# Patient Record
Sex: FEMALE | Race: WHITE | NOT HISPANIC OR LATINO | Employment: OTHER | URBAN - METROPOLITAN AREA
[De-identification: names, ages, dates, MRNs, and addresses within clinical notes are randomized per-mention and may not be internally consistent; named-entity substitution may affect disease eponyms.]

---

## 2017-06-05 ENCOUNTER — TELEPHONE (OUTPATIENT)
Dept: MEDICAL GROUP | Facility: PHYSICIAN GROUP | Age: 77
End: 2017-06-05

## 2017-06-05 DIAGNOSIS — I10 ESSENTIAL HYPERTENSION: ICD-10-CM

## 2017-06-05 RX ORDER — BENAZEPRIL HYDROCHLORIDE 20 MG/1
20 TABLET ORAL 2 TIMES DAILY
Qty: 180 TAB | Refills: 0 | Status: SHIPPED | OUTPATIENT
Start: 2017-06-05 | End: 2017-09-05 | Stop reason: SDUPTHER

## 2017-07-06 DIAGNOSIS — I10 ESSENTIAL HYPERTENSION: ICD-10-CM

## 2017-07-08 RX ORDER — AMLODIPINE BESYLATE 5 MG/1
TABLET ORAL
Qty: 30 TAB | Refills: 0 | Status: SHIPPED | OUTPATIENT
Start: 2017-07-08 | End: 2017-08-24 | Stop reason: SDUPTHER

## 2017-08-24 ENCOUNTER — TELEPHONE (OUTPATIENT)
Dept: HEMATOLOGY ONCOLOGY | Facility: MEDICAL CENTER | Age: 77
End: 2017-08-24

## 2017-08-24 ENCOUNTER — OFFICE VISIT (OUTPATIENT)
Dept: MEDICAL GROUP | Facility: PHYSICIAN GROUP | Age: 77
End: 2017-08-24
Payer: MEDICARE

## 2017-08-24 VITALS
OXYGEN SATURATION: 93 % | RESPIRATION RATE: 16 BRPM | HEIGHT: 64 IN | TEMPERATURE: 97.7 F | SYSTOLIC BLOOD PRESSURE: 120 MMHG | HEART RATE: 77 BPM | BODY MASS INDEX: 21 KG/M2 | DIASTOLIC BLOOD PRESSURE: 64 MMHG | WEIGHT: 123 LBS

## 2017-08-24 DIAGNOSIS — Z72.0 TOBACCO ABUSE: ICD-10-CM

## 2017-08-24 DIAGNOSIS — E78.2 MIXED HYPERLIPIDEMIA: ICD-10-CM

## 2017-08-24 DIAGNOSIS — M40.04 POSTURAL KYPHOSIS OF THORACIC REGION: ICD-10-CM

## 2017-08-24 DIAGNOSIS — H40.9 GLAUCOMA OF BOTH EYES, UNSPECIFIED GLAUCOMA: ICD-10-CM

## 2017-08-24 DIAGNOSIS — F51.04 PSYCHOPHYSIOLOGICAL INSOMNIA: ICD-10-CM

## 2017-08-24 DIAGNOSIS — R26.89 BALANCE DISORDER: ICD-10-CM

## 2017-08-24 DIAGNOSIS — I10 HTN (HYPERTENSION), BENIGN: ICD-10-CM

## 2017-08-24 DIAGNOSIS — M85.89 OSTEOPENIA OF MULTIPLE SITES: ICD-10-CM

## 2017-08-24 PROCEDURE — 99214 OFFICE O/P EST MOD 30 MIN: CPT | Performed by: FAMILY MEDICINE

## 2017-08-24 RX ORDER — LATANOPROST 50 UG/ML
1 SOLUTION/ DROPS OPHTHALMIC DAILY
Qty: 1 BOTTLE | Refills: 4 | Status: SHIPPED | OUTPATIENT
Start: 2017-08-24 | End: 2018-03-05 | Stop reason: SDUPTHER

## 2017-08-24 RX ORDER — AMLODIPINE BESYLATE 5 MG/1
TABLET ORAL
Qty: 90 TAB | Refills: 3 | Status: ON HOLD | OUTPATIENT
Start: 2017-08-24 | End: 2018-05-07

## 2017-08-24 ASSESSMENT — PATIENT HEALTH QUESTIONNAIRE - PHQ9: CLINICAL INTERPRETATION OF PHQ2 SCORE: 0

## 2017-08-24 NOTE — PROGRESS NOTES
Subjective:     Chief Complaint   Patient presents with   • Establish Care   • Hypertension       Hilda Blake is a 77 y.o. female here today today to est care with new provider. Patient is a former patient of Dr. Grossman who is since retired.      Pt reports she feels that she stands leaning forward and feels like she is going to fall forward.  Pt denies Falls, injury, LOC, feeling like the room is spinning, feeling like she is going to pass out, visual changes, or weakness.       Pt reports insomnia. She states she has difficulty falling asleep due to racing thoughts associated with death of son.        Allergies   Allergen Reactions   • Tamoxifen Anaphylaxis     bleeding     Current medicines (including changes today)  Current Outpatient Prescriptions   Medication Sig Dispense Refill   • latanoprost (XALATAN) 0.005 % Solution Place 1 Drop in both eyes every day. 1 Bottle 4   • amlodipine (NORVASC) 5 MG Tab TAKE 1 TABLET BY MOUTH DAILY 90 Tab 3   • metoprolol (LOPRESSOR) 25 MG Tab Take 1 Tab by mouth 2 times a day. 180 Tab 0   • benazepril (LOTENSIN) 20 MG Tab Take 1 Tab by mouth 2 Times a Day. 180 Tab 0   • vitamin D (CHOLECALCIFEROL) 1000 UNIT TABS Take 1,000 Units by mouth every day.     • folic acid (FOLVITE) 1 MG TABS Take 1 mg by mouth every day.         No current facility-administered medications for this visit.      Social History   Substance Use Topics   • Smoking status: Current Every Day Smoker     Packs/day: 0.50     Years: 61.00     Types: Cigarettes     Start date: 1956   • Smokeless tobacco: Never Used      Comment: cessation recommended   • Alcohol use 0.5 oz/week     1 Shots of liquor per week      Comment: 1-2 /week     Family Status   Relation Status Death Age   • Mother     • Father       Family History   Problem Relation Age of Onset   • Diabetes Neg Hx    • Hypertension Neg Hx    • Heart Disease Neg Hx    • Autoimmune Disease Son      Sarcoidosis   • Cancer Son 48      "colon    • No Known Problems Son    • No Known Problems Son    • No Known Problems Daughter      She    has a past medical history of Breast cancer (CMS-HCC) (2004); Glaucoma - Dr. Lima (4/20/2010); Hemangioma of liver benign (8/19/2013); HTN (hypertension), benign; Ptosis of eyelid; and Tobacco abuse.        ROS   GEN: no weight loss, fevers, or chills  HEENT: no blurry vision or change in vision, no ear pain, no difficulty swallowing, no throat pain, no runny nose, no nasal congestion  Resp: no shortness of breath, no cough  CV: no racing heart, no irregular beats, no chest pain  Abd: no nausea, no vomiting, no diarrhea, no constipation, no blood in stool, no dark stools, no incontinence  : no dysuria, no hematuria, no urinary incontinence, no increased frequency  MSK: no muscle aches, no joint pain, no limited motion  Neuro: no headaches, no dizziness, no LOC, no weakness, no numbness/tingling       Objective:     Blood pressure 120/64, pulse 77, temperature 36.5 °C (97.7 °F), resp. rate 16, height 1.626 m (5' 4\"), weight 55.8 kg (123 lb), SpO2 93 %. Body mass index is 21.11 kg/m².   Physical Exam:  Constitutional: Alert, no distress.  Skin: Warm, dry, good turgor, no rashes in visible areas.  Eye: Equal, round and reactive, conjunctiva clear, lids normal.  ENMT: Lips without lesions, oropharynx non-erythematous, no exudate, moist oral mucosa, bilateral tympanic membranes: No bulging, no retraction, no fluid, nonerythematous, positive light reflex, external auditory canals: Clear, scant cerumen, nonerythematous  Neck: Trachea midline, no masses, no thyromegaly. No cervical or supraclavicular lymphadenopathy. Full ROM  Respiratory: Unlabored respiratory effort, good air movement, lungs clear to auscultation, no wheezes, no ronchi.  Cardiovascular:RRR, +S1, S2, no murmur, no peripheral edema, pedal and radial pulses equal and intact bilat  Abdomen: Soft, non-tender, no masses, no hepatosplenomegaly.  MSK:5/5 " muscle strength in upper extremities as well as lower extremity bilaterally, Full lumbar range of motion, ambulates unassisted, standing posture is leaning forward with kyphosis noted in thoracic spine, no widened gait  Psych: Alert and oriented, appropriate affect and mood.  Neuro: CN2-12 intact, no gross motor or sensory deficits negative Romberg, no rigidity,      Assessment and Plan:   The following treatment plan was discussed    1. Postural kyphosis of thoracic region  2. Balance disorder  Recommend home physical therapy. Patient has difficulty getting to appointments. Patient's and her gravity is leaning forward due to kyphosis. Physical therapy will be able to correct this postural change.- CBC WITH DIFFERENTIAL; Future    3. HTN (hypertension), benign  Chronic: Well-controlled  - COMP METABOLIC PANEL; Future  - amlodipine (NORVASC) 5 MG Tab; TAKE 1 TABLET BY MOUTH DAILY  Dispense: 90 Tab; Refill: 3    4. Mixed hyperlipidemia  Chronic: Will recheck lipid panel  - LIPID PROFILE; Future    5. Glaucoma of both eyes, unspecified glaucoma  Continue to follow with ophthalmology  - latanoprost (XALATAN) 0.005 % Solution; Place 1 Drop in both eyes every day.  Dispense: 1 Bottle; Refill: 4    6. Osteopenia of multiple sites  Continue vitamin D supplementation. Advised calcium intake through diet.    7. Tobacco abuse  Pt understands continuing to smoke will increase morbidity and mortality from cancer, stroke, and vascular disease.     - REFERRAL TO LUNG CANCER SCREENING PROGRAM    8. Psychophysiological insomnia  Recommend meditation and melatonin. Patient declines counseling      Followup: Return in about 6 months (around 2/24/2018) for Annual.    Please note that this dictation was created using voice recognition software. I have made every reasonable attempt to correct obvious errors, but I expect that there are errors of grammar and possibly content that I did not discover before finalizing the note.

## 2017-08-24 NOTE — MR AVS SNAPSHOT
"        Hilda Marinoley   2017 10:40 AM   Office Visit   MRN: [id number]    Department:  [facility]   Dept Phone:  [phone]    Description:  Female : 1940   Provider:  Pallavi Mcfarlane D.O.           Reason for Visit     Establish Care     Hypertension           Allergies as of 2017     Allergen Noted Reactions    Tamoxifen 2009   Anaphylaxis    bleeding      You were diagnosed with     Balance disorder   [641400]       HTN (hypertension), benign   [095358]       Mixed hyperlipidemia   [272.2.ICD-9-CM]       Glaucoma of both eyes, unspecified glaucoma   [1503421]       Osteopenia of multiple sites   [3455567]       Tobacco abuse   [595596]       Essential hypertension   [8428758]         Vital Signs     Blood Pressure Pulse Temperature Respirations Height Weight    120/64 mmHg 77 36.5 °C (97.7 °F) 16 1.626 m (5' 4\") 55.792 kg (123 lb)    Body Mass Index Oxygen Saturation Smoking Status             21.10 kg/m2 93% Current Every Day Smoker         Basic Information     Date Of Birth Sex Race Ethnicity Preferred Language    1940 Female White Unknown English      Problem List              ICD-10-CM Priority Class Noted - Resolved    HTN (hypertension), benign I10   Unknown - Present    Ptosis of Eyelid Dr. Lima    Unknown - Present    Tobacco abuse Z72.0   Unknown - Present    Hyperlipidemia E78.5   2010 - Present    Glaucoma - Dr. Lima H40.9   2010 - Present    Osteopenia M85.80   2013 - Present    History of breast cancer  Z85.3   2013 - Present    Hemangioma of liver benign D18.03   2013 - Present    Vitamin D deficiency E55.9   3/9/2015 - Present    Cataracts, both eyes H26.9   2016 - Present      Health Maintenance        Date Due Completion Dates    IMM ZOSTER VACCINE 2000 ---    IMM PNEUMOCOCCAL 65+ (ADULT) LOW/MEDIUM RISK SERIES (2 of 2 - PCV13) 3/9/2016 3/9/2015    IMM INFLUENZA (1) 2017, 2014, 2014, " 10/15/2012, 10/17/2011, 10/19/2010    BONE DENSITY 11/1/2017 11/1/2012, 4/20/2009, 4/20/2009    IMM DTaP/Tdap/Td Vaccine (2 - Td) 3/9/2025 3/9/2015            Current Immunizations     Influenza TIV (IM) 1/7/2014, 10/15/2012, 10/17/2011    Influenza Vaccine Adult HD 11/5/2015  2:00 PM    Influenza Vaccine Pediatric 10/19/2010    Influenza Vaccine Quad Inj (Pf) 11/12/2014    Pneumococcal polysaccharide vaccine (PPSV-23) 3/9/2015    Tdap Vaccine 3/9/2015      Below and/or attached are the medications your provider expects you to take. Review all of your home medications and newly ordered medications with your provider and/or pharmacist. Follow medication instructions as directed by your provider and/or pharmacist. Please keep your medication list with you and share with your provider. Update the information when medications are discontinued, doses are changed, or new medications (including over-the-counter products) are added; and carry medication information at all times in the event of emergency situations     Allergies:  TAMOXIFEN - Anaphylaxis               Medications  Valid as of: August 24, 2017 - 11:58 AM    Generic Name Brand Name Tablet Size Instructions for use    AmLODIPine Besylate (Tab) NORVASC 5 MG TAKE 1 TABLET BY MOUTH DAILY        Benazepril HCl (Tab) LOTENSIN 20 MG Take 1 Tab by mouth 2 Times a Day.        Cholecalciferol (Tab) cholecalciferol 1000 UNIT Take 1,000 Units by mouth every day.        Folic Acid (Tab) FOLVITE 1 MG Take 1 mg by mouth every day.          Latanoprost (Solution) XALATAN 0.005 % Place 1 Drop in both eyes every day.        Metoprolol Tartrate (Tab) LOPRESSOR 25 MG Take 1 Tab by mouth 2 times a day.        .                 Medicines prescribed today were sent to:     [id number] [place] - [address]    [address] [place] [address]    Phone: [phone] Fax: [phone]    Open 24 Hours?: No      Medication refill instructions:       If your  prescription bottle indicates you have medication refills left, it is not necessary to call your provider’s office. Please contact your pharmacy and they will refill your medication.    If your prescription bottle indicates you do not have any refills left, you may request refills at any time through one of the following ways: The online Re[employer] system (except Urgent Care), by calling your provider’s office, or by asking your pharmacy to contact your provider’s office with a refill request. Medication refills are processed only during regular business hours and may not be available until the next business day. Your provider may request additional information or to have a follow-up visit with you prior to refilling your medication.   *Please Note: Medication refills are assigned a new Rx number when refilled electronically. Your pharmacy may indicate that no refills were authorized even though a new prescription for the same medication is available at the pharmacy. Please request the medicine by name with the pharmacy before contacting your provider for a refill.        Your To Do List     Future Labs/Procedures Complete By Expires    CBC WITH DIFFERENTIAL  As directed 8/24/2018    COMP METABOLIC PANEL  As directed 8/24/2018    LIPID PROFILE  As directed 8/24/2018      Other Notes About Your Plan     No problems identified through [facility]             MagicEventhart Status: Patient Declined        Quit Tobacco Information     Do you want to quit using tobacco?    Quitting tobacco decreases risks of cancer, heart and lung disease, increases life expectancy, improves sense of taste and smell, and increases spending money, among other benefits.    If you are thinking about quitting, we can help.  • [facility] Quit Tobacco Program: 878-439-9020  o Program occurs weekly for four weeks and includes pharmacist consultation on products to support quitting smoking or chewing tobacco. A provider referral is needed for pharmacist  consultation.  • Tobacco Users Help Hotline: 6-800-QUIT-NOW (653-3924) or https://[place].quitlogix.org/  o Free, confidential telephone and online coaching for [place] residents. Sessions are designed on a schedule that is convenient for you. Eligible clients receive free nicotine replacement therapy.  • Nationally: www.smokefree.gov  o Information and professional assistance to support both immediate and long-term needs as you become, and remain, a non-smoker. Smokefree.gov allows you to choose the help that best fits your needs.

## 2017-08-24 NOTE — TELEPHONE ENCOUNTER
Received referral to lung cancer screening program.  Chart review to assess for lung cancer screening program eligibility.   1. Age 55-77 yrs of age? Yes 77 y.o.  2. 30 pack year hx of smoking, or greater? Yes 1 ppdx 60 yrs= 60 pkyr hx   3. Current smoker or if quit, has pt quit within last 15 yrs?Yes   Has cut back to 1/2 pack a day  4. Any signs or symptoms of lung cancer? None note     5. Previous history of lung cancer? None noted  6. Chest CT within past 12 mos.? None noted  Patient does meet eligibility criteria. [facility] scheduling notified to schedule the shared decision making visit.

## 2017-08-28 ENCOUNTER — HOME HEALTH ADMISSION (OUTPATIENT)
Dept: HOME HEALTH SERVICES | Facility: HOME HEALTHCARE | Age: 77
End: 2017-08-28
Payer: MEDICARE

## 2017-08-29 ENCOUNTER — HOME CARE VISIT (OUTPATIENT)
Dept: HOME HEALTH SERVICES | Facility: HOME HEALTHCARE | Age: 77
End: 2017-08-29

## 2017-08-31 ENCOUNTER — HOME CARE VISIT (OUTPATIENT)
Dept: HOME HEALTH SERVICES | Facility: HOME HEALTHCARE | Age: 77
End: 2017-08-31
Payer: MEDICARE

## 2017-08-31 VITALS
DIASTOLIC BLOOD PRESSURE: 60 MMHG | HEIGHT: 64 IN | OXYGEN SATURATION: 98 % | HEART RATE: 61 BPM | SYSTOLIC BLOOD PRESSURE: 135 MMHG | TEMPERATURE: 97.9 F | RESPIRATION RATE: 18 BRPM | BODY MASS INDEX: 21 KG/M2 | WEIGHT: 123 LBS

## 2017-08-31 PROCEDURE — 665001 SOC-HOME HEALTH

## 2017-08-31 PROCEDURE — G0162 HHC RN E&M PLAN SVS, 15 MIN: HCPCS

## 2017-08-31 SDOH — ECONOMIC STABILITY: HOUSING INSECURITY: UNSAFE COOKING RANGE AREA: 0

## 2017-08-31 SDOH — ECONOMIC STABILITY: HOUSING INSECURITY: UNSAFE APPLIANCES: 0

## 2017-08-31 ASSESSMENT — ACTIVITIES OF DAILY LIVING (ADL)
HOME_HEALTH_OASIS: 01
TRANSPORTATION COMMENTS: FATIGUES EASILY
OASIS_M1830: 02
HOME_HEALTH_OASIS: 02

## 2017-08-31 ASSESSMENT — PATIENT HEALTH QUESTIONNAIRE - PHQ9
1. LITTLE INTEREST OR PLEASURE IN DOING THINGS: 01
2. FEELING DOWN, DEPRESSED, IRRITABLE, OR HOPELESS: 01

## 2017-08-31 ASSESSMENT — ENCOUNTER SYMPTOMS
NAUSEA: DENIES
VOMITING: DENIES
ADDITIONAL INFORMATION: 0-5

## 2017-09-01 ENCOUNTER — HOME CARE VISIT (OUTPATIENT)
Dept: HOME HEALTH SERVICES | Facility: HOME HEALTHCARE | Age: 77
End: 2017-09-01
Payer: MEDICARE

## 2017-09-04 ENCOUNTER — HOME CARE VISIT (OUTPATIENT)
Dept: HOME HEALTH SERVICES | Facility: HOME HEALTHCARE | Age: 77
End: 2017-09-04
Payer: MEDICARE

## 2017-09-04 VITALS
HEART RATE: 64 BPM | OXYGEN SATURATION: 92 % | RESPIRATION RATE: 16 BRPM | SYSTOLIC BLOOD PRESSURE: 118 MMHG | DIASTOLIC BLOOD PRESSURE: 50 MMHG | TEMPERATURE: 98.1 F

## 2017-09-04 PROCEDURE — G0151 HHCP-SERV OF PT,EA 15 MIN: HCPCS

## 2017-09-05 DIAGNOSIS — I10 ESSENTIAL HYPERTENSION: ICD-10-CM

## 2017-09-05 RX ORDER — BENAZEPRIL HYDROCHLORIDE 20 MG/1
20 TABLET ORAL 2 TIMES DAILY
Qty: 180 TAB | Refills: 1 | Status: SHIPPED | OUTPATIENT
Start: 2017-09-05 | End: 2018-03-05 | Stop reason: SDUPTHER

## 2017-09-05 ASSESSMENT — ACTIVITIES OF DAILY LIVING (ADL)
DRESSING_UB_ASSISTANCE: 0
EATING_ASSISTANCE: 0
ORAL_CARE_ASSISTANCE: 0
TOILETING_ASSISTANCE: 0
BATHING_ASSISTANCE: 0
DRESSING_LB_ASSISTANCE: 0
GROOMING_ASSISTANCE: 0

## 2017-09-05 NOTE — TELEPHONE ENCOUNTER
Refill X 6 months, sent to pharmacy.Pt. Seen in the last 6 months per protocol.   Lab Results   Component Value Date/Time    SODIUM 140 05/16/2016 09:40 AM    POTASSIUM 3.9 05/16/2016 09:40 AM    CHLORIDE 106 05/16/2016 09:40 AM    CO2 27 05/16/2016 09:40 AM    GLUCOSE 102 (H) 05/16/2016 09:40 AM    BUN 20 05/16/2016 09:40 AM    CREATININE 0.84 05/16/2016 09:40 AM    CREATININE 0.97 04/14/2011 09:08 AM    BUNCREATRAT 18 04/14/2011 09:08 AM    GLOMRATE >59 11/19/2009 07:41 AM       Judson Barboza, BryanD

## 2017-09-05 NOTE — TELEPHONE ENCOUNTER
Was the patient seen in the last year in this department? Yes     Does patient have an active prescription for medications requested? No     Received Request Via: Pharmacy      Pt met protocol?: Yes, OV last month   BP Readings from Last 1 Encounters:   09/04/17 118/50

## 2017-09-06 ENCOUNTER — HOME CARE VISIT (OUTPATIENT)
Dept: HOME HEALTH SERVICES | Facility: HOME HEALTHCARE | Age: 77
End: 2017-09-06
Payer: MEDICARE

## 2017-09-07 ENCOUNTER — HOME CARE VISIT (OUTPATIENT)
Dept: HOME HEALTH SERVICES | Facility: HOME HEALTHCARE | Age: 77
End: 2017-09-07
Payer: MEDICARE

## 2017-09-07 VITALS
HEART RATE: 70 BPM | TEMPERATURE: 98.3 F | RESPIRATION RATE: 18 BRPM | SYSTOLIC BLOOD PRESSURE: 106 MMHG | OXYGEN SATURATION: 95 % | DIASTOLIC BLOOD PRESSURE: 60 MMHG

## 2017-09-07 PROCEDURE — G0151 HHCP-SERV OF PT,EA 15 MIN: HCPCS

## 2017-09-07 ASSESSMENT — ENCOUNTER SYMPTOMS: OCCASIONAL FEELINGS OF UNSTEADINESS: 1

## 2017-09-08 ENCOUNTER — HOME CARE VISIT (OUTPATIENT)
Dept: HOME HEALTH SERVICES | Facility: HOME HEALTHCARE | Age: 77
End: 2017-09-08
Payer: MEDICARE

## 2017-09-12 ENCOUNTER — HOME CARE VISIT (OUTPATIENT)
Dept: HOME HEALTH SERVICES | Facility: HOME HEALTHCARE | Age: 77
End: 2017-09-12
Payer: MEDICARE

## 2017-09-12 VITALS
OXYGEN SATURATION: 94 % | TEMPERATURE: 97.7 F | RESPIRATION RATE: 18 BRPM | DIASTOLIC BLOOD PRESSURE: 58 MMHG | SYSTOLIC BLOOD PRESSURE: 100 MMHG

## 2017-09-12 PROCEDURE — G0151 HHCP-SERV OF PT,EA 15 MIN: HCPCS

## 2017-09-14 ENCOUNTER — HOME CARE VISIT (OUTPATIENT)
Dept: HOME HEALTH SERVICES | Facility: HOME HEALTHCARE | Age: 77
End: 2017-09-14
Payer: MEDICARE

## 2017-09-14 VITALS
DIASTOLIC BLOOD PRESSURE: 66 MMHG | HEART RATE: 74 BPM | SYSTOLIC BLOOD PRESSURE: 116 MMHG | TEMPERATURE: 98.4 F | RESPIRATION RATE: 16 BRPM | OXYGEN SATURATION: 95 %

## 2017-09-14 PROCEDURE — G0151 HHCP-SERV OF PT,EA 15 MIN: HCPCS

## 2017-09-18 ENCOUNTER — HOME CARE VISIT (OUTPATIENT)
Dept: HOME HEALTH SERVICES | Facility: HOME HEALTHCARE | Age: 77
End: 2017-09-18
Payer: MEDICARE

## 2017-09-18 VITALS
SYSTOLIC BLOOD PRESSURE: 104 MMHG | OXYGEN SATURATION: 92 % | DIASTOLIC BLOOD PRESSURE: 54 MMHG | RESPIRATION RATE: 16 BRPM | TEMPERATURE: 97.9 F | HEART RATE: 80 BPM

## 2017-09-18 PROCEDURE — G0180 MD CERTIFICATION HHA PATIENT: HCPCS | Performed by: FAMILY MEDICINE

## 2017-09-18 PROCEDURE — G0151 HHCP-SERV OF PT,EA 15 MIN: HCPCS

## 2017-09-21 ENCOUNTER — HOME CARE VISIT (OUTPATIENT)
Dept: HOME HEALTH SERVICES | Facility: HOME HEALTHCARE | Age: 77
End: 2017-09-21
Payer: MEDICARE

## 2017-09-21 VITALS
HEART RATE: 74 BPM | RESPIRATION RATE: 16 BRPM | TEMPERATURE: 97.7 F | DIASTOLIC BLOOD PRESSURE: 64 MMHG | OXYGEN SATURATION: 94 % | SYSTOLIC BLOOD PRESSURE: 118 MMHG

## 2017-09-21 PROCEDURE — G0151 HHCP-SERV OF PT,EA 15 MIN: HCPCS

## 2017-09-21 ASSESSMENT — ENCOUNTER SYMPTOMS: OCCASIONAL FEELINGS OF UNSTEADINESS: 0

## 2017-09-21 ASSESSMENT — ACTIVITIES OF DAILY LIVING (ADL)
OASIS_M1830: 00
HOME_HEALTH_OASIS: 00
HOME_HEALTH_OASIS: 01

## 2017-10-10 ENCOUNTER — HOSPITAL ENCOUNTER (OUTPATIENT)
Dept: LAB | Facility: MEDICAL CENTER | Age: 77
End: 2017-10-10
Attending: FAMILY MEDICINE
Payer: MEDICARE

## 2017-10-10 DIAGNOSIS — E78.2 MIXED HYPERLIPIDEMIA: ICD-10-CM

## 2017-10-10 DIAGNOSIS — R26.89 BALANCE DISORDER: ICD-10-CM

## 2017-10-10 DIAGNOSIS — I10 HTN (HYPERTENSION), BENIGN: ICD-10-CM

## 2017-10-10 LAB
ALBUMIN SERPL BCP-MCNC: 4.3 G/DL (ref 3.2–4.9)
ALBUMIN/GLOB SERPL: 1.3 G/DL
ALP SERPL-CCNC: 88 U/L (ref 30–99)
ALT SERPL-CCNC: 11 U/L (ref 2–50)
ANION GAP SERPL CALC-SCNC: 6 MMOL/L (ref 0–11.9)
AST SERPL-CCNC: 21 U/L (ref 12–45)
BASOPHILS # BLD AUTO: 0.8 % (ref 0–1.8)
BASOPHILS # BLD: 0.06 K/UL (ref 0–0.12)
BILIRUB SERPL-MCNC: 1 MG/DL (ref 0.1–1.5)
BUN SERPL-MCNC: 21 MG/DL (ref 8–22)
CALCIUM SERPL-MCNC: 10.2 MG/DL (ref 8.5–10.5)
CHLORIDE SERPL-SCNC: 104 MMOL/L (ref 96–112)
CHOLEST SERPL-MCNC: 209 MG/DL (ref 100–199)
CO2 SERPL-SCNC: 29 MMOL/L (ref 20–33)
CREAT SERPL-MCNC: 0.86 MG/DL (ref 0.5–1.4)
EOSINOPHIL # BLD AUTO: 0.12 K/UL (ref 0–0.51)
EOSINOPHIL NFR BLD: 1.6 % (ref 0–6.9)
ERYTHROCYTE [DISTWIDTH] IN BLOOD BY AUTOMATED COUNT: 48.4 FL (ref 35.9–50)
GFR SERPL CREATININE-BSD FRML MDRD: >60 ML/MIN/1.73 M 2
GLOBULIN SER CALC-MCNC: 3.2 G/DL (ref 1.9–3.5)
GLUCOSE SERPL-MCNC: 92 MG/DL (ref 65–99)
HCT VFR BLD AUTO: 51 % (ref 37–47)
HDLC SERPL-MCNC: 79 MG/DL
HGB BLD-MCNC: 16.9 G/DL (ref 12–16)
IMM GRANULOCYTES # BLD AUTO: 0.03 K/UL (ref 0–0.11)
IMM GRANULOCYTES NFR BLD AUTO: 0.4 % (ref 0–0.9)
LDLC SERPL CALC-MCNC: 108 MG/DL
LYMPHOCYTES # BLD AUTO: 1.8 K/UL (ref 1–4.8)
LYMPHOCYTES NFR BLD: 23.5 % (ref 22–41)
MCH RBC QN AUTO: 31.1 PG (ref 27–33)
MCHC RBC AUTO-ENTMCNC: 33.1 G/DL (ref 33.6–35)
MCV RBC AUTO: 93.9 FL (ref 81.4–97.8)
MONOCYTES # BLD AUTO: 0.72 K/UL (ref 0–0.85)
MONOCYTES NFR BLD AUTO: 9.4 % (ref 0–13.4)
NEUTROPHILS # BLD AUTO: 4.94 K/UL (ref 2–7.15)
NEUTROPHILS NFR BLD: 64.3 % (ref 44–72)
NRBC # BLD AUTO: 0 K/UL
NRBC BLD AUTO-RTO: 0 /100 WBC
PLATELET # BLD AUTO: 205 K/UL (ref 164–446)
PMV BLD AUTO: 12.4 FL (ref 9–12.9)
POTASSIUM SERPL-SCNC: 4.2 MMOL/L (ref 3.6–5.5)
PROT SERPL-MCNC: 7.5 G/DL (ref 6–8.2)
RBC # BLD AUTO: 5.43 M/UL (ref 4.2–5.4)
SODIUM SERPL-SCNC: 139 MMOL/L (ref 135–145)
TRIGL SERPL-MCNC: 112 MG/DL (ref 0–149)
WBC # BLD AUTO: 7.7 K/UL (ref 4.8–10.8)

## 2017-10-10 PROCEDURE — 36415 COLL VENOUS BLD VENIPUNCTURE: CPT

## 2017-10-10 PROCEDURE — 80061 LIPID PANEL: CPT

## 2017-10-10 PROCEDURE — 85025 COMPLETE CBC W/AUTO DIFF WBC: CPT

## 2017-10-10 PROCEDURE — 80053 COMPREHEN METABOLIC PANEL: CPT

## 2017-10-30 ENCOUNTER — NON-PROVIDER VISIT (OUTPATIENT)
Dept: URGENT CARE | Facility: PHYSICIAN GROUP | Age: 77
End: 2017-10-30
Payer: MEDICARE

## 2017-10-30 DIAGNOSIS — Z23 NEEDS FLU SHOT: ICD-10-CM

## 2017-10-30 PROCEDURE — 90662 IIV NO PRSV INCREASED AG IM: CPT | Performed by: PHYSICIAN ASSISTANT

## 2017-10-30 PROCEDURE — G0008 ADMIN INFLUENZA VIRUS VAC: HCPCS | Performed by: PHYSICIAN ASSISTANT

## 2018-02-02 ENCOUNTER — OFFICE VISIT (OUTPATIENT)
Dept: MEDICAL GROUP | Facility: PHYSICIAN GROUP | Age: 78
End: 2018-02-02
Payer: MEDICARE

## 2018-02-02 VITALS
WEIGHT: 129 LBS | HEIGHT: 64 IN | SYSTOLIC BLOOD PRESSURE: 122 MMHG | DIASTOLIC BLOOD PRESSURE: 70 MMHG | OXYGEN SATURATION: 94 % | TEMPERATURE: 98.7 F | HEART RATE: 64 BPM | BODY MASS INDEX: 22.02 KG/M2

## 2018-02-02 DIAGNOSIS — M85.89 OSTEOPENIA OF MULTIPLE SITES: ICD-10-CM

## 2018-02-02 DIAGNOSIS — Z72.0 TOBACCO ABUSE: ICD-10-CM

## 2018-02-02 DIAGNOSIS — I10 HTN (HYPERTENSION), BENIGN: ICD-10-CM

## 2018-02-02 DIAGNOSIS — E78.00 PURE HYPERCHOLESTEROLEMIA: ICD-10-CM

## 2018-02-02 DIAGNOSIS — H02.401 PTOSIS OF EYELID, RIGHT: ICD-10-CM

## 2018-02-02 DIAGNOSIS — H26.9 CATARACT OF BOTH EYES, UNSPECIFIED CATARACT TYPE: ICD-10-CM

## 2018-02-02 DIAGNOSIS — Z78.0 ASYMPTOMATIC MENOPAUSAL STATE: ICD-10-CM

## 2018-02-02 DIAGNOSIS — H40.9 GLAUCOMA OF BOTH EYES, UNSPECIFIED GLAUCOMA TYPE: ICD-10-CM

## 2018-02-02 PROCEDURE — 99214 OFFICE O/P EST MOD 30 MIN: CPT | Performed by: PHYSICIAN ASSISTANT

## 2018-02-02 NOTE — ASSESSMENT & PLAN NOTE
Endorses stability on current regimen of metoprolol 25 mg BID, benazepril 20 mg BID, and amlodipine 5 mg daily. Denies any concerns.

## 2018-02-02 NOTE — ASSESSMENT & PLAN NOTE
Currently smoking 1/2 ppd. Has been smoking for about 60 years by her estimation. No current interest in quitting.

## 2018-02-02 NOTE — ASSESSMENT & PLAN NOTE
Not currently on any medication. Tries to manage with diet. No current exercise. Last lipid panel in 10/2017.

## 2018-02-02 NOTE — ASSESSMENT & PLAN NOTE
States surgery was recommended. Has not seen ophthalmologist in >1 year. Plans to schedule appointment.

## 2018-02-02 NOTE — PROGRESS NOTES
Subjective:   Hilda Blake is a 77 y.o. female here today for follow-up on hypertension, hyperlipidemia, and other chronic conditions. Is a new patient to me and is also establishing care today.    Previous PCP: Pallavi Mcfarlane DO    HPI: Patient has the following current medical problems:    Cataracts, both eyes  States surgery was recommended. Has not seen ophthalmologist in >1 year. Plans to schedule appointment.    Glaucoma - Dr. Lima  Endorses glaucoma in both eyes. Diagnosed 4-5 years ago per patient. Currently using latanoprost drops daily. Has not seen ophthalmologist in >1 year. Plans to schedule appointment.    Ptosis of eyelid, right  Endorses congenital ptosis of right eyelid. States has gotten more pronounced in recent years. Follows with ophthalmology. Has not seen in >1 year. Plans to schedule appointment.    HTN (Hypertension), Benign  Endorses stability on current regimen of metoprolol 25 mg BID, benazepril 20 mg BID, and amlodipine 5 mg daily. Denies any concerns.    Hyperlipidemia  Not currently on any medication. Tries to manage with diet. No current exercise. Last lipid panel in 10/2017.    Tobacco Abuse  Currently smoking 1/2 ppd. Has been smoking for about 60 years by her estimation. No current interest in quitting.    Osteopenia  Last DEXA in 11/2017 consistent with osteopenia. Taking daily vitamin D supplement. Not on calcium supplement due to constipation issues, but endorses drinking a lot of milk.       Current medicines (including changes today)  Current Outpatient Prescriptions   Medication Sig Dispense Refill   • metoprolol (LOPRESSOR) 25 MG Tab Take 1 Tab by mouth 2 times a day. 180 Tab 1   • benazepril (LOTENSIN) 20 MG Tab Take 1 Tab by mouth 2 Times a Day. 180 Tab 1   • folic acid (FOLVITE) 400 MCG tablet Take 400 mcg by mouth every day. supplement     • aspirin 81 MG EC tablet Take 81 mg by mouth every day. blood thinner     • ibuprofen (MOTRIN) 200 MG Tab Take 200 mg by mouth  "every 6 hours as needed for Mild Pain.     • latanoprost (XALATAN) 0.005 % Solution Place 1 Drop in both eyes every day. 1 Bottle 4   • amlodipine (NORVASC) 5 MG Tab TAKE 1 TABLET BY MOUTH DAILY 90 Tab 3   • vitamin D (CHOLECALCIFEROL) 1000 UNIT TABS Take 1,000 Units by mouth every day. supplement       No current facility-administered medications for this visit.      She  has a past medical history of Breast cancer (CMS-HCC) (2004); Glaucoma - Dr. Lima (4/20/2010); Hemangioma of liver; Hemangioma of liver benign (8/19/2013); HTN (hypertension), benign; Hyperlipidemia; Hypertension; Osteopenia; Ptosis of eyelid; and Tobacco abuse.      ROS  Pulmonary ROS: No shortness of breath  Cardiovascular ROS: No chest pain  Neurologic ROS: No dizziness       Objective:     Blood pressure 122/70, pulse 64, temperature 37.1 °C (98.7 °F), height 1.626 m (5' 4\"), weight 58.5 kg (129 lb), SpO2 94 %. Body mass index is 22.14 kg/m².   Physical Exam:  Constitutional: Alert, pleasant, no distress. Smells of tobacco smoke.  Skin: No rashes in visible areas.  Eye: Conjunctiva clear, lids normal.  ENMT: Lips without lesions, moist mucus membranes.  Respiratory: Unlabored respiratory effort, lungs clear to auscultation, no wheezes, no rhonchi.  Cardiovascular: Normal S1, S2, no murmur, no lower extremity edema.      Assessment and Plan:   The following treatment plan was discussed    1. HTN (hypertension), benign  Chronic issue, well-controlled on current medication regimen. Continue metoprolol, benazepril, and amlodipine.    2. Pure hypercholesterolemia  Chronic issue, reviewed last lipid panel from 10/2017 which showed mild elevation of total cholesterol/LDL. Discussed importance of quitting smoking, lifestyle modification including healthy diet and exercise.    Cholesterol,Tot 209   100 - 199 mg/dL Final   Triglycerides 112  0 - 149 mg/dL Final   HDL 79  >=40 mg/dL Final      <100 mg/dL Final     3. Cataract of both eyes, " unspecified cataract type  Chronic issue, likely needs surgery. Will follow up with ophthalmologist.    4. Glaucoma of both eyes, unspecified glaucoma type  Chronic issue, stable, but hasn't been seen in >1 year. Patient advised to schedule follow-up appointment. In the meantime, continue daily latanoprost.    5. Ptosis of eyelid, right  Chronic issue since birth. Follows with ophthalmology.    6. Tobacco abuse  Heavy smoking history. Cessation encouraged but patient has no current interest.    7. Osteopenia of multiple sites  8. Asymptomatic menopausal state  Reviewed last DEXA from 11/2012 which is c/w osteopenia. Due for another study which was ordered. Discussed importance of continuing daily vitamin D supplement. If she is not going to do calcium supplement, needs to ensure she is getting adequate amount through her diet. Weight-bearing exercise encouraged.  - DS-BONE DENSITY STUDY (DEXA); Future      Followup: Return for discuss other concerns; Short.    Barbara Pyle P.A.-C.

## 2018-02-02 NOTE — ASSESSMENT & PLAN NOTE
Endorses glaucoma in both eyes. Diagnosed 4-5 years ago per patient. Currently using latanoprost drops daily. Has not seen ophthalmologist in >1 year. Plans to schedule appointment.

## 2018-02-02 NOTE — ASSESSMENT & PLAN NOTE
Last DEXA in 11/2017 consistent with osteopenia. Taking daily vitamin D supplement. Not on calcium supplement due to constipation issues, but endorses drinking a lot of milk.

## 2018-02-02 NOTE — ASSESSMENT & PLAN NOTE
Endorses congenital ptosis of right eyelid. States has gotten more pronounced in recent years. Follows with ophthalmology. Has not seen in >1 year. Plans to schedule appointment.

## 2018-03-05 DIAGNOSIS — I10 ESSENTIAL HYPERTENSION: ICD-10-CM

## 2018-03-07 RX ORDER — BENAZEPRIL HYDROCHLORIDE 20 MG/1
20 TABLET ORAL 2 TIMES DAILY
Qty: 180 TAB | Refills: 1 | Status: ON HOLD | OUTPATIENT
Start: 2018-03-07 | End: 2018-05-07

## 2018-03-07 RX ORDER — LATANOPROST 50 UG/ML
1 SOLUTION/ DROPS OPHTHALMIC DAILY
Qty: 1 BOTTLE | Refills: 4 | Status: SHIPPED | OUTPATIENT
Start: 2018-03-07 | End: 2019-01-11

## 2018-03-28 ENCOUNTER — TELEPHONE (OUTPATIENT)
Dept: MEDICAL GROUP | Facility: PHYSICIAN GROUP | Age: 78
End: 2018-03-28

## 2018-03-28 NOTE — TELEPHONE ENCOUNTER
Future Appointments       Provider Department Center    3/29/2018 1:45 PM Barbara Pyle P.A.-C. [facility]        ESTABLISHED PATIENT PRE-VISIT PLANNING     Note: Patient will not be contacted if there is no indication to call.     1.  Reviewed notes from the last few office visits within the medical group: Yes 02/02/2018    2.  If any orders were placed at last visit or intended to be done for this visit (i.e. 6 mos follow-up), do we have Results/Consult Notes?        •  Labs - Labs were not ordered at last office visit.       •  Imaging - Imaging ordered, NOT completed. Patient advised to complete prior to next appointment.       •  Referrals - No referrals were ordered at last office visit.    3. Is this appointment scheduled as a Hospital Follow-Up? No    4.  Immunizations were updated in [facility] using WebIZ?: Yes       •  Web Iz Recommendations: MMR , PREVNAR (PCV13) , TD and ZOSTAVAX (Shingles)    5.  Patient is due for the following Health Maintenance Topics:   Health Maintenance Due   Topic Date Due   • IMM ZOSTER VACCINE  07/20/2000   • IMM PNEUMOCOCCAL (2 of 2 - PCV13) 03/09/2016   • Annual Wellness Visit  07/20/2016   • BONE DENSITY  11/01/2017       6.  MDX printed for Provider? YES     7.  Patient was informed to arrive 15 min prior to their scheduled appointment and bring in their medication bottles.

## 2018-03-29 ENCOUNTER — OFFICE VISIT (OUTPATIENT)
Dept: MEDICAL GROUP | Facility: PHYSICIAN GROUP | Age: 78
End: 2018-03-29
Payer: MEDICARE

## 2018-03-29 VITALS
TEMPERATURE: 98.1 F | HEIGHT: 64 IN | OXYGEN SATURATION: 94 % | BODY MASS INDEX: 21 KG/M2 | DIASTOLIC BLOOD PRESSURE: 64 MMHG | SYSTOLIC BLOOD PRESSURE: 110 MMHG | WEIGHT: 123 LBS | HEART RATE: 62 BPM

## 2018-03-29 DIAGNOSIS — Z01.818 PREOP GENERAL PHYSICAL EXAM: ICD-10-CM

## 2018-03-29 DIAGNOSIS — H26.9 CATARACT OF BOTH EYES, UNSPECIFIED CATARACT TYPE: ICD-10-CM

## 2018-03-29 DIAGNOSIS — E78.00 PURE HYPERCHOLESTEROLEMIA: ICD-10-CM

## 2018-03-29 DIAGNOSIS — I10 HTN (HYPERTENSION), BENIGN: ICD-10-CM

## 2018-03-29 DIAGNOSIS — H40.9 GLAUCOMA OF BOTH EYES, UNSPECIFIED GLAUCOMA TYPE: ICD-10-CM

## 2018-03-29 PROCEDURE — 99214 OFFICE O/P EST MOD 30 MIN: CPT | Performed by: PHYSICIAN ASSISTANT

## 2018-03-29 NOTE — PROGRESS NOTES
REASON FOR VISIT: Pre-Op Consultation  Consultation Requested by: Dr. Hilario Reagan - [facility]  Procedure date and type: 4/4/18 - right cataract surgery with shunt placement   Anesthesia: Light sedation per patient    History of condition for which surgery is planned: Chronic bilateral glaucoma with elevated IOP per patient. Shunt recommended. Also has bilateral cataracts. Will be having right side done with plans to have left side done in the future.    Current chronic conditions: has HTN (hypertension), benign; Ptosis of eyelid, right; Tobacco abuse; Hyperlipidemia; Glaucoma - Dr. Lima; Osteopenia; and Cataracts, both eyes on her problem list.     Past medical history:  has a past medical history of Breast cancer (CMS-HCC) (2004); Glaucoma - Dr. Lima (4/20/2010); Hemangioma of liver; Hemangioma of liver benign (8/19/2013); HTN (hypertension), benign; Hyperlipidemia; Hypertension; Osteopenia; Ptosis of eyelid; and Tobacco abuse.. Negative for: CAD, SBE, CVA, TIA, DVT, PE, bleeding requiring transfusion, intubation.    Surgical and anesthetic history:  has a past surgical history that includes mastectomy bilateral subq; cholecystectomy; and mastectomy bilateral subq. Prior surgery without complication, bleeding, reaction to anesthetic, prolonged recovery    Habits:   Social History   Substance Use Topics   • Smoking status: Current Every Day Smoker     Packs/day: 0.50     Years: 61.00     Types: Cigarettes     Start date: 4/27/1956   • Smokeless tobacco: Never Used      Comment: cessation recommended   • Alcohol use 0.5 oz/week     1 Shots of liquor per week      Comment: 1-2 /week     Allergies: Tamoxifen No known allergy to Anesthetic, or Latex.       Current medicines:   Current Outpatient Prescriptions   Medication Sig Dispense Refill   • benazepril (LOTENSIN) 20 MG Tab Take 1 Tab by mouth 2 Times a Day. 180 Tab 1   • latanoprost (XALATAN) 0.005 % Solution Place 1 Drop in  "both eyes every day. 1 Bottle 4   • metoprolol (LOPRESSOR) 25 MG Tab Take 1 Tab by mouth 2 times a day. 180 Tab 1   • folic acid (FOLVITE) 400 MCG tablet Take 400 mcg by mouth every day. supplement     • aspirin 81 MG EC tablet Take 81 mg by mouth every day. blood thinner     • ibuprofen (MOTRIN) 200 MG Tab Take 200 mg by mouth every 6 hours as needed for Mild Pain.     • amlodipine (NORVASC) 5 MG Tab TAKE 1 TABLET BY MOUTH DAILY 90 Tab 3   • vitamin D (CHOLECALCIFEROL) 1000 UNIT TABS Take 1,000 Units by mouth every day. supplement       No current facility-administered medications for this visit.      Anticoagulant: ASA - stopped 2 weeks ago. Denies NSAID use.  Herbals: None       ROS: negative for: CP, SOB, SCHROEDER, Orthopnea, leg edema, dysuria, fevers, chills, sweats, cold, congestion, abd pain, reflux, black or bloody stools, weight loss/gain.  Functional Status: ambulatory. Does not use assistive device to ambulate.    PHYSICAL EXAMINATION:  VITAL SIGNS: Blood pressure 110/64, pulse 62, temperature 36.7 °C (98.1 °F), height 1.626 m (5' 4\"), weight 55.8 kg (123 lb), SpO2 94 %. Body mass index is 21.11 kg/m².  HEENT: EOMI, PERRL. Oropharynx pink, moist. Mallampati: I (soft palate, uvula, fauces, tonsillar pillars visible). Neck supple, no cervical lymphadenopathy.   LUNGS: CTAB good excursion.   HEART: RRR no murmur.  ABDOMEN: soft, nondistended, nontender normal BS.   LOWER EXTREMITIES: warm and well perfused with no edema.      Labs: None recommended    IMPRESSION:  1. Diagnoses of Preop general physical exam, Glaucoma of both eyes, unspecified glaucoma type, Cataract of both eyes, unspecified cataract type, HTN (hypertension), benign, and Pure hypercholesterolemia were pertinent to this visit.  2. Planned surgery: Right cataract surgery with shunt placement  3. High risk medical conditions: negative for Cardiac, Pulmonary, Bleeding, Poor healing, Thrombosis, Debility    PLAN:  1. Chronic medical conditions: " Stable and controlled. Continue current medicines.   2. Avoid drugs that potentiate bleeding as advised by surgeon  3. Discontinue all herbal supplements 2 weeks prior to surgery.  4. Need for SBE prophylaxis: No  5. This patient is considered Low risk for cardiopulmonary complications for this planned surgery.    Sherman Index for assessing perioperative cardiovascular risk [Circulation 1999;100:1047]:   (one point each for * high-risk surgery [ intrathoracic, suprainguinal vascular, intraperitoneal ],* Hx ischemic heart dz, * Hx CHF, *Hx TIA or CVA, *IDDM, *Serum Cr >2.0)   Interpretation of risk: (Complication = MI, Pulm edema, v-fib or cardiac arrest, complete heart block)  Very Low: 0 points = 0.4 % complication. Low: 1 point = 0.9 %, Moderate: 2 points = 6.6 %, High: 3+ points = 11%.      Barbara Plye P.A.-C.

## 2018-03-29 NOTE — LETTER
March 29, 2018       Patient: Hilda Blake   YOB: 1940   Date of Visit: 3/29/2018         To Whom It May Concern:    Hilda Blake is a patient of mine whom I saw today for pre-operative clearance for upcoming right eye surgery on 4/4/18. She is medically cleared for surgery. Please see my attached preoperative history and exam.    If you have any questions or concerns, please don't hesitate to call [phone]          Sincerely,          Barbara Pyle P.A.-C.  Electronically Signed

## 2018-04-09 ENCOUNTER — PATIENT OUTREACH (OUTPATIENT)
Dept: HEALTH INFORMATION MANAGEMENT | Facility: OTHER | Age: 78
End: 2018-04-09

## 2018-05-02 ENCOUNTER — APPOINTMENT (OUTPATIENT)
Dept: RADIOLOGY | Facility: MEDICAL CENTER | Age: 78
DRG: 964 | End: 2018-05-02
Attending: EMERGENCY MEDICINE
Payer: MEDICARE

## 2018-05-02 ENCOUNTER — HOSPITAL ENCOUNTER (INPATIENT)
Facility: MEDICAL CENTER | Age: 78
LOS: 5 days | DRG: 964 | End: 2018-05-07
Attending: EMERGENCY MEDICINE | Admitting: INTERNAL MEDICINE
Payer: MEDICARE

## 2018-05-02 ENCOUNTER — APPOINTMENT (OUTPATIENT)
Dept: RADIOLOGY | Facility: MEDICAL CENTER | Age: 78
DRG: 964 | End: 2018-05-02
Attending: NEUROLOGICAL SURGERY
Payer: MEDICARE

## 2018-05-02 DIAGNOSIS — T79.6XXA TRAUMATIC RHABDOMYOLYSIS, INITIAL ENCOUNTER (HCC): ICD-10-CM

## 2018-05-02 DIAGNOSIS — S09.90XA CLOSED HEAD INJURY, INITIAL ENCOUNTER: ICD-10-CM
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
## 2018-05-02 DIAGNOSIS — I62.00 SUBDURAL HEMORRHAGE (HCC): ICD-10-CM

## 2018-05-02 LAB
ALBUMIN SERPL BCP-MCNC: 3.8 G/DL (ref 3.2–4.9)
ALBUMIN/GLOB SERPL: 1.3 G/DL
ALP SERPL-CCNC: 90 U/L (ref 30–99)
ALT SERPL-CCNC: 34 U/L (ref 2–50)
ANION GAP SERPL CALC-SCNC: 11 MMOL/L (ref 0–11.9)
APPEARANCE UR: ABNORMAL
APTT PPP: 27.4 SEC (ref 24.7–36)
AST SERPL-CCNC: 50 U/L (ref 12–45)
BACTERIA #/AREA URNS HPF: NEGATIVE /HPF
BASOPHILS # BLD AUTO: 0.2 % (ref 0–1.8)
BASOPHILS # BLD: 0.03 K/UL (ref 0–0.12)
BILIRUB SERPL-MCNC: 2.5 MG/DL (ref 0.1–1.5)
BILIRUB UR QL STRIP.AUTO: NEGATIVE
BNP SERPL-MCNC: 70 PG/ML (ref 0–100)
BUN SERPL-MCNC: 48 MG/DL (ref 8–22)
CALCIUM SERPL-MCNC: 9.9 MG/DL (ref 8.5–10.5)
CHLORIDE SERPL-SCNC: 105 MMOL/L (ref 96–112)
CK SERPL-CCNC: 1089 U/L (ref 0–154)
CO2 SERPL-SCNC: 25 MMOL/L (ref 20–33)
COLOR UR: ABNORMAL
CREAT SERPL-MCNC: 1.42 MG/DL (ref 0.5–1.4)
EOSINOPHIL # BLD AUTO: 0.01 K/UL (ref 0–0.51)
EOSINOPHIL NFR BLD: 0.1 % (ref 0–6.9)
EPI CELLS #/AREA URNS HPF: ABNORMAL /HPF
ERYTHROCYTE [DISTWIDTH] IN BLOOD BY AUTOMATED COUNT: 43.6 FL (ref 35.9–50)
GLOBULIN SER CALC-MCNC: 3 G/DL (ref 1.9–3.5)
GLUCOSE SERPL-MCNC: 190 MG/DL (ref 65–99)
GLUCOSE UR STRIP.AUTO-MCNC: NEGATIVE MG/DL
HCT VFR BLD AUTO: 48.8 % (ref 37–47)
HGB BLD-MCNC: 17.1 G/DL (ref 12–16)
HYALINE CASTS #/AREA URNS LPF: ABNORMAL /LPF
IMM GRANULOCYTES # BLD AUTO: 0.07 K/UL (ref 0–0.11)
IMM GRANULOCYTES NFR BLD AUTO: 0.4 % (ref 0–0.9)
INR PPP: 1.14 (ref 0.87–1.13)
KETONES UR STRIP.AUTO-MCNC: 15 MG/DL
LEUKOCYTE ESTERASE UR QL STRIP.AUTO: ABNORMAL
LIPASE SERPL-CCNC: <3 U/L (ref 11–82)
LYMPHOCYTES # BLD AUTO: 1.05 K/UL (ref 1–4.8)
LYMPHOCYTES NFR BLD: 6.7 % (ref 22–41)
MAGNESIUM SERPL-MCNC: 2 MG/DL (ref 1.5–2.5)
MCH RBC QN AUTO: 31.5 PG (ref 27–33)
MCHC RBC AUTO-ENTMCNC: 35 G/DL (ref 33.6–35)
MCV RBC AUTO: 90 FL (ref 81.4–97.8)
MICRO URNS: ABNORMAL
MONOCYTES # BLD AUTO: 1.55 K/UL (ref 0–0.85)
MONOCYTES NFR BLD AUTO: 9.9 % (ref 0–13.4)
NEUTROPHILS # BLD AUTO: 12.95 K/UL (ref 2–7.15)
NEUTROPHILS NFR BLD: 82.7 % (ref 44–72)
NITRITE UR QL STRIP.AUTO: NEGATIVE
NRBC # BLD AUTO: 0 K/UL
NRBC BLD-RTO: 0 /100 WBC
PH UR STRIP.AUTO: 5 [PH]
PLATELET # BLD AUTO: 177 K/UL (ref 164–446)
PMV BLD AUTO: 11.8 FL (ref 9–12.9)
POTASSIUM SERPL-SCNC: 3.5 MMOL/L (ref 3.6–5.5)
PROT SERPL-MCNC: 6.8 G/DL (ref 6–8.2)
PROT UR QL STRIP: 30 MG/DL
PROTHROMBIN TIME: 14.3 SEC (ref 12–14.6)
RBC # BLD AUTO: 5.42 M/UL (ref 4.2–5.4)
RBC # URNS HPF: ABNORMAL /HPF
RBC UR QL AUTO: ABNORMAL
SODIUM SERPL-SCNC: 141 MMOL/L (ref 135–145)
SP GR UR STRIP.AUTO: 1.02
TROPONIN I SERPL-MCNC: 0.14 NG/ML (ref 0–0.04)
TSH SERPL DL<=0.005 MIU/L-ACNC: 1.82 UIU/ML (ref 0.38–5.33)
UROBILINOGEN UR STRIP.AUTO-MCNC: 1 MG/DL
WBC # BLD AUTO: 15.7 K/UL (ref 4.8–10.8)
WBC #/AREA URNS HPF: ABNORMAL /HPF

## 2018-05-02 PROCEDURE — 93005 ELECTROCARDIOGRAM TRACING: CPT | Performed by: EMERGENCY MEDICINE

## 2018-05-02 PROCEDURE — 82550 ASSAY OF CK (CPK): CPT

## 2018-05-02 PROCEDURE — 93005 ELECTROCARDIOGRAM TRACING: CPT | Performed by: STUDENT IN AN ORGANIZED HEALTH CARE EDUCATION/TRAINING PROGRAM

## 2018-05-02 PROCEDURE — 85730 THROMBOPLASTIN TIME PARTIAL: CPT

## 2018-05-02 PROCEDURE — 73030 X-RAY EXAM OF SHOULDER: CPT | Mod: LT

## 2018-05-02 PROCEDURE — 83690 ASSAY OF LIPASE: CPT

## 2018-05-02 PROCEDURE — 70450 CT HEAD/BRAIN W/O DYE: CPT

## 2018-05-02 PROCEDURE — 83880 ASSAY OF NATRIURETIC PEPTIDE: CPT

## 2018-05-02 PROCEDURE — 700101 HCHG RX REV CODE 250: Performed by: INTERNAL MEDICINE

## 2018-05-02 PROCEDURE — 84484 ASSAY OF TROPONIN QUANT: CPT

## 2018-05-02 PROCEDURE — 700101 HCHG RX REV CODE 250: Performed by: STUDENT IN AN ORGANIZED HEALTH CARE EDUCATION/TRAINING PROGRAM

## 2018-05-02 PROCEDURE — A9270 NON-COVERED ITEM OR SERVICE: HCPCS | Performed by: STUDENT IN AN ORGANIZED HEALTH CARE EDUCATION/TRAINING PROGRAM

## 2018-05-02 PROCEDURE — 700102 HCHG RX REV CODE 250 W/ 637 OVERRIDE(OP): Performed by: STUDENT IN AN ORGANIZED HEALTH CARE EDUCATION/TRAINING PROGRAM

## 2018-05-02 PROCEDURE — 85610 PROTHROMBIN TIME: CPT

## 2018-05-02 PROCEDURE — 80053 COMPREHEN METABOLIC PANEL: CPT

## 2018-05-02 PROCEDURE — 71045 X-RAY EXAM CHEST 1 VIEW: CPT

## 2018-05-02 PROCEDURE — 770022 HCHG ROOM/CARE - ICU (200)

## 2018-05-02 PROCEDURE — 81001 URINALYSIS AUTO W/SCOPE: CPT

## 2018-05-02 PROCEDURE — 87086 URINE CULTURE/COLONY COUNT: CPT

## 2018-05-02 PROCEDURE — 99291 CRITICAL CARE FIRST HOUR: CPT

## 2018-05-02 PROCEDURE — 700105 HCHG RX REV CODE 258: Performed by: EMERGENCY MEDICINE

## 2018-05-02 PROCEDURE — 73020 X-RAY EXAM OF SHOULDER: CPT | Mod: LT

## 2018-05-02 PROCEDURE — 83735 ASSAY OF MAGNESIUM: CPT

## 2018-05-02 PROCEDURE — 85025 COMPLETE CBC W/AUTO DIFF WBC: CPT

## 2018-05-02 PROCEDURE — 93010 ELECTROCARDIOGRAM REPORT: CPT | Performed by: INTERNAL MEDICINE

## 2018-05-02 PROCEDURE — 84443 ASSAY THYROID STIM HORMONE: CPT

## 2018-05-02 RX ORDER — LABETALOL HYDROCHLORIDE 5 MG/ML
10 INJECTION, SOLUTION INTRAVENOUS EVERY 6 HOURS PRN
Status: DISCONTINUED | OUTPATIENT
Start: 2018-05-02 | End: 2018-05-06

## 2018-05-02 RX ORDER — BRIMONIDINE TARTRATE 2 MG/ML
1 SOLUTION/ DROPS OPHTHALMIC 2 TIMES DAILY
Status: DISCONTINUED | OUTPATIENT
Start: 2018-05-02 | End: 2018-05-07 | Stop reason: HOSPADM

## 2018-05-02 RX ORDER — SODIUM CHLORIDE 9 MG/ML
500 INJECTION, SOLUTION INTRAVENOUS ONCE
Status: COMPLETED | OUTPATIENT
Start: 2018-05-02 | End: 2018-05-02

## 2018-05-02 RX ORDER — UREA 10 %
800 LOTION (ML) TOPICAL
Status: ON HOLD | COMMUNITY
End: 2018-05-07

## 2018-05-02 RX ORDER — BISACODYL 10 MG
10 SUPPOSITORY, RECTAL RECTAL
Status: DISCONTINUED | OUTPATIENT
Start: 2018-05-02 | End: 2018-05-07 | Stop reason: HOSPADM

## 2018-05-02 RX ORDER — AMOXICILLIN 250 MG
2 CAPSULE ORAL 2 TIMES DAILY
Status: DISCONTINUED | OUTPATIENT
Start: 2018-05-02 | End: 2018-05-07 | Stop reason: HOSPADM

## 2018-05-02 RX ORDER — POLYETHYLENE GLYCOL 3350 17 G/17G
1 POWDER, FOR SOLUTION ORAL
Status: DISCONTINUED | OUTPATIENT
Start: 2018-05-02 | End: 2018-05-07 | Stop reason: HOSPADM

## 2018-05-02 RX ORDER — LEVETIRACETAM 100 MG/ML
500 SOLUTION ORAL EVERY 12 HOURS
Status: DISCONTINUED | OUTPATIENT
Start: 2018-05-02 | End: 2018-05-07 | Stop reason: HOSPADM

## 2018-05-02 RX ORDER — TIMOLOL MALEATE 5 MG/ML
1 SOLUTION/ DROPS OPHTHALMIC 2 TIMES DAILY
Status: DISCONTINUED | OUTPATIENT
Start: 2018-05-02 | End: 2018-05-07 | Stop reason: HOSPADM

## 2018-05-02 RX ORDER — PREDNISOLONE ACETATE 10 MG/ML
1 SUSPENSION/ DROPS OPHTHALMIC 2 TIMES DAILY
COMMUNITY
End: 2019-01-11

## 2018-05-02 RX ORDER — MULTIVIT-MIN/IRON/FOLIC ACID/K 18-600-40
1 CAPSULE ORAL
COMMUNITY
End: 2019-01-11

## 2018-05-02 RX ORDER — NICOTINE 21 MG/24HR
14 PATCH, TRANSDERMAL 24 HOURS TRANSDERMAL
Status: DISCONTINUED | OUTPATIENT
Start: 2018-05-03 | End: 2018-05-07 | Stop reason: HOSPADM

## 2018-05-02 RX ORDER — PREDNISOLONE ACETATE 10 MG/ML
1 SUSPENSION/ DROPS OPHTHALMIC 4 TIMES DAILY
Status: DISCONTINUED | OUTPATIENT
Start: 2018-05-02 | End: 2018-05-07 | Stop reason: HOSPADM

## 2018-05-02 RX ORDER — BRIMONIDINE TARTRATE AND TIMOLOL MALEATE 2; 5 MG/ML; MG/ML
2 SOLUTION OPHTHALMIC SEE ADMIN INSTRUCTIONS
COMMUNITY

## 2018-05-02 RX ORDER — FOLIC ACID 1 MG/1
1 TABLET ORAL
Status: DISCONTINUED | OUTPATIENT
Start: 2018-05-04 | End: 2018-05-07 | Stop reason: HOSPADM

## 2018-05-02 RX ORDER — BRIMONIDINE TARTRATE AND TIMOLOL MALEATE 2; 5 MG/ML; MG/ML
1 SOLUTION OPHTHALMIC 2 TIMES DAILY
Status: DISCONTINUED | OUTPATIENT
Start: 2018-05-02 | End: 2018-05-02

## 2018-05-02 RX ORDER — SODIUM CHLORIDE AND POTASSIUM CHLORIDE 150; 900 MG/100ML; MG/100ML
INJECTION, SOLUTION INTRAVENOUS CONTINUOUS
Status: DISCONTINUED | OUTPATIENT
Start: 2018-05-02 | End: 2018-05-03

## 2018-05-02 RX ORDER — LATANOPROST 50 UG/ML
1 SOLUTION/ DROPS OPHTHALMIC
Status: DISCONTINUED | OUTPATIENT
Start: 2018-05-02 | End: 2018-05-07 | Stop reason: HOSPADM

## 2018-05-02 RX ADMIN — LATANOPROST 1 DROP: 50 SOLUTION OPHTHALMIC at 23:49

## 2018-05-02 RX ADMIN — TIMOLOL MALEATE 1 DROP: 5 SOLUTION OPHTHALMIC at 23:51

## 2018-05-02 RX ADMIN — BRIMONIDINE TARTRATE 1 DROP: 2 SOLUTION OPHTHALMIC at 23:50

## 2018-05-02 RX ADMIN — PREDNISOLONE ACETATE 1 DROP: 10 SUSPENSION/ DROPS OPHTHALMIC at 23:50

## 2018-05-02 RX ADMIN — SODIUM CHLORIDE 500 ML: 9 INJECTION, SOLUTION INTRAVENOUS at 18:05

## 2018-05-02 RX ADMIN — POTASSIUM CHLORIDE AND SODIUM CHLORIDE: 900; 150 INJECTION, SOLUTION INTRAVENOUS at 23:37

## 2018-05-02 ASSESSMENT — ENCOUNTER SYMPTOMS
FOCAL WEAKNESS: 0
PALPITATIONS: 0
LOSS OF CONSCIOUSNESS: 0
DOUBLE VISION: 0
COUGH: 0
HEADACHES: 1
SHORTNESS OF BREATH: 0
FALLS: 1
BLURRED VISION: 0
MYALGIAS: 1
DIZZINESS: 0
EYE DISCHARGE: 0
ROS SKIN COMMENTS: MULTIPLE BRUISES
WEAKNESS: 1
NERVOUS/ANXIOUS: 0
FEVER: 0
SORE THROAT: 0
NAUSEA: 0
EYE PAIN: 0
SENSORY CHANGE: 0
ABDOMINAL PAIN: 0
DEPRESSION: 0

## 2018-05-02 ASSESSMENT — LIFESTYLE VARIABLES
EVER_SMOKED: YES
SUBSTANCE_ABUSE: 0

## 2018-05-02 ASSESSMENT — COPD QUESTIONNAIRES
DURING THE PAST 4 WEEKS HOW MUCH DID YOU FEEL SHORT OF BREATH: NONE/LITTLE OF THE TIME
COPD SCREENING SCORE: 4
DO YOU EVER COUGH UP ANY MUCUS OR PHLEGM?: NO/ONLY WITH OCCASIONAL COLDS OR INFECTIONS
HAVE YOU SMOKED AT LEAST 100 CIGARETTES IN YOUR ENTIRE LIFE: YES

## 2018-05-02 ASSESSMENT — PAIN SCALES - GENERAL
PAINLEVEL_OUTOF10: 7
PAINLEVEL_OUTOF10: 4

## 2018-05-02 NOTE — ED TRIAGE NOTES
".  Chief Complaint   Patient presents with   • Groin Pain     right groin pain   • Shoulder Pain     left shoulder pain   • Elbow Pain     left elbow pain     ./68   Pulse (!) 114   Temp 36.6 °C (97.9 °F)   Resp 16   Ht 1.626 m (5' 4\")   Wt 53.1 kg (117 lb 1 oz)   SpO2 94%   BMI 20.09 kg/m²     Patient to triage with daughter with above complaints s/p MGLF on Monday, (+) hit head, (-) LOC, denies taking blood thinners, per daughter abrasion to left shoulder, no obvious deformity noted, seen by EMS after fall on Monday and AMA'd, educated on triage process, placed in lobby with daughter, told to inform staff of any changes in condition.  "

## 2018-05-02 NOTE — ED NOTES
To Y-64. Assisted with changing into gown.     Found down today after falling on Monday at an unknown time. Evidence of deep tissue injury to Right shoulder and lower back, various bruises, guarding LUE. Daughter at bedside.

## 2018-05-03 ENCOUNTER — APPOINTMENT (OUTPATIENT)
Dept: RADIOLOGY | Facility: MEDICAL CENTER | Age: 78
DRG: 964 | End: 2018-05-03
Attending: NEUROLOGICAL SURGERY
Payer: MEDICARE

## 2018-05-03 ENCOUNTER — APPOINTMENT (OUTPATIENT)
Dept: MEDICAL GROUP | Facility: PHYSICIAN GROUP | Age: 78
End: 2018-05-03
Payer: MEDICARE

## 2018-05-03 LAB
ALBUMIN SERPL BCP-MCNC: 3.2 G/DL (ref 3.2–4.9)
ALBUMIN SERPL BCP-MCNC: 3.6 G/DL (ref 3.2–4.9)
ALBUMIN/GLOB SERPL: 1.2 G/DL
ALBUMIN/GLOB SERPL: 1.4 G/DL
ALP SERPL-CCNC: 73 U/L (ref 30–99)
ALP SERPL-CCNC: 84 U/L (ref 30–99)
ALT SERPL-CCNC: 31 U/L (ref 2–50)
ALT SERPL-CCNC: 31 U/L (ref 2–50)
ANION GAP SERPL CALC-SCNC: 10 MMOL/L (ref 0–11.9)
ANION GAP SERPL CALC-SCNC: 11 MMOL/L (ref 0–11.9)
AST SERPL-CCNC: 48 U/L (ref 12–45)
AST SERPL-CCNC: 50 U/L (ref 12–45)
BASOPHILS # BLD AUTO: 0.2 % (ref 0–1.8)
BASOPHILS # BLD: 0.03 K/UL (ref 0–0.12)
BILIRUB SERPL-MCNC: 2.2 MG/DL (ref 0.1–1.5)
BILIRUB SERPL-MCNC: 2.4 MG/DL (ref 0.1–1.5)
BUN SERPL-MCNC: 47 MG/DL (ref 8–22)
BUN SERPL-MCNC: 50 MG/DL (ref 8–22)
CALCIUM SERPL-MCNC: 8.7 MG/DL (ref 8.5–10.5)
CALCIUM SERPL-MCNC: 9.5 MG/DL (ref 8.5–10.5)
CHLORIDE SERPL-SCNC: 108 MMOL/L (ref 96–112)
CHLORIDE SERPL-SCNC: 111 MMOL/L (ref 96–112)
CK SERPL-CCNC: 1025 U/L (ref 0–154)
CO2 SERPL-SCNC: 20 MMOL/L (ref 20–33)
CO2 SERPL-SCNC: 23 MMOL/L (ref 20–33)
CREAT SERPL-MCNC: 1.13 MG/DL (ref 0.5–1.4)
CREAT SERPL-MCNC: 1.32 MG/DL (ref 0.5–1.4)
EKG IMPRESSION: NORMAL
EOSINOPHIL # BLD AUTO: 0.08 K/UL (ref 0–0.51)
EOSINOPHIL NFR BLD: 0.6 % (ref 0–6.9)
ERYTHROCYTE [DISTWIDTH] IN BLOOD BY AUTOMATED COUNT: 45.8 FL (ref 35.9–50)
GLOBULIN SER CALC-MCNC: 2.6 G/DL (ref 1.9–3.5)
GLOBULIN SER CALC-MCNC: 2.7 G/DL (ref 1.9–3.5)
GLUCOSE SERPL-MCNC: 114 MG/DL (ref 65–99)
GLUCOSE SERPL-MCNC: 119 MG/DL (ref 65–99)
HCT VFR BLD AUTO: 46.8 % (ref 37–47)
HGB BLD-MCNC: 15.7 G/DL (ref 12–16)
IMM GRANULOCYTES # BLD AUTO: 0.05 K/UL (ref 0–0.11)
IMM GRANULOCYTES NFR BLD AUTO: 0.4 % (ref 0–0.9)
LV EJECT FRACT  99904: 65
LV EJECT FRACT MOD 4C 99902: 67.66
LYMPHOCYTES # BLD AUTO: 1.25 K/UL (ref 1–4.8)
LYMPHOCYTES NFR BLD: 10 % (ref 22–41)
MCH RBC QN AUTO: 31.1 PG (ref 27–33)
MCHC RBC AUTO-ENTMCNC: 33.5 G/DL (ref 33.6–35)
MCV RBC AUTO: 92.7 FL (ref 81.4–97.8)
MONOCYTES # BLD AUTO: 1.38 K/UL (ref 0–0.85)
MONOCYTES NFR BLD AUTO: 11 % (ref 0–13.4)
NEUTROPHILS # BLD AUTO: 9.72 K/UL (ref 2–7.15)
NEUTROPHILS NFR BLD: 77.8 % (ref 44–72)
NRBC # BLD AUTO: 0 K/UL
NRBC BLD-RTO: 0 /100 WBC
PLATELET # BLD AUTO: 147 K/UL (ref 164–446)
PMV BLD AUTO: 12.1 FL (ref 9–12.9)
POTASSIUM SERPL-SCNC: 3.4 MMOL/L (ref 3.6–5.5)
POTASSIUM SERPL-SCNC: 4.4 MMOL/L (ref 3.6–5.5)
PROT SERPL-MCNC: 5.9 G/DL (ref 6–8.2)
PROT SERPL-MCNC: 6.2 G/DL (ref 6–8.2)
RBC # BLD AUTO: 5.05 M/UL (ref 4.2–5.4)
SODIUM SERPL-SCNC: 141 MMOL/L (ref 135–145)
SODIUM SERPL-SCNC: 142 MMOL/L (ref 135–145)
TROPONIN I SERPL-MCNC: 0.07 NG/ML (ref 0–0.04)
TROPONIN I SERPL-MCNC: 0.11 NG/ML (ref 0–0.04)
WBC # BLD AUTO: 12.5 K/UL (ref 4.8–10.8)

## 2018-05-03 PROCEDURE — 80053 COMPREHEN METABOLIC PANEL: CPT

## 2018-05-03 PROCEDURE — 70450 CT HEAD/BRAIN W/O DYE: CPT

## 2018-05-03 PROCEDURE — 93306 TTE W/DOPPLER COMPLETE: CPT | Mod: 26 | Performed by: INTERNAL MEDICINE

## 2018-05-03 PROCEDURE — 700102 HCHG RX REV CODE 250 W/ 637 OVERRIDE(OP): Performed by: INTERNAL MEDICINE

## 2018-05-03 PROCEDURE — 700105 HCHG RX REV CODE 258: Performed by: INTERNAL MEDICINE

## 2018-05-03 PROCEDURE — 93306 TTE W/DOPPLER COMPLETE: CPT

## 2018-05-03 PROCEDURE — 90471 IMMUNIZATION ADMIN: CPT

## 2018-05-03 PROCEDURE — 85025 COMPLETE CBC W/AUTO DIFF WBC: CPT

## 2018-05-03 PROCEDURE — 770001 HCHG ROOM/CARE - MED/SURG/GYN PRIV*

## 2018-05-03 PROCEDURE — 84484 ASSAY OF TROPONIN QUANT: CPT

## 2018-05-03 PROCEDURE — 700102 HCHG RX REV CODE 250 W/ 637 OVERRIDE(OP): Performed by: STUDENT IN AN ORGANIZED HEALTH CARE EDUCATION/TRAINING PROGRAM

## 2018-05-03 PROCEDURE — 93880 EXTRACRANIAL BILAT STUDY: CPT | Mod: 26 | Performed by: SURGERY

## 2018-05-03 PROCEDURE — A9270 NON-COVERED ITEM OR SERVICE: HCPCS | Performed by: INTERNAL MEDICINE

## 2018-05-03 PROCEDURE — 93880 EXTRACRANIAL BILAT STUDY: CPT

## 2018-05-03 PROCEDURE — 93970 EXTREMITY STUDY: CPT | Mod: 26 | Performed by: SURGERY

## 2018-05-03 PROCEDURE — 700111 HCHG RX REV CODE 636 W/ 250 OVERRIDE (IP): Performed by: INTERNAL MEDICINE

## 2018-05-03 PROCEDURE — 90670 PCV13 VACCINE IM: CPT | Performed by: INTERNAL MEDICINE

## 2018-05-03 PROCEDURE — 93970 EXTREMITY STUDY: CPT

## 2018-05-03 PROCEDURE — A9270 NON-COVERED ITEM OR SERVICE: HCPCS | Performed by: STUDENT IN AN ORGANIZED HEALTH CARE EDUCATION/TRAINING PROGRAM

## 2018-05-03 RX ORDER — SODIUM CHLORIDE 9 MG/ML
INJECTION, SOLUTION INTRAVENOUS CONTINUOUS
Status: DISCONTINUED | OUTPATIENT
Start: 2018-05-03 | End: 2018-05-05

## 2018-05-03 RX ADMIN — TIMOLOL MALEATE 1 DROP: 5 SOLUTION OPHTHALMIC at 09:04

## 2018-05-03 RX ADMIN — PREDNISOLONE ACETATE 1 DROP: 10 SUSPENSION/ DROPS OPHTHALMIC at 17:39

## 2018-05-03 RX ADMIN — METOPROLOL TARTRATE 25 MG: 25 TABLET, FILM COATED ORAL at 09:00

## 2018-05-03 RX ADMIN — PREDNISOLONE ACETATE 1 DROP: 10 SUSPENSION/ DROPS OPHTHALMIC at 09:00

## 2018-05-03 RX ADMIN — PREDNISOLONE ACETATE 1 DROP: 10 SUSPENSION/ DROPS OPHTHALMIC at 20:17

## 2018-05-03 RX ADMIN — BRIMONIDINE TARTRATE 1 DROP: 2 SOLUTION OPHTHALMIC at 09:04

## 2018-05-03 RX ADMIN — STANDARDIZED SENNA CONCENTRATE AND DOCUSATE SODIUM 2 TABLET: 8.6; 5 TABLET, FILM COATED ORAL at 20:17

## 2018-05-03 RX ADMIN — PREDNISOLONE ACETATE 1 DROP: 10 SUSPENSION/ DROPS OPHTHALMIC at 13:12

## 2018-05-03 RX ADMIN — PNEUMOCOCCAL 13-VALENT CONJUGATE VACCINE 0.5 ML: 2.2; 2.2; 2.2; 2.2; 2.2; 4.4; 2.2; 2.2; 2.2; 2.2; 2.2; 2.2; 2.2 INJECTION, SUSPENSION INTRAMUSCULAR at 15:41

## 2018-05-03 RX ADMIN — BRIMONIDINE TARTRATE 1 DROP: 2 SOLUTION OPHTHALMIC at 20:16

## 2018-05-03 RX ADMIN — LEVETIRACETAM 500 MG: 100 SOLUTION ORAL at 09:00

## 2018-05-03 RX ADMIN — METOPROLOL TARTRATE 25 MG: 25 TABLET, FILM COATED ORAL at 20:17

## 2018-05-03 RX ADMIN — STANDARDIZED SENNA CONCENTRATE AND DOCUSATE SODIUM 2 TABLET: 8.6; 5 TABLET, FILM COATED ORAL at 09:00

## 2018-05-03 RX ADMIN — NICOTINE 14 MG: 14 PATCH, EXTENDED RELEASE TRANSDERMAL at 05:25

## 2018-05-03 RX ADMIN — SODIUM CHLORIDE: 9 INJECTION, SOLUTION INTRAVENOUS at 09:00

## 2018-05-03 RX ADMIN — SODIUM CHLORIDE: 9 INJECTION, SOLUTION INTRAVENOUS at 17:39

## 2018-05-03 RX ADMIN — LEVETIRACETAM 500 MG: 100 SOLUTION ORAL at 20:18

## 2018-05-03 RX ADMIN — LATANOPROST 1 DROP: 50 SOLUTION OPHTHALMIC at 20:17

## 2018-05-03 RX ADMIN — TIMOLOL MALEATE 1 DROP: 5 SOLUTION OPHTHALMIC at 20:16

## 2018-05-03 ASSESSMENT — LIFESTYLE VARIABLES
EVER HAD A DRINK FIRST THING IN THE MORNING TO STEADY YOUR NERVES TO GET RID OF A HANGOVER: NO
EVER_SMOKED: YES
CONSUMPTION TOTAL: NEGATIVE
HAVE YOU EVER FELT YOU SHOULD CUT DOWN ON YOUR DRINKING: NO
ALCOHOL_USE: YES
TOTAL SCORE: 0
HOW MANY TIMES IN THE PAST YEAR HAVE YOU HAD 5 OR MORE DRINKS IN A DAY: 0
TOTAL SCORE: 0
HAVE PEOPLE ANNOYED YOU BY CRITICIZING YOUR DRINKING: NO
TOTAL SCORE: 0
ON A TYPICAL DAY WHEN YOU DRINK ALCOHOL HOW MANY DRINKS DO YOU HAVE: 2
AVERAGE NUMBER OF DAYS PER WEEK YOU HAVE A DRINK CONTAINING ALCOHOL: 2
SUBSTANCE_ABUSE: 0
EVER FELT BAD OR GUILTY ABOUT YOUR DRINKING: NO

## 2018-05-03 ASSESSMENT — PATIENT HEALTH QUESTIONNAIRE - PHQ9
2. FEELING DOWN, DEPRESSED, IRRITABLE, OR HOPELESS: NOT AT ALL
SUM OF ALL RESPONSES TO PHQ9 QUESTIONS 1 AND 2: 0
1. LITTLE INTEREST OR PLEASURE IN DOING THINGS: NOT AT ALL

## 2018-05-03 ASSESSMENT — ENCOUNTER SYMPTOMS
BACK PAIN: 0
STRIDOR: 0
CHILLS: 0
SHORTNESS OF BREATH: 0
SPUTUM PRODUCTION: 0
NECK PAIN: 0
FEVER: 0
FOCAL WEAKNESS: 0
BLURRED VISION: 0
RESPIRATORY NEGATIVE: 1
DIZZINESS: 0
HEADACHES: 1
CONSTIPATION: 0
SENSORY CHANGE: 0
DOUBLE VISION: 0
SINUS PAIN: 0
SORE THROAT: 0
FALLS: 1
WEAKNESS: 1
DEPRESSION: 0
HEADACHES: 0
HEMOPTYSIS: 0
POLYDIPSIA: 0
NERVOUS/ANXIOUS: 0
GASTROINTESTINAL NEGATIVE: 1
CARDIOVASCULAR NEGATIVE: 1
SPEECH CHANGE: 0
ABDOMINAL PAIN: 0
DIAPHORESIS: 0
NAUSEA: 0
BRUISES/BLEEDS EASILY: 0
PHOTOPHOBIA: 0
TREMORS: 0
PALPITATIONS: 0
TINGLING: 0
ORTHOPNEA: 0
EYE PAIN: 0
COUGH: 0
DIARRHEA: 0
VOMITING: 0
HALLUCINATIONS: 0
MYALGIAS: 0
SEIZURES: 0
EYES NEGATIVE: 1

## 2018-05-03 ASSESSMENT — COPD QUESTIONNAIRES
DO YOU EVER COUGH UP ANY MUCUS OR PHLEGM?: YES, A FEW DAYS A WEEK OR MONTH
HAVE YOU SMOKED AT LEAST 100 CIGARETTES IN YOUR ENTIRE LIFE: YES
DURING THE PAST 4 WEEKS HOW MUCH DID YOU FEEL SHORT OF BREATH: SOME OF THE TIME
COPD SCREENING SCORE: 7
IN THE PAST 12 MONTHS DO YOU DO LESS THAN YOU USED TO BECAUSE OF YOUR BREATHING PROBLEMS: AGREE

## 2018-05-03 ASSESSMENT — PAIN SCALES - GENERAL
PAINLEVEL_OUTOF10: 3
PAINLEVEL_OUTOF10: 0
PAINLEVEL_OUTOF10: 3
PAINLEVEL_OUTOF10: 0
PAINLEVEL_OUTOF10: 3
PAINLEVEL_OUTOF10: 3
PAINLEVEL_OUTOF10: 0
PAINLEVEL_OUTOF10: 4
PAINLEVEL_OUTOF10: 3
PAINLEVEL_OUTOF10: 2
PAINLEVEL_OUTOF10: 3

## 2018-05-03 NOTE — ED NOTES
Bedside report completed w/ icu nurses.  Attempted to update the pt dtr, Alis,  no ans and no voice mail available.

## 2018-05-03 NOTE — CARE PLAN
Problem: Safety  Goal: Will remain free from injury  Outcome: PROGRESSING AS EXPECTED  Pt had no falls or injuries this shift. Pt calls appropriately for all needs. Bed/strip alarm in place at all times. Lap belt in place in case pt becomes forgetful and pt demonstrates ability to unlatch. Falls education completed.     Problem: Discharge Barriers/Planning  Goal: Patient's continuum of care needs will be met  Outcome: PROGRESSING AS EXPECTED  POC reviewed with patient, family members, and interdisciplinary team throughout shift. Pt and family verbalize understanding.    Problem: Skin Integrity  Goal: Risk for impaired skin integrity will decrease  Outcome: PROGRESSING AS EXPECTED  Pt turned in bed every 2 hours. Prophylactic foam mepilex applied to coccyx. Heels floated on pillows. Silicone O2 tubing placed. Full head to toe assessment performed every 2 hours and all equipment monitored to ensure no skin breakdown.

## 2018-05-03 NOTE — ED NOTES
Pt attempting to get out of bed.  Has been inct of urine.  clns and new bedding applied.  Call light in reach and pt is near the nurse's station.

## 2018-05-03 NOTE — PROGRESS NOTES
"[facility] ICU Progress Note      Admit Date: 5/2/2018  ICU Day: 1    Resident(s): Bharti Boss  Attending: APURVA GORDON/ Dr. Kuhn      HPI:  78 y/o f with a pmh of HTN, DLD, osteopenia, breast CA (s/p mastectomy and radiation 2004), tobacco use brought to the ED by her family after she was found down. She was last seen at her baseline 4/30/18. States she was looking for her phone in a room filled with many objects, she tripped and fell and remembers hitting her head. States her legs \"gave out and didn't seem to want to work\" afterwards and wasn't able to stand for about 12 hours. She never lost consciousness but state although she remembered what happened her overall memory of the event is a little off. Both lower extremities bilaterally, no side was weaker than there other. No bleeding from the head. She had L shoulder pain, Xray in the ED shows no dislocation. She has ecchymosis on her R hip, weak but able to move all extremities. She was reported to have dysarthria on admission.  Labs on admission showed mild rhabdo and MARISELA. Started on IV fluids.   CT head showed small subdural hematoma, neurosurgery consutled and she was admitted to ICU.      Consultants:  Neurosurgery   PMA: Peyman Alston    Interval Events:  Repeat CT head shows no progression of bleed. Per neurosurgery fine to transfer out of ICU.  No neurological deficits. Chronic R eye ptosis  LE US: no DVT  Carotid US: <50% stenosis bilaterally  Afebrile, hemodynamically stable  MARISELA and Rhabdo improving per labs  Still weak, PT/OT ordered    Review of Systems   Constitutional: Negative for chills and fever.   HENT: Negative.  Negative for sore throat.    Eyes: Negative.    Respiratory: Negative.    Cardiovascular: Negative.    Gastrointestinal: Negative.    Genitourinary: Negative.    Musculoskeletal:        L shoulder pain   Skin: Negative.  Negative for rash.   Neurological: Positive for weakness. Negative for dizziness, tingling, tremors, sensory " "change, speech change, focal weakness and headaches.        Generalized weakness   Endo/Heme/Allergies: Negative.        PHYSICAL EXAM  Vitals:    05/03/18 1100 05/03/18 1200 05/03/18 1300 05/03/18 1400   BP:       Pulse: 61 62 (!) 54 70   Resp: (!) 45 (!) 23 (!) 22 18   Temp:  36.8 °C (98.2 °F)  36.8 °C (98.3 °F)   SpO2: 96% 96% 99% 99%   Weight:       Height:         Body mass index is 19.79 kg/m².  /68   Pulse 70   Temp 36.8 °C (98.3 °F)   Resp 18   Ht 1.626 m (5' 4\")   Wt 52.3 kg (115 lb 4.8 oz)   SpO2 99%   BMI 19.79 kg/m²   O2 therapy: Pulse Oximetry: 99 %, O2 (LPM): 2, O2 Delivery: Nasal Cannula    Physical Exam   Constitutional: She is well-developed, well-nourished, and in no distress. No distress.   HENT:   Head: Normocephalic and atraumatic.   Mouth/Throat: Oropharynx is clear and moist. No oropharyngeal exudate.   Eyes: Conjunctivae and EOM are normal. Pupils are equal, round, and reactive to light. No scleral icterus.   Neck: Normal range of motion. Neck supple.   Cardiovascular: Normal rate and normal heart sounds.    No murmur heard.  Pulmonary/Chest: Effort normal and breath sounds normal. No respiratory distress. She has no wheezes. She has no rales. She exhibits no tenderness.   Abdominal: Soft. Bowel sounds are normal. She exhibits no distension. There is no tenderness. There is no rebound and no guarding.   Neurological: She is alert. No cranial nerve deficit.   A&Ox2, to person and place not to time. 5/5 strength in upper extremities bilaterally. 4/5 strength in lower extremities bilaterally. Resting tremor L>R upper extremity   Skin: Skin is warm and dry. She is not diaphoretic.   ecchymosis over R hip        Respiratory:     Respiration: 18, Pulse Oximetry: 99 %, O2 Daily Delivery Respiratory : Room Air with O2 Available    Chest Tube Drains:          Invalid input(s): [id number]    HemoDynamics:  Pulse: 70, Heart Rate (Monitored): 70 NIBP: 113/62  "     Neuro:      Fluids:    Date 18 0700 - 18 0659   Shift 1464-1380 2455-0925 8487-7294 24 Hour Total   I  N  T  A  K  E   P.O. 120   120      P.O. 120   120    I.V. 200   200      IV Volume (NS 20 of K ) 200   200    Shift Total 320   320   O  U  T  P  U  T   Urine 100   100      Output (mL) (Urinary Catheter Indwelling Catheter 16 Estonian) 100   100    Shift Total 100   100      220        Intake/Output Summary (Last 24 hours) at 18 1346  Last data filed at 18 0900   Gross per 24 hour   Intake             1320 ml   Output              650 ml   Net              670 ml       Weight: 52.3 kg (115 lb 4.8 oz)  Body mass index is 19.79 kg/m².    Recent Labs      18   SODIUM  141  141  142   POTASSIUM  3.5*  3.4*  4.4   CHLORIDE  105  108  111   CO2  25  23  20   BUN  48*  50*  47*   CREATININE  1.42*  1.32  1.13   MAGNESIUM  2.0   --    --    CALCIUM  9.9  9.5  8.7       GI/Nutrition:  Recent Labs      18   0520   ALTSGPT  34  31  31   ASTSGOT  50*  50*  48*   ALKPHOSPHAT  90  84  73   TBILIRUBIN  2.5*  2.4*  2.2*   LIPASE  <3*   --    --    GLUCOSE  190*  114*  119*       Heme:  Recent Labs      18   RBC  5.42*  5.05   HEMOGLOBIN  17.1*  15.7   HEMATOCRIT  48.8*  46.8   PLATELETCT  177  147*   PROTHROMBTM  14.3   --    APTT  27.4   --    INR  1.14*   --        Infectious Disease:  Temp  Av.9 °C (98.4 °F)  Min: 36.7 °C (98 °F)  Max: 37.4 °C (99.3 °F)  Recent Labs      18   0520  18   0619   WBC  15.7*   --    --   12.5*   NEUTSPOLYS  82.70*   --    --   77.80*   LYMPHOCYTES  6.70*   --    --   10.00*   MONOCYTES  9.90   --    --   11.00   EOSINOPHILS  0.10   --    --   0.60   BASOPHILS  0.20   --    --   0.20   ASTSGOT  50*  50*  48*   --    ALTSGPT  34  31  31   --    ALKPHOSPHAT  90  84  73   --    TBILIRUBIN  2.5*  2.4*   2.2*   --        Meds:  • pneumococcal 13-Hanna Conj Vacc  0.5 mL     • NS   83 mL/hr at 05/03/18 0900   • senna-docusate  2 Tab      And   • polyethylene glycol/lytes  1 Packet      And   • magnesium hydroxide  30 mL      And   • bisacodyl  10 mg     • Respiratory Care per Protocol       • nicotine  14 mg      And   • nicotine polacrilex  2 mg     • levETIRAcetam  500 mg     • [START ON 5/4/2018] vitamin D  2,000 Units     • [START ON 5/4/2018] folic acid  1 mg     • latanoprost  1 Drop     • metoprolol  25 mg     • prednisoLONE acetate  1 Drop     • labetalol  10 mg     • brimonidine  1 Drop      And   • timolol  1 Drop          Procedures:  None    Imaging:  CAROTID DUPLEX         LE VENOUS DUPLEX (DVT)         CT-HEAD W/O   Final Result         1.  Hyperdensity along the left tentorium, stable since prior, compatible with subdural versus subarachnoid hemorrhage.   2.  Changes of atrophy and small vessel ischemia.      DX-SHOULDER 1 VIEW LEFT   Final Result      No evidence of left shoulder dislocation.      DX-SHOULDER 2+ LEFT   Final Result      The humeral head is projected superiorly and posteriorly and the Y-view. This can be positional in nature or secondary to the posterior dislocation. Axillary views recommended for further evaluation.      DX-CHEST-PORTABLE (1 VIEW)   Final Result      Bilateral interstitial prominence. No focal consolidation.      CT-HEAD W/O   Final Result      Left tentorium small acute subdural hematoma.      Nonspecific white matter changes and cerebral volume loss.      Findings were discussed with ARJUN THORNE on 5/2/2018 4:53 PM.      ECHOCARDIOGRAM COMP W/O CONT    (Results Pending)       Problem and Plan:       #Subdural hematoma  -Small in the L tentorium on CT head in ED . Repeat CT head showed not worsening   -Ground-level fall 2 days ago. Intermittently confused. Family not available this AM for further hx  -on admission per notes, exam showed R side weakness and  dysarthria. Strength now equal bilaterally and no dysarthria  -Neurosurgery following and reviewed CT scans. No changes in hematoma, OK to transfer to floor per note. Hold antiplt  -SCD for DVT prophylaxis   -Q4 neuro checks  -BP goal <140/90  -labetalol PRN     #Elevated CPK  #MARISELA  -CPK 1089 down trending  -Cr baseline 0.8, on admission 1.4.  Improving now 1.13  -Given 500 mL normal saline in the ED  -BP stable, Tachycardic 90-100s, not requiring oxygen - EKG SR  -Denies CP, SOB, palpitaions  -NSS + KCL 20meq at 100ml/hr  -UA shows 10-20 WBC, leuk esterase, no bacteria or nitrites. Asymptomatic, will not start abx      #Ground Level Fall  - complaining of weakness, PT/OT ordered     #Dysmetria - right sided neglect  #Right upper ext weakness   -on admission, resolved 5/3 AM, no dysarthria  -carotid doppler shows <50% stenosis bilaterally   -no DVT on LE US  -ECHO pending  -consider MRI  -neuro to evaluate     #Hypokalemia  -K 3.5 on admission  -Replete and monitor     #Elevated troponin  -Trop 0.14--0.11--0.07  -No CP. Likely related to muscle injury, MARISELA     #Hypertension   -Has not been taking meds for 1 to 2 days and BP is in the 110s/50s  -Hold amlodipine and benazepril for now  -labetalol PRN     #Tobacco use - half pack a day for 60 years - no desire to quit - nicotine patch  #Osteopenia - on calcium and vitamin D       DISPO: Transfer to neurosurg     CODE STATUS: Full    Quality Measures:  Arias Catheter: none  DVT Prophylaxis: SCD  Ulcer Prophylaxis: none  Antibiotics: none  Lines: PIV

## 2018-05-03 NOTE — CONSULTS
"DATE OF SERVICE:  05/02/2018    CHIEF COMPLAINT:  Status post fall, mental status change, headache.    HISTORY OF PRESENT ILLNESS:  This is a 77-year-old white female, who presented   with complaints after a fall.  The patient was seen normal on Monday, but   then was with her daughter-in-law and went to visit her today when she was   found on the floor.  The patient probably was on her prone positioning as she   does have abrasions of her left anterior shoulder, left anterior pretibial   area, and her right hip.  Patient states she felt that she probably \"tripped.\"  She is amnestic to the event.  She has had no recent illnesses, complaints   including dysuria or urinary frequency.      The patient has had no known history   of intracranial abnormalities, but has had breast cancer with no evidence of   metastasis.  She has undergone surgery with radiation only.  She does take a   beta-blocker for hypertension and calcium channel blocker as well.  There was   also a concern that the patient may have had a left humeral head dislocation   posteriorly, which was negative and also underwent chest x-ray imaging, which   showed some radiation scarring from previous post-breast malignancy.  She does   have ptosis of the right eyelid, which is being worked up currently, but is   not new.  The patient does have some ongoing left-sided dysmetria with   weakness noted, especially with extension of her left arm.  She otherwise   complains of some moderate discomfort at the locations of her abrasions from   her fall and being found down.  There were also signs on workup of a mild   rhabdomyolysis of which she is receiving IV fluids.  CK was only in the low   1000 range.      The patient had also a CT of the brain that showed a left   tentorium, acute subdural hematoma and as per Dr. Bloom's report to the   [facility] resident at neurosurgery felt that this was a nonoperable   intervention and there is no sign of significant " neurologic decline acutely.    There is no obvious shift or need for evacuation and no sign of significant   worsened or progressive neurologic change.    ALLERGIES:  INCLUDE TAMOXIFEN.    MEDICATIONS:  The patient's listed medications are amlodipine, aspirin,   Lotensin, vitamin D, folic acid, Motrin, Dilantin, metoprolol, prednisolone.    PAST MEDICAL HISTORY:  Breast cancer diagnosed in 2004, glaucoma.  She has had   hemangioma of the liver diagnosed with biopsy, hypertension, hyperlipidemia,   osteopenia, ptosis of the left eyelid.    SOCIAL HISTORY:  Current everyday smoker, 61-pack years with half pack per day   currently.  She has had 1-2 shots of alcohol on a weekly basis.  No   significant drug abuse reported.    REVIEW OF SYSTEMS:  Reviewed and otherwise negative except described above in   HPI.    CURRENT PHYSICAL EXAMINATION:  GENERAL:  The patient is following commands.  VITAL SIGNS:  Currently her vital signs in the ER on my evaluation was 120   systolic over 59 without any antihypertensives with a MAP of 69.  Her   respirations were 16.  Temperature was 36.6.  HEENT:  Shows that she does have ptosis of the right eye.  She has no pain   over the posterior occipital region.  There are no obvious signs of bleeding   or trauma to palpation of the scalp.  She is notable to have pupils that are   around 4 mm and reactive.  There is no significant icterus or nystagmus.  She   has normal facial symmetry.  There was no epistaxis.  The oropharynx was   slightly dry, but able to protrude the tongue and deviate left to right   without difficulty.  NECK:  Showed no carotid bruits.  HEART:  Rate regular, normal S1, S2.  No obvious murmur, rub or gallop.  LUNGS:  Reveal breath sounds equally bilaterally anterolaterally with slight   chronic crackles in the mid thorax region.  She does have tattoo scars and   mastectomy scar noted.  The tattoo scars are from radiation.  ABDOMEN:  Appears soft, nontender.  No rebound,  rigidity or guarding.  No   hepatosplenomegaly.  EXTREMITIES:  Otherwise, including the left upper shoulder shows approximately   3 cm erythematous abrasion and some surrounding erythema likely from being   found down in a prone position or at least on her left lateral decubitus side.   There is also some mild erythema of the right hip and there is older   bruising at the pretibial areas bilaterally with some mild ecchymosis around   the left pretibial region.  She has full dorsalis pedis, posterior tibialis,   popliteal pulses as well as radial pulses.  NEUROLOGIC:  Please see HEENT and general above.  Also, again, she does have   chronic ptosis of the right side.  She has adequate  strength 5/5   bilaterally.  Her biceps strength is 5/5.  Her extension of her left elbow at   the triceps is slightly weak, possibly related to the fall, but no obvious   sign of dislocation of the posterior left shoulder.  Her hip showed negative   pelvic roll test.  She has ability to both flex and extend, internally and   externally rotate at the hip bilaterally.  She has full pulses at posterior   tibialis, dorsalis pedis and good cap refill.  She has some parkinsonian or at   least a resting tremor noted of the right arm greater than left.  She had   some mild dysmetria.  There was also notable right-sided neglect with   attempting to do finger-to-nose testing of the left compared to the right side   with inability to perform the test on the right upper arm, likely consistent   with the degree of hemorrhage.    LABORATORY DATA:  Showed the following:  White cell count was 15.7, H and H is   17.1 and 48.8, platelet count of 177.  Chemistry, sodium 141, potassium 3.5,   chloride 105, CO2 of 25, glucose of 190, BUN of 48 with a creatinine of 1.42,   calcium of 9.9, AST of 50, ALT of 34, ALP of 90, T-bili 2.5, albumin of 2.8,   lipase of less than 3.  CK was 1089 with troponin of 0.14.  BNP was 70.  She   had coagulation studies,  INR 1.14, PT 14.3 with PTT 27.4.    IMAGING:  CT of the brain as discussed above shows evidence of nonspecific   white matter changes and cerebral volume loss.  There is also left tentorium   small acute subdural hematoma.  Patient's other diagnostic studies show a   shoulder x-ray that shows no obvious acute dislocation of the left shoulder.    There were also 2 views of the same shoulder including a Y view that showed a   questionable early posterior dislocation, which was read as negative.  Chest   x-ray in my opinion shows some scarring from probable bilateral mastectomies   with some clippings noted from the mastectomies bilaterally.  There were also   some nodular and mild interstitial infiltrates at the bilateral lungs   consistent with probable radiation treatment.    EKG, this is my interpretation.  There is some concern with a sinus rhythm of   98, MT interval 148, QRS duration of 102 with QTc of 455, QRS duration of 31   with slight ST depression noted in lead II, III, aVF.  She has scattered   artifact, which limits the study.  There is poor R-wave progression.  There   are no obvious reciprocal changes of acute ischemic events.    IMPRESSION:  1.  Status post fall with a left tentorium subdural hematoma with layering of   blood.  2.  Acute dysmetria of the left side.  3.  Mild rhabdomyolysis with a CK of 1098.  4.  Mild abrasions to her left shoulder, hip and pretibial region.  5.  Hypertension.  6.  Hemangioma of liver reported, considered to be benign.  7.  History of bilateral breast malignancy with mastectomies and followup   radiation treatment with probable radiation pneumonitis.  8.  Possible infection with elevated WBC count of 15.  Rule out underlying   urinary infection.  9.  Mild acute kidney injury, likely related to rhabdomyolysis.  10.  Hypokalemia.  11.  EKG changes as noted with T-wave and ST changes in the inferior leads II,   III, aVF.    PLAN:  1.  Patient to be seen by neurology and  neurosurgery.  2.  Would place on anti-seizure therapy for prophylactic measures with Keppra.  3.  Blood pressure control, at this point blood pressure is 140-160.  4.  IV fluids as she does appear slightly hemoconcentrated.  5.  Would perform echocardiogram.  6.  Follow up troponin testing.  7.  Echocardiogram to rule out wall motion abnormalities of the inferior wall.  8.  Follow up CT scan to rule out any further bleeding or change in mental   status.  A CT scan would be ordered more urgently.  MRI may be helpful as   well.  9.  Further workup regarding her ptosis of her right eye at a later date.  10.  Continue to watch for her underlying dysmetria.  11.  Also observed for further parkinsonian disease and workup to follow.  12.  Speech evaluation for swallow.    The patient remains critically ill.     Critical care time so far is 45 minutes, not including procedures, nor overlap time.        ____________________________________     DO DANNA Hylton / SORAYA    DD:  05/02/2018 20:14:45  DT:  05/02/2018 21:10:39    D#:  [id number]  Job#:  [id number]

## 2018-05-03 NOTE — CONSULTS
Date: 5/2/2018    Requesting physician:Dr Bloom    Reason for consultation: Tentorial subdural hematoma    HPI  Hilda Blake is a 77-year-old female who was brought into the emergency department for evaluation after a fall and was noted to have some rhabdomyolysis as well as a small tentorial subdural hematoma on her head CT. She states that she likely trapped near her . She is not a particularly good historian. She denies headache or head pain although she states that she has extremity pain.    Past medical history  Breast cancer  Glaucoma  Hypertension  Hyperlipidemia  Osteopenia  Chronic right eye ptosis    Social history  Current daily smoker  Consumes alcohol occasionally    Past surgical history  Noncontributory    Home medications  Reviewed in the EMR    Allergies  Tamoxifen    Physical exam  General: No acute distress, lying comfortably in bed  Vital signs: Afebrile, vital signs stable  Neurologic: GCS 15, cranial nerves intact with the exception of right eye ptosis. Moves all extremities well    Imaging  Head CT on admission shows a very small tentorial subdural hematoma    Labs  Reviewed in EMR    Assessment  77-year-old female status post fall and rhabdomyolysis with a very small tentorial subdural hematoma    Plan  1. She is being admitted to the ICU for her rhabdomyolysis  2. She can be every 4 hours her checks  3. We will obtain a follow-up CT scan in the morning  4. Abstain from antiplatelets for 2 weeks  5. Okay for a general diet from my perspective  6. We will follow up with her in the morning      I spent 42 minutes in direct patient care, reviewing her electronic medical records, on history and physical examination as well as reviewing her imaging

## 2018-05-03 NOTE — ED NOTES
Resting.  No distress.  Call light in reach.  Pt is ans questions approp.  Short term memory is poor.  Easily redirected.

## 2018-05-03 NOTE — ED NOTES
Pt verbalized need to urinate. Pt placed on bedpan. 100ml collected from bedpan, urine sent to lab.

## 2018-05-03 NOTE — PROGRESS NOTES
Neurosurgery Progress Note    Subjective:  Found down in rhabdo with small tentorial sdh   Denies HA  Has longstanding ptosis on right  Repeat Head CT shows stable sdh without change    Exam:  AAOX3  GCS 15  CN2-12 intact with right ptosis  NARANJO well    BP  Min: 101/68  Max: 101/68  Pulse  Av.6  Min: 67  Max: 114  Resp  Av  Min: 11  Max: 23  Temp  Av.9 °C (98.5 °F)  Min: 36.6 °C (97.9 °F)  Max: 37.4 °C (99.3 °F)  SpO2  Av %  Min: 90 %  Max: 98 %    No Data Recorded    Recent Labs      18   1720  18   0619   WBC  15.7*  12.5*   RBC  5.42*  5.05   HEMOGLOBIN  17.1*  15.7   HEMATOCRIT  48.8*  46.8   MCV  90.0  92.7   MCH  31.5  31.1   MCHC  35.0  33.5*   RDW  43.6  45.8   PLATELETCT  177  147*   MPV  11.8  12.1     Recent Labs      18   1720  18   2355  18   0520   SODIUM  141  141  142   POTASSIUM  3.5*  3.4*  4.4   CHLORIDE  105  108  111   CO2  25  23  20   GLUCOSE  190*  114*  119*   BUN  48*  50*  47*   CREATININE  1.42*  1.32  1.13   CALCIUM  9.9  9.5  8.7     Recent Labs      18   1720   APTT  27.4   INR  1.14*           Intake/Output       18 07 - 18 0659 18 07 - 18 0659       Total  Total       Intake    I.V.  --  1000 1000  --  -- --    IV Volume (NS) -- 500 500 -- -- --    IV Volume (NS 20 of K ) -- 500 500 -- -- --    Total Intake -- 1000 1000 -- -- --       Output    Urine  --  550 550  --  -- --    Urine Void (mL) (non-catheter) -- 100 100 -- -- --    Output (mL) (Urinary Catheter Indwelling Catheter 16 Hebrew) -- 450 450 -- -- --    Stool  --  -- --  --  -- --    Number of Times Stooled -- 0 x 0 x -- -- --    Total Output -- 550 550 -- -- --       Net I/O     -- 450 450 -- -- --            Intake/Output Summary (Last 24 hours) at 18 0757  Last data filed at 18 0600   Gross per 24 hour   Intake             1000 ml   Output              550 ml   Net              450 ml             • pneumococcal 13-Hanna Conj Vacc  0.5 mL Once PRN   • NS   Continuous   • senna-docusate  2 Tab BID    And   • polyethylene glycol/lytes  1 Packet QDAY PRN    And   • magnesium hydroxide  30 mL QDAY PRN    And   • bisacodyl  10 mg QDAY PRN   • Respiratory Care per Protocol   Continuous RT   • nicotine  14 mg Daily-0600    And   • nicotine polacrilex  2 mg Q HOUR PRN   • levETIRAcetam  500 mg Q12HRS   • [START ON 5/4/2018] vitamin D  2,000 Units MO, WE + FR   • [START ON 5/4/2018] folic acid  1 mg MO, WE + FR   • latanoprost  1 Drop QHS   • metoprolol  25 mg BID   • prednisoLONE acetate  1 Drop 4X/DAY   • labetalol  10 mg Q6HRS PRN   • brimonidine  1 Drop BID    And   • timolol  1 Drop BID       Assessment and Plan:  Hospital day #2  No antiplatelets for 2 weeks  Ok for dvt ppx  Patient stable for transfer to floor from NS perspective when stable from intensivist perspective  Please call with questions

## 2018-05-03 NOTE — PROGRESS NOTES
"                    [facility] ICU Transfer Note                                                                                       ICU Admit Date:  5/2/2018       ICU Discharge Date:   5/3/2018      Primary Diagnosis:   Subdural Hematoma     ICU Course Summary (Brief Narrative):  78 y/o f with a pmh of HTN, DLD, osteopenia, breast CA (s/p mastectomy and radiation 2004), tobacco use brought to the ED by her family after she was found down. She was last seen at her baseline 4/30/18. States she was looking for her phone in a room filled with many objects, she tripped and fell and remembers hitting her head. States her legs \"gave out and didn't seem to want to work\" afterwards and wasn't able to stand for about 12 hours. She never lost consciousness but state although she remembered what happened her overall memory of the event is a little off. Both lower extremities bilaterally, no side was weaker than there other. No bleeding from the head. She had L shoulder pain, Xray in the ED shows no dislocation. She has ecchymosis on her R hip, weak but able to move all extremities. She was reported to have dysarthria on admission.  Labs on admission showed mild rhabdo and MARISELA. Started on IV fluids.   CT head showed small subdural hematoma, neurosurgery consutled and she was admitted to ICU.    On exam this ICU day 1 no LE weakness equal and bilateral, no UE weakness or any other acute neuro deficits.  Chronic R eye ptosis. Repeat CT head shows no progression of bleed. Per neurosurgery fine to transfer out of ICU. Continue holding antiplatelets and anticoagulation. Carotid US shows <50% stenosis bilaterally, no DVT on LE US. MARISELA and rhabdo improving. She has leukocytosis which is improving without abx, UA shows elevated WBC and leuk esterase, no bacteria or nitrites, asymptomatic, afebrile, hemodynamically stable. No abx started.     Important Events in the ICU:   - Central Line: (Dates of insertion and removal, if " applicable)n/a  - Intubation: (Date of intubation and extubation)n/a  - Pressors: (Type and Duration)n/a  - Arias catheter (Date of insertion and removal, if applicable)n/a  - Tube feeding (Start and stop dates)n/a  - Antibiotics (Type, start and stop dates)n/a  - Other procedures n/a          Labs and imaging studies to be continued with their indications:  - CBC:  (Indication) leukocytosis  - CMP or BMP: (Indication) hypoK  - Magnesium: (Indication)hypoK  - Phosphorus: (Indication)none  - Chest Xray: (indication) none  - Other studies: (indication)none    Things to follow:   Labs, Echo, Neuro and neurosurg recs

## 2018-05-03 NOTE — ED NOTES
Med rec complete per medication bottles at bedside (returned)  Allergies reviewed    Had right eye Cataract surgery 4-4-18  Hasn't taken her medications since 4-29-18

## 2018-05-03 NOTE — PROGRESS NOTES
Pulmonary Critical Care Progress Note        Date of service:  5/3/2018    Chief Complaint:  Headache    Interval Events:  24 hour interval history reviewed  Reason for visit:   SDH, rhabdomyolysis, acute kidney injury, hypertension  Seen with [employer] [facility]       - SR   - q 4 hour neuro checks   - oriented x 2-3   - 2 L NC      She has a headache today.  She denies any blurred vision, diplopia, photophobia.  She denies any numbness or tingling or focal weakness.  She denies any cough, sputum production or hemoptysis.  She has no dyspnea.  She is hungry.  She denies abdominal pain, nausea or vomiting.      Review of Systems   Constitutional: Negative for chills, diaphoresis and fever.   HENT: Negative for congestion, ear discharge, ear pain, nosebleeds, sinus pain and tinnitus.    Eyes: Negative for blurred vision, double vision, photophobia and pain.   Respiratory: Negative for cough, hemoptysis, sputum production, shortness of breath and stridor.    Cardiovascular: Negative for chest pain, palpitations, orthopnea and leg swelling.   Gastrointestinal: Negative for abdominal pain, constipation, diarrhea, nausea and vomiting.   Genitourinary: Negative for dysuria, frequency, hematuria and urgency.   Musculoskeletal: Positive for falls. Negative for back pain, myalgias and neck pain.   Skin: Negative for itching.   Neurological: Positive for headaches. Negative for sensory change, speech change, focal weakness and seizures.   Endo/Heme/Allergies: Negative for environmental allergies and polydipsia. Does not bruise/bleed easily.   Psychiatric/Behavioral: Negative for depression, hallucinations, substance abuse and suicidal ideas. The patient is not nervous/anxious.        Physical Exam   Constitutional: No distress.   Thin lady.  Very pleasant.   HENT:   Head: Normocephalic.   Right Ear: External ear normal.   Left Ear: External ear normal.   Nose: Nose normal.   Mouth/Throat: Oropharynx is clear and moist. No  oropharyngeal exudate.   Abrasion on the top of her head.   Eyes: Conjunctivae and EOM are normal. Pupils are equal, round, and reactive to light. Right eye exhibits no discharge. Left eye exhibits no discharge. No scleral icterus.   Neck: Neck supple. No JVD present. No tracheal deviation present.   Cardiovascular: Intact distal pulses.  Exam reveals no gallop and no friction rub.    No murmur heard.  Sinus rhythm   Pulmonary/Chest: Effort normal. No stridor. No respiratory distress. She has no wheezes. She has rales (Few scattered coarse crackles.).   Abdominal: Soft. Bowel sounds are normal. She exhibits no distension. There is no tenderness. There is no rebound.   Musculoskeletal:   No clubbing or cyanosis.  Bruising of the left shoulder.  Bruising of the right ankle.  Ecchymosis to the right hip region.   Neurological: She is alert. No cranial nerve deficit. GCS score is 15.   No focal weakness.   Skin: Skin is warm and dry. No rash noted. She is not diaphoretic. No pallor.       PFSH:  No change.    Respiratory:     Pulse Oximetry: 97 %  CXR personally reviewed and compared to prior images  CXR with mild interstitial prominence.    HemoDynamics:  Pulse: 72, Heart Rate (Monitored): 71  Blood Pressure : 101/68, NIBP: 126/57       Recent Labs      05/02/18   1720  05/02/18   2355  05/03/18   0520   CPKTOTAL  1089*  1025*   --    TROPONINI  0.14*  0.11*  0.07*   BNPBTYPENAT  70   --    --        Neuro:    Fluids:  Intake/Output       05/01/18 0700 - 05/02/18 0659 (Not Admitted) 05/02/18 0700 - 05/03/18 0659 05/03/18 0700 - 05/04/18 0659      1522-9323 6922-0198 Total 6652-6689 6655-1334 Total 3770-2873 5375-2959 Total       Intake    I.V.  --  -- --  --  1000 1000  --  -- --    IV Volume (NS) -- -- -- -- 500 500 -- -- --    IV Volume (NS 20 of K ) -- -- -- -- 500 500 -- -- --    Total Intake -- -- -- -- 1000 1000 -- -- --       Output    Urine  --  -- --  --  550 550  --  -- --    Urine Void (mL) (non-catheter) --  -- -- -- 100 100 -- -- --    Output (mL) (Urinary Catheter Indwelling Catheter 16 Egyptian) -- -- -- -- 450 450 -- -- --    Stool  --  -- --  --  -- --  --  -- --    Number of Times Stooled -- -- -- -- 0 x 0 x -- -- --    Total Output -- -- -- -- 550 550 -- -- --       Net I/O     -- -- -- -- 450 450 -- -- --        Weight: 52.3 kg (115 lb 4.8 oz)  Recent Labs      18   SODIUM  141  141  142   POTASSIUM  3.5*  3.4*  4.4   CHLORIDE  105  108  111   CO2  25  23  20   BUN  48*  50*  47*   CREATININE  1.42*  1.32  1.13   MAGNESIUM  2.0   --    --    CALCIUM  9.9  9.5  8.7       GI/Nutrition:    Liver Function  Recent Labs      18   05   ALTSGPT  34  31  31   ASTSGOT  50*  50*  48*   ALKPHOSPHAT  90  84  73   TBILIRUBIN  2.5*  2.4*  2.2*   LIPASE  <3*   --    --    GLUCOSE  190*  114*  119*       Heme:  Recent Labs      18   RBC  5.42*   HEMOGLOBIN  17.1*   HEMATOCRIT  48.8*   PLATELETCT  177   PROTHROMBTM  14.3   APTT  27.4   INR  1.14*       Infectious Disease:  Temp  Av.9 °C (98.5 °F)  Min: 36.6 °C (97.9 °F)  Max: 37.4 °C (99.3 °F)    Recent Labs      1820   WBC  15.7*   --    --    NEUTSPOLYS  82.70*   --    --    LYMPHOCYTES  6.70*   --    --    MONOCYTES  9.90   --    --    EOSINOPHILS  0.10   --    --    BASOPHILS  0.20   --    --    ASTSGOT  50*  50*  48*   ALTSGPT  34  31  31   ALKPHOSPHAT  90  84  73   TBILIRUBIN  2.5*  2.4*  2.2*     Current Facility-Administered Medications   Medication Dose Frequency Provider Last Rate Last Dose   • pneumococcal 13-Hanna Conj Vacc (PREVNAR 13) syringe 0.5 mL  0.5 mL Once PRN Glenn Schwarz D.O.       • senna-docusate (PERICOLACE or SENOKOT S) 8.6-50 MG per tablet 2 Tab  2 Tab BID Glenn Schwarz D.O.   Stopped at 18 2100    And   • polyethylene glycol/lytes (MIRALAX) PACKET 1 Packet  1 Packet QDAY PRN Glenn Schwarz,  WOODY        And   • magnesium hydroxide (MILK OF MAGNESIA) suspension 30 mL  30 mL QDAY PRN Glenn Schwarz D.O.        And   • bisacodyl (DULCOLAX) suppository 10 mg  10 mg QDAY PRN Glenn Schwarz D.O.       • Respiratory Care per Protocol   Continuous RT Glenn Schwarz D.O.       • 0.9 % NaCl with KCl 20 mEq infusion   Continuous SALU López.O. 100 mL/hr at 05/02/18 2337     • nicotine (NICODERM) 14 MG/24HR 14 mg  14 mg Daily-0600 MCKINLEY LópezO.   14 mg at 05/03/18 0525    And   • nicotine polacrilex (NICORETTE) 2 MG piece 2 mg  2 mg Q HOUR PRN Glenn Schwarz D.O.       • levETIRAcetam (KEPPRA) 100 MG/ML solution 500 mg  500 mg Q12HRS Danielle Johnson M.D.   Stopped at 05/02/18 2015   • [START ON 5/4/2018] vitamin D (cholecalciferol) tablet 2,000 Units  2,000 Units MO, WE + FR Danielle Johnson M.D.       • [START ON 5/4/2018] folic acid (FOLVITE) tablet 1 mg  1 mg MO, WE + FR Danielle Johnson M.D.       • latanoprost (XALATAN) 0.005 % ophthalmic solution 1 Drop  1 Drop QHS Danielle Johnson M.D.   1 Drop at 05/02/18 2349   • metoprolol (LOPRESSOR) tablet 25 mg  25 mg BID Danielle Johnson M.D.   Stopped at 05/02/18 2100   • prednisoLONE acetate (PRED FORTE) 1 % ophthalmic suspension 1 Drop  1 Drop 4X/DAY Danielle Johnson M.D.   1 Drop at 05/02/18 2350   • labetalol (NORMODYNE,TRANDATE) injection 10 mg  10 mg Q6HRS PRN Danielle Johnson M.D.       • brimonidine (ALPHAGAN) 0.2 % ophthalmic solution 1 Drop  1 Drop BID Danielle Johnson M.D.   1 Drop at 05/02/18 2350    And   • timolol (TIMOPTIC) 0.5 % ophthalmic solution 1 Drop  1 Drop BID Danielle Johnson M.D.   1 Drop at 05/02/18 2351     Last reviewed on 5/2/2018  5:01 PM by Lonnie Berry    Quality  Measures:  Medications reviewed, Labs reviewed and Radiology images reviewed  Arias catheter: Critically Ill - Requiring Accurate Measurement of Urinary Output      DVT Prophylaxis: Contraindicated - High bleeding  risk  DVT prophylaxis - mechanical: SCDs  Ulcer prophylaxis: Not indicated          Assessment and Plan:    Acute subdural hematoma   -Small, along left tentorium   -Stable on CT scan of 5/3 compared to 5/2   -Continue every 4 hours neuro checks   -No anticoagulation or antiplatelet medications   -Continue Keppra, 500 mg twice daily    Acute kidney injury   -Improving with intravenous hydration    Rhabdomyolysis   -This lady was found down   -Continue IV hydration    Hypertension   -Continue current dose of metoprolol    Elevated troponin   -Trending down   -Check echocardiogram      OK to transfer out of ICU.  [facility] will sign off on transfer.  Please call if you have any questions.    Discussed with RN, RT, team, clinical pharmacist, residents

## 2018-05-03 NOTE — CARE PLAN
Problem: Safety  Goal: Will remain free from falls    Intervention: Implement fall precautions  Risk for fall assessment completed, patients bed in low locked position, bed alarm on, patient educated to call for assistance, patients room close to the nurses station, appropriate mobility signs in place.       Problem: Skin Integrity  Goal: Risk for impaired skin integrity will decrease    Intervention: Assess and monitor skin integrity, appearance and/or temperature  Patient admitted with multiple DTI's pictures taken and uploaded to patient chart, wound consult has been placed.

## 2018-05-03 NOTE — ED PROVIDER NOTES
"ED Provider Note    CHIEF COMPLAINT  Chief Complaint   Patient presents with   • Groin Pain     right groin pain   • Shoulder Pain     left shoulder pain   • Elbow Pain     left elbow pain       HPI  Hilda Blake is a 77 y.o. female here for evaluation after a fall. The patient is here with her daughter, who states that the patient was last seen normal on Monday, and when her daughter-in-law went to visit her today, it was noted that she was on the floor. The patient is amnestic to the event, but does state that she \"probably tripped.\" She has no fever, no chest pain, no shortness of breath. She has no abdominal pain. She did have some left shoulder pain as well, but no neck pain. The patient is a poor historian, and confused with some questions. She denies any dysuria, urgency or frequency.    PAST MEDICAL HISTORY   has a past medical history of Breast cancer (HCC) (2004); Glaucoma - Dr. Lima (4/20/2010); Hemangioma of liver; Hemangioma of liver benign (8/19/2013); HTN (hypertension), benign; Hyperlipidemia; Hypertension; Osteopenia; Ptosis of eyelid; and Tobacco abuse.    SOCIAL HISTORY  Social History     Social History Main Topics   • Smoking status: Current Every Day Smoker     Packs/day: 0.50     Years: 61.00     Types: Cigarettes     Start date: 4/27/1956   • Smokeless tobacco: Never Used      Comment: cessation recommended   • Alcohol use 0.5 oz/week     1 Shots of liquor per week      Comment: 1-2 /week   • Drug use: No   • Sexual activity: Not on file       SURGICAL HISTORY   has a past surgical history that includes mastectomy bilateral subq; cholecystectomy; and mastectomy bilateral subq.    CURRENT MEDICATIONS  Home Medications     Reviewed by Lonnie Berry (Pharmacy Tech) on 05/02/18 at 1701  Med List Status: Complete   Medication Last Dose Status   amlodipine (NORVASC) 5 MG Tab 4/29/2018 Active   aspirin 81 MG EC tablet 4/29/2018 Active   benazepril (LOTENSIN) 20 MG Tab 4/29/2018 " Active   Brimonidine Tartrate-Timolol (COMBIGAN) 0.2-0.5 % Solution 4/29/2018 Active   Cholecalciferol (VITAMIN D) 2000 units Cap 4/27/2018 Active   folic acid (FOLVITE) 800 MCG tablet 04/27/2018 Active   ibuprofen (MOTRIN) 200 MG Tab 5/1/2018 Active   latanoprost (XALATAN) 0.005 % Solution 4/29/2018 Active   metoprolol (LOPRESSOR) 25 MG Tab 4/29/2018 Active   prednisoLONE acetate (PRED FORTE) 1 % Suspension 4/29/2018 Active   vitamin D (CHOLECALCIFEROL) 1000 UNIT TABS 5/1/2018 Active                ALLERGIES  Allergies   Allergen Reactions   • Tamoxifen Anaphylaxis     bleeding       REVIEW OF SYSTEMS  See HPI for further details. Review of systems as above, otherwise all other systems are negative.     PHYSICAL EXAM  Constitutional: Well developed, well nourished. Mild acute distress.  HEENT: atraumatic. Posterior pharynx clear and moist.  Eyes:  EOMI. Normal sclera.  Neck: Supple, Full range of motion, nontender midline.  Chest/Pulmonary: clear to ausculation. Symmetrical expansion.   Cardio: Regular rate and rhythm with no murmur.   Abdomen: Soft, nontender. No peritoneal signs. No guarding. No palpable masses.  Back: No CVA tenderness, nontender midline, no step offs.  Musculoskeletal: No deformity, no edema, neurovascular intact. Ecchymosis to the left shoulder, old ecchymosis to the right shoulder. Moves all extremities ×4.  Neuro: Clear speech, cooperative, cranial nerves II-XII grossly intact. Tremor to the right upper extremity.  Psych: Flat    Results for orders placed or performed during the hospital encounter of 05/02/18   CBC WITH DIFFERENTIAL   Result Value Ref Range    WBC 15.7 (H) 4.8 - 10.8 K/uL    RBC 5.42 (H) 4.20 - 5.40 M/uL    Hemoglobin 17.1 (H) 12.0 - 16.0 g/dL    Hematocrit 48.8 (H) 37.0 - 47.0 %    MCV 90.0 81.4 - 97.8 fL    MCH 31.5 27.0 - 33.0 pg    MCHC 35.0 33.6 - 35.0 g/dL    RDW 43.6 35.9 - 50.0 fL    Platelet Count 177 164 - 446 K/uL    MPV 11.8 9.0 - 12.9 fL    Neutrophils-Polys  82.70 (H) 44.00 - 72.00 %    Lymphocytes 6.70 (L) 22.00 - 41.00 %    Monocytes 9.90 0.00 - 13.40 %    Eosinophils 0.10 0.00 - 6.90 %    Basophils 0.20 0.00 - 1.80 %    Immature Granulocytes 0.40 0.00 - 0.90 %    Nucleated RBC 0.00 /100 WBC    Neutrophils (Absolute) 12.95 (H) 2.00 - 7.15 K/uL    Lymphs (Absolute) 1.05 1.00 - 4.80 K/uL    Monos (Absolute) 1.55 (H) 0.00 - 0.85 K/uL    Eos (Absolute) 0.01 0.00 - 0.51 K/uL    Baso (Absolute) 0.03 0.00 - 0.12 K/uL    Immature Granulocytes (abs) 0.07 0.00 - 0.11 K/uL    NRBC (Absolute) 0.00 K/uL   COMP METABOLIC PANEL   Result Value Ref Range    Sodium 141 135 - 145 mmol/L    Potassium 3.5 (L) 3.6 - 5.5 mmol/L    Chloride 105 96 - 112 mmol/L    Co2 25 20 - 33 mmol/L    Anion Gap 11.0 0.0 - 11.9    Glucose 190 (H) 65 - 99 mg/dL    Bun 48 (H) 8 - 22 mg/dL    Creatinine 1.42 (H) 0.50 - 1.40 mg/dL    Calcium 9.9 8.5 - 10.5 mg/dL    AST(SGOT) 50 (H) 12 - 45 U/L    ALT(SGPT) 34 2 - 50 U/L    Alkaline Phosphatase 90 30 - 99 U/L    Total Bilirubin 2.5 (H) 0.1 - 1.5 mg/dL    Albumin 3.8 3.2 - 4.9 g/dL    Total Protein 6.8 6.0 - 8.2 g/dL    Globulin 3.0 1.9 - 3.5 g/dL    A-G Ratio 1.3 g/dL   LIPASE   Result Value Ref Range    Lipase <3 (L) 11 - 82 U/L   PROTHROMBIN TIME   Result Value Ref Range    PT 14.3 12.0 - 14.6 sec    INR 1.14 (H) 0.87 - 1.13   APTT   Result Value Ref Range    APTT 27.4 24.7 - 36.0 sec   TROPONIN   Result Value Ref Range    Troponin I 0.14 (H) 0.00 - 0.04 ng/mL   BTYPE NATRIURETIC PEPTIDE   Result Value Ref Range    B Natriuretic Peptide 70 0 - 100 pg/mL   CREATINE KINASE   Result Value Ref Range    CPK Total 1089 (H) 0 - 154 U/L   ESTIMATED GFR   Result Value Ref Range    GFR If  43 (A) >60 mL/min/1.73 m 2    GFR If Non  36 (A) >60 mL/min/1.73 m 2     DX-SHOULDER 1 VIEW LEFT   Final Result      No evidence of left shoulder dislocation.      DX-SHOULDER 2+ LEFT   Final Result      The humeral head is projected superiorly and  posteriorly and the Y-view. This can be positional in nature or secondary to the posterior dislocation. Axillary views recommended for further evaluation.      DX-CHEST-PORTABLE (1 VIEW)   Final Result      Bilateral interstitial prominence. No focal consolidation.      CT-HEAD W/O   Final Result      Left tentorium small acute subdural hematoma.      Nonspecific white matter changes and cerebral volume loss.      Findings were discussed with CHESTER THORNE on 5/2/2018 4:53 PM.        6:42 PM  I spoke to APURVA her, who will admit the pt to the unit.      I also spoke to Dr. Easley, on for neurosurgery.  He will consult [facility].       7:15 PM  The pt is able to touch her right shoulder, with her left hand, without difficulty.     The pt will be admitted to the ICU for her medical issues, and is currently, back at baseline per the daughter.  She is answering questions, but remains confused.      PROCEDURES     MEDICAL RECORD  I have reviewed patient's medical record and pertinent results are listed above.    COURSE & MEDICAL DECISION MAKING  I have reviewed any medical record information, laboratory studies and radiographic results as noted above.    FINAL IMPRESSION    1. Subdural hemorrhage (HCC)    2. Closed head injury, initial encounter    3. Traumatic rhabdomyolysis, initial encounter (HCC)        Electronically signed by: Chester Thorne, 5/2/2018 6:10 PM

## 2018-05-03 NOTE — H&P
Internal Medicine Admitting History and Physical    Note Author: Danielle Johnson M.D.       Name Hilda Blake 1940   Age/Sex 77 y.o. female   MRN [id number]   Code Status full     After 5PM or if no immediate response to page, please call for cross-coverage  Attending/Team: Rufino/Karishma See Patient List for primary contact information  Call [phone] to page    1st Call - Day Resident:   Dr. Boss 2nd Call - Day Sr. Resident (R2/R3):   Dr. Bryan       Chief Complaint: ground level fall with subdural hematoma, MARISELA    HPI:  Patient is a pleasant 77-year-old lady who is brought in by her family for a ground-level fall that happened this morning. She states she was in her kitchen walking around and felt that she began to fall backwards - she hit the back of her head. She denies tripping on anything, dizziness, headaches, vision changes or vertigo. She feels that she has been weaker than normal recently. She also endorses not being able to get out from under her neighbor's bed 2 days ago. She states she was under that bed for approximately 12 hours. She is confused about details of these events and has not a good historian. She has a has not taken her meds for 1-2 days.     Past medical history: Hypertension on benazepril and amlodipine, tobacco use .5 ppd/60yrs, hyperlipidemia, osteopenia, breast cancer with bilateral mastectomy and radiation in .       Review of Systems   Constitutional: Negative for fever and malaise/fatigue.   HENT: Negative for ear discharge, ear pain, hearing loss and sore throat.    Eyes: Negative for blurred vision, double vision, pain and discharge.   Respiratory: Negative for cough and shortness of breath.    Cardiovascular: Negative for chest pain, palpitations and leg swelling.   Gastrointestinal: Negative for abdominal pain and nausea.   Genitourinary: Negative.    Musculoskeletal: Positive for falls and myalgias.   Skin:        Multiple bruises   Neurological:  Positive for weakness and headaches. Negative for dizziness, sensory change, focal weakness and loss of consciousness.   Psychiatric/Behavioral: Negative for depression and substance abuse. The patient is not nervous/anxious.              Past Medical History:   Past Medical History:   Diagnosis Date   • Breast cancer (HCC) 2004    treated with radiation and resection   • Glaucoma - Dr. Lima 4/20/2010   • Hemangioma of liver    • Hemangioma of liver benign 8/19/2013   • HTN (hypertension), benign    • Hyperlipidemia    • Hypertension    • Osteopenia    • Ptosis of eyelid     right   • Tobacco abuse     chronic     Past Surgical History:  Past Surgical History:   Procedure Laterality Date   • CHOLECYSTECTOMY     • MASTECTOMY BILATERAL SUBQ     • MASTECTOMY BILATERAL SUBQ       Current Outpatient Medications:  Home Medications     Reviewed by Lonnie Berry (Pharmacy Tech) on 05/02/18 at 1701  Med List Status: Complete   Medication Last Dose Status   amlodipine (NORVASC) 5 MG Tab 4/29/2018 Active   aspirin 81 MG EC tablet 4/29/2018 Active   benazepril (LOTENSIN) 20 MG Tab 4/29/2018 Active   Brimonidine Tartrate-Timolol (COMBIGAN) 0.2-0.5 % Solution 4/29/2018 Active   Cholecalciferol (VITAMIN D) 2000 units Cap 4/27/2018 Active   folic acid (FOLVITE) 800 MCG tablet 04/27/2018 Active   ibuprofen (MOTRIN) 200 MG Tab 5/1/2018 Active   latanoprost (XALATAN) 0.005 % Solution 4/29/2018 Active   metoprolol (LOPRESSOR) 25 MG Tab 4/29/2018 Active   prednisoLONE acetate (PRED FORTE) 1 % Suspension 4/29/2018 Active   vitamin D (CHOLECALCIFEROL) 1000 UNIT TABS 5/1/2018 Active              Medication Allergy/Sensitivities:  Allergies   Allergen Reactions   • Tamoxifen Anaphylaxis     bleeding     Family History:  Family History   Problem Relation Age of Onset   • Autoimmune Disease Son      Sarcoidosis   • Cancer Son 48     colon    • No Known Problems Son    • No Known Problems Son    • No Known Problems Daughter    •  "Diabetes Neg Hx    • Hypertension Neg Hx    • Heart Disease Neg Hx      Social History:  Social History     Social History   • Marital status:      Spouse name: N/A   • Number of children: N/A   • Years of education: N/A     Occupational History   • Not on file.     Social History Main Topics   • Smoking status: Current Every Day Smoker     Packs/day: 0.50     Years: 61.00     Types: Cigarettes     Start date: 4/27/1956   • Smokeless tobacco: Never Used      Comment: cessation recommended   • Alcohol use 0.5 oz/week     1 Shots of liquor per week      Comment: 1-2 /week   • Drug use: No   • Sexual activity: Not on file     Other Topics Concern   • Not on file     Social History Narrative   • No narrative on file     Living situation: Lives alone  PCP : PENNIE Avila    Physical Exam     Vitals:    05/02/18 1653 05/02/18 1722 05/02/18 1815 05/02/18 1845   BP:       Pulse: 94 100 98 100   Resp:  13 14 (!) 11   Temp:       SpO2: 93% 94% 93% 94%   Weight:       Height:         Body mass index is 20.09 kg/m².  /68   Pulse 100   Temp 36.6 °C (97.9 °F)   Resp (!) 11   Ht 1.626 m (5' 4\")   Wt 53.1 kg (117 lb 1 oz)   SpO2 94%   BMI 20.09 kg/m²   O2 therapy: Pulse Oximetry: 94 %, O2 Delivery: None (Room Air)    Physical Exam   Constitutional: She is oriented to person, place, and time. No distress.   thin   HENT:   Dry oral mucosa   Eyes: EOM are normal. No scleral icterus.   Neck: Neck supple.   Cardiovascular: Normal rate and regular rhythm.    No murmur heard.  Pulmonary/Chest: Breath sounds normal. No respiratory distress.   Abdominal: Soft. She exhibits no distension. There is no tenderness. There is no rebound.   Musculoskeletal: Normal range of motion. She exhibits tenderness. She exhibits no edema or deformity.   Left shoulder exam with normal range of motion, mild tenderness   Neurological: She is alert and oriented to person, place, and time. She displays normal reflexes. No cranial nerve " deficit. She exhibits normal muscle tone. Coordination normal.   Mild right-sided neglect. Difficulty with finger to nose. Left upper extremity strength 4/5, right upper extremity 5/5.   Skin: Skin is warm. She is not diaphoretic.   Acute ecchymosis over her right hip/arm, and left shoulder. She has an old healing ecchymosis below left knee.    Psychiatric:   Difficulty remembering details. Tangential thinking.     Lab Data Review:  Recent Results (from the past 24 hour(s))   CBC WITH DIFFERENTIAL    Collection Time: 05/02/18  5:20 PM   Result Value Ref Range    WBC 15.7 (H) 4.8 - 10.8 K/uL    RBC 5.42 (H) 4.20 - 5.40 M/uL    Hemoglobin 17.1 (H) 12.0 - 16.0 g/dL    Hematocrit 48.8 (H) 37.0 - 47.0 %    MCV 90.0 81.4 - 97.8 fL    MCH 31.5 27.0 - 33.0 pg    MCHC 35.0 33.6 - 35.0 g/dL    RDW 43.6 35.9 - 50.0 fL    Platelet Count 177 164 - 446 K/uL    MPV 11.8 9.0 - 12.9 fL    Neutrophils-Polys 82.70 (H) 44.00 - 72.00 %    Lymphocytes 6.70 (L) 22.00 - 41.00 %    Monocytes 9.90 0.00 - 13.40 %    Eosinophils 0.10 0.00 - 6.90 %    Basophils 0.20 0.00 - 1.80 %    Immature Granulocytes 0.40 0.00 - 0.90 %    Nucleated RBC 0.00 /100 WBC    Neutrophils (Absolute) 12.95 (H) 2.00 - 7.15 K/uL    Lymphs (Absolute) 1.05 1.00 - 4.80 K/uL    Monos (Absolute) 1.55 (H) 0.00 - 0.85 K/uL    Eos (Absolute) 0.01 0.00 - 0.51 K/uL    Baso (Absolute) 0.03 0.00 - 0.12 K/uL    Immature Granulocytes (abs) 0.07 0.00 - 0.11 K/uL    NRBC (Absolute) 0.00 K/uL   COMP METABOLIC PANEL    Collection Time: 05/02/18  5:20 PM   Result Value Ref Range    Sodium 141 135 - 145 mmol/L    Potassium 3.5 (L) 3.6 - 5.5 mmol/L    Chloride 105 96 - 112 mmol/L    Co2 25 20 - 33 mmol/L    Anion Gap 11.0 0.0 - 11.9    Glucose 190 (H) 65 - 99 mg/dL    Bun 48 (H) 8 - 22 mg/dL    Creatinine 1.42 (H) 0.50 - 1.40 mg/dL    Calcium 9.9 8.5 - 10.5 mg/dL    AST(SGOT) 50 (H) 12 - 45 U/L    ALT(SGPT) 34 2 - 50 U/L    Alkaline Phosphatase 90 30 - 99 U/L    Total Bilirubin 2.5 (H)  0.1 - 1.5 mg/dL    Albumin 3.8 3.2 - 4.9 g/dL    Total Protein 6.8 6.0 - 8.2 g/dL    Globulin 3.0 1.9 - 3.5 g/dL    A-G Ratio 1.3 g/dL   LIPASE    Collection Time: 05/02/18  5:20 PM   Result Value Ref Range    Lipase <3 (L) 11 - 82 U/L   PROTHROMBIN TIME    Collection Time: 05/02/18  5:20 PM   Result Value Ref Range    PT 14.3 12.0 - 14.6 sec    INR 1.14 (H) 0.87 - 1.13   APTT    Collection Time: 05/02/18  5:20 PM   Result Value Ref Range    APTT 27.4 24.7 - 36.0 sec   TROPONIN    Collection Time: 05/02/18  5:20 PM   Result Value Ref Range    Troponin I 0.14 (H) 0.00 - 0.04 ng/mL   BTYPE NATRIURETIC PEPTIDE    Collection Time: 05/02/18  5:20 PM   Result Value Ref Range    B Natriuretic Peptide 70 0 - 100 pg/mL   CREATINE KINASE    Collection Time: 05/02/18  5:20 PM   Result Value Ref Range    CPK Total 1089 (H) 0 - 154 U/L   ESTIMATED GFR    Collection Time: 05/02/18  5:20 PM   Result Value Ref Range    GFR If  43 (A) >60 mL/min/1.73 m 2    GFR If Non  36 (A) >60 mL/min/1.73 m 2     Imaging/Procedures Review:      DX-SHOULDER 2+ LEFT   Final Result      The humeral head is projected superiorly and posteriorly and the Y-view. This can be positional in nature or secondary to the posterior dislocation. Axillary views recommended for further evaluation.      DX-CHEST-PORTABLE (1 VIEW)   Final Result      Bilateral interstitial prominence. No focal consolidation.      CT-HEAD W/O   Final Result      Left tentorium small acute subdural hematoma.      Nonspecific white matter changes and cerebral volume loss.      Findings were discussed with ARJUN THORNE on 5/2/2018 4:53 PM.      DX-SHOULDER 1 VIEW LEFT    (Results Pending)   EKG:   EKG Independant Review: Completed  QTc:455, HR: 70s, Normal Sinus Rhythm, no ST/T changes. Poor R wave progression.          Assessment/Plan     #Subdural hematoma  -Small left tentorium  -Ground-level fall 2 days ago  -Intermittently confused  -On exam:  Right-sided dysmetria, bilateral resting tremors, left upper extremity strength 4/5.  -Neurosurgery following - will repeat CT in the morning to evaluate for hematoma enlargement  -Frequent neuro checks  -BP goal <140/90  -labetalol PRN  -Hold aspirin    #Elevated CPK  #MARISELA  -CPK 1089  -Cr baseline 0.8>> now 1.4  -Given 500 mL normal saline in the ED  -BP stable, Tachycardic 90-100s, not requiring oxygen - EKG SR  -Denies CP, SOB, palpitaions  -NSS + KCL 20meq at 100ml/hr  -Pending urinalysis    #Ground Level Fall  -PT/OT    #Dysmetria - right sided neglect  #Right upper ext weakness   -carotid doppler  -ECHO    #Hypokalemia  -K 3.5  -Replete and monitor    #Elevated troponin  -Trop 0.14  -No CP  -likely related to muscle injury, MARISELA  -trend serial trops and EKG    #Hypertension   -Has not been taking meds for 1 to 2 days and BP is in the 110s/50s  -Hold amlodipine and benazepril for now    #Tobacco use - half pack a day for 60 years - no desire to quit - nicotine patch  #Osteopenia - on calcium and vitamin D       Quality-Core Measures

## 2018-05-03 NOTE — THERAPY
"PT orders received, eval attempted. Pt refused OOB mobility at this time, reported she \"just got comfortable.\" Attempted to encourage pt and pt continued to decline. Will reattempt PT eval tomorrow as able. Encouraged pt to mobilize with nursing to chair for lunch, pt agreeable.     Abby Philip, PT, DPT Pager: [phone]  "

## 2018-05-04 LAB
ALBUMIN SERPL BCP-MCNC: 3.2 G/DL (ref 3.2–4.9)
ALBUMIN/GLOB SERPL: 1.3 G/DL
ALP SERPL-CCNC: 60 U/L (ref 30–99)
ALT SERPL-CCNC: 23 U/L (ref 2–50)
ANION GAP SERPL CALC-SCNC: 7 MMOL/L (ref 0–11.9)
AST SERPL-CCNC: 29 U/L (ref 12–45)
BASOPHILS # BLD AUTO: 0.4 % (ref 0–1.8)
BASOPHILS # BLD: 0.05 K/UL (ref 0–0.12)
BILIRUB SERPL-MCNC: 1.4 MG/DL (ref 0.1–1.5)
BUN SERPL-MCNC: 28 MG/DL (ref 8–22)
CALCIUM SERPL-MCNC: 8.3 MG/DL (ref 8.5–10.5)
CHLORIDE SERPL-SCNC: 111 MMOL/L (ref 96–112)
CO2 SERPL-SCNC: 23 MMOL/L (ref 20–33)
CREAT SERPL-MCNC: 0.71 MG/DL (ref 0.5–1.4)
EOSINOPHIL # BLD AUTO: 0.14 K/UL (ref 0–0.51)
EOSINOPHIL NFR BLD: 1.2 % (ref 0–6.9)
ERYTHROCYTE [DISTWIDTH] IN BLOOD BY AUTOMATED COUNT: 48.6 FL (ref 35.9–50)
GLOBULIN SER CALC-MCNC: 2.5 G/DL (ref 1.9–3.5)
GLUCOSE SERPL-MCNC: 104 MG/DL (ref 65–99)
HCT VFR BLD AUTO: 48.5 % (ref 37–47)
HGB BLD-MCNC: 16 G/DL (ref 12–16)
IMM GRANULOCYTES # BLD AUTO: 0.06 K/UL (ref 0–0.11)
IMM GRANULOCYTES NFR BLD AUTO: 0.5 % (ref 0–0.9)
LYMPHOCYTES # BLD AUTO: 1.45 K/UL (ref 1–4.8)
LYMPHOCYTES NFR BLD: 12.3 % (ref 22–41)
MAGNESIUM SERPL-MCNC: 1.7 MG/DL (ref 1.5–2.5)
MCH RBC QN AUTO: 31.8 PG (ref 27–33)
MCHC RBC AUTO-ENTMCNC: 33 G/DL (ref 33.6–35)
MCV RBC AUTO: 96.4 FL (ref 81.4–97.8)
MONOCYTES # BLD AUTO: 1.46 K/UL (ref 0–0.85)
MONOCYTES NFR BLD AUTO: 12.4 % (ref 0–13.4)
NEUTROPHILS # BLD AUTO: 8.59 K/UL (ref 2–7.15)
NEUTROPHILS NFR BLD: 73.2 % (ref 44–72)
NRBC # BLD AUTO: 0 K/UL
NRBC BLD-RTO: 0 /100 WBC
PLATELET # BLD AUTO: 128 K/UL (ref 164–446)
PMV BLD AUTO: 11.7 FL (ref 9–12.9)
POTASSIUM SERPL-SCNC: 4 MMOL/L (ref 3.6–5.5)
PROT SERPL-MCNC: 5.7 G/DL (ref 6–8.2)
RBC # BLD AUTO: 5.03 M/UL (ref 4.2–5.4)
SODIUM SERPL-SCNC: 141 MMOL/L (ref 135–145)
WBC # BLD AUTO: 11.8 K/UL (ref 4.8–10.8)

## 2018-05-04 PROCEDURE — 80053 COMPREHEN METABOLIC PANEL: CPT

## 2018-05-04 PROCEDURE — G8978 MOBILITY CURRENT STATUS: HCPCS | Mod: CN

## 2018-05-04 PROCEDURE — 97165 OT EVAL LOW COMPLEX 30 MIN: CPT

## 2018-05-04 PROCEDURE — 97162 PT EVAL MOD COMPLEX 30 MIN: CPT

## 2018-05-04 PROCEDURE — 700102 HCHG RX REV CODE 250 W/ 637 OVERRIDE(OP): Performed by: STUDENT IN AN ORGANIZED HEALTH CARE EDUCATION/TRAINING PROGRAM

## 2018-05-04 PROCEDURE — 770001 HCHG ROOM/CARE - MED/SURG/GYN PRIV*

## 2018-05-04 PROCEDURE — G8987 SELF CARE CURRENT STATUS: HCPCS | Mod: CM

## 2018-05-04 PROCEDURE — G8988 SELF CARE GOAL STATUS: HCPCS | Mod: CI

## 2018-05-04 PROCEDURE — A9270 NON-COVERED ITEM OR SERVICE: HCPCS | Performed by: INTERNAL MEDICINE

## 2018-05-04 PROCEDURE — 85025 COMPLETE CBC W/AUTO DIFF WBC: CPT

## 2018-05-04 PROCEDURE — 700111 HCHG RX REV CODE 636 W/ 250 OVERRIDE (IP): Performed by: STUDENT IN AN ORGANIZED HEALTH CARE EDUCATION/TRAINING PROGRAM

## 2018-05-04 PROCEDURE — 700102 HCHG RX REV CODE 250 W/ 637 OVERRIDE(OP): Performed by: INTERNAL MEDICINE

## 2018-05-04 PROCEDURE — G8979 MOBILITY GOAL STATUS: HCPCS | Mod: CL

## 2018-05-04 PROCEDURE — 700105 HCHG RX REV CODE 258: Performed by: INTERNAL MEDICINE

## 2018-05-04 PROCEDURE — A9270 NON-COVERED ITEM OR SERVICE: HCPCS | Performed by: STUDENT IN AN ORGANIZED HEALTH CARE EDUCATION/TRAINING PROGRAM

## 2018-05-04 PROCEDURE — 83735 ASSAY OF MAGNESIUM: CPT

## 2018-05-04 RX ORDER — MAGNESIUM SULFATE HEPTAHYDRATE 40 MG/ML
2 INJECTION, SOLUTION INTRAVENOUS ONCE
Status: COMPLETED | OUTPATIENT
Start: 2018-05-04 | End: 2018-05-04

## 2018-05-04 RX ORDER — ACETAMINOPHEN 500 MG
1000 TABLET ORAL EVERY 6 HOURS PRN
Status: DISCONTINUED | OUTPATIENT
Start: 2018-05-04 | End: 2018-05-07 | Stop reason: HOSPADM

## 2018-05-04 RX ADMIN — PREDNISOLONE ACETATE 1 DROP: 10 SUSPENSION/ DROPS OPHTHALMIC at 13:37

## 2018-05-04 RX ADMIN — ACETAMINOPHEN 1000 MG: 500 TABLET ORAL at 19:24

## 2018-05-04 RX ADMIN — FOLIC ACID 1 MG: 1 TABLET ORAL at 08:01

## 2018-05-04 RX ADMIN — TIMOLOL MALEATE 1 DROP: 5 SOLUTION OPHTHALMIC at 08:02

## 2018-05-04 RX ADMIN — SODIUM CHLORIDE: 9 INJECTION, SOLUTION INTRAVENOUS at 20:46

## 2018-05-04 RX ADMIN — ACETAMINOPHEN 1000 MG: 500 TABLET ORAL at 08:02

## 2018-05-04 RX ADMIN — LATANOPROST 1 DROP: 50 SOLUTION OPHTHALMIC at 20:24

## 2018-05-04 RX ADMIN — LEVETIRACETAM 500 MG: 100 SOLUTION ORAL at 20:24

## 2018-05-04 RX ADMIN — BRIMONIDINE TARTRATE 1 DROP: 2 SOLUTION OPHTHALMIC at 08:03

## 2018-05-04 RX ADMIN — PREDNISOLONE ACETATE 1 DROP: 10 SUSPENSION/ DROPS OPHTHALMIC at 08:03

## 2018-05-04 RX ADMIN — TIMOLOL MALEATE 1 DROP: 5 SOLUTION OPHTHALMIC at 21:00

## 2018-05-04 RX ADMIN — PREDNISOLONE ACETATE 1 DROP: 10 SUSPENSION/ DROPS OPHTHALMIC at 18:07

## 2018-05-04 RX ADMIN — BRIMONIDINE TARTRATE 1 DROP: 2 SOLUTION OPHTHALMIC at 21:00

## 2018-05-04 RX ADMIN — PREDNISOLONE ACETATE 1 DROP: 10 SUSPENSION/ DROPS OPHTHALMIC at 21:00

## 2018-05-04 RX ADMIN — MAGNESIUM SULFATE IN WATER 2 G: 40 INJECTION, SOLUTION INTRAVENOUS at 08:03

## 2018-05-04 RX ADMIN — SODIUM CHLORIDE: 9 INJECTION, SOLUTION INTRAVENOUS at 05:43

## 2018-05-04 RX ADMIN — METOPROLOL TARTRATE 25 MG: 25 TABLET, FILM COATED ORAL at 20:23

## 2018-05-04 RX ADMIN — LEVETIRACETAM 500 MG: 100 SOLUTION ORAL at 08:02

## 2018-05-04 RX ADMIN — VITAMIN D, TAB 1000IU (100/BT) 2000 UNITS: 25 TAB at 08:02

## 2018-05-04 RX ADMIN — NICOTINE 14 MG: 14 PATCH, EXTENDED RELEASE TRANSDERMAL at 05:43

## 2018-05-04 RX ADMIN — METOPROLOL TARTRATE 25 MG: 25 TABLET, FILM COATED ORAL at 08:02

## 2018-05-04 ASSESSMENT — ENCOUNTER SYMPTOMS
BRUISES/BLEEDS EASILY: 0
DIZZINESS: 0
DIAPHORESIS: 0
PALPITATIONS: 0
DIARRHEA: 0
STRIDOR: 0
FEVER: 0
NECK PAIN: 0
HEADACHES: 1
RESPIRATORY NEGATIVE: 1
TINGLING: 0
EYE PAIN: 0
DOUBLE VISION: 0
HALLUCINATIONS: 0
SPUTUM PRODUCTION: 0
SENSORY CHANGE: 0
ABDOMINAL PAIN: 0
CHILLS: 0
DEPRESSION: 0
ORTHOPNEA: 0
EYES NEGATIVE: 1
BACK PAIN: 0
SORE THROAT: 0
TREMORS: 0
GASTROINTESTINAL NEGATIVE: 1
COUGH: 0
HEMOPTYSIS: 0
FOCAL WEAKNESS: 0
CARDIOVASCULAR NEGATIVE: 1
FALLS: 1
NAUSEA: 0
VOMITING: 0
SHORTNESS OF BREATH: 0
POLYDIPSIA: 0
SEIZURES: 0
MYALGIAS: 0
SINUS PAIN: 0
WEAKNESS: 1
HEADACHES: 0
CONSTIPATION: 0
PHOTOPHOBIA: 0
SPEECH CHANGE: 0
BLURRED VISION: 0
NERVOUS/ANXIOUS: 0

## 2018-05-04 ASSESSMENT — COGNITIVE AND FUNCTIONAL STATUS - GENERAL
MOBILITY SCORE: 6
WALKING IN HOSPITAL ROOM: TOTAL
EATING MEALS: A LITTLE
MOVING TO AND FROM BED TO CHAIR: UNABLE
SUGGESTED CMS G CODE MODIFIER MOBILITY: CN
HELP NEEDED FOR BATHING: A LOT
DAILY ACTIVITIY SCORE: 14
DRESSING REGULAR UPPER BODY CLOTHING: A LOT
PERSONAL GROOMING: A LITTLE
TOILETING: A LOT
CLIMB 3 TO 5 STEPS WITH RAILING: TOTAL
SUGGESTED CMS G CODE MODIFIER DAILY ACTIVITY: CK
MOVING FROM LYING ON BACK TO SITTING ON SIDE OF FLAT BED: UNABLE
STANDING UP FROM CHAIR USING ARMS: TOTAL
TURNING FROM BACK TO SIDE WHILE IN FLAT BAD: UNABLE
DRESSING REGULAR LOWER BODY CLOTHING: A LOT

## 2018-05-04 ASSESSMENT — PAIN SCALES - GENERAL
PAINLEVEL_OUTOF10: 3
PAINLEVEL_OUTOF10: 0
PAINLEVEL_OUTOF10: 2
PAINLEVEL_OUTOF10: 6
PAINLEVEL_OUTOF10: 1
PAINLEVEL_OUTOF10: 3
PAINLEVEL_OUTOF10: 2

## 2018-05-04 ASSESSMENT — LIFESTYLE VARIABLES: SUBSTANCE_ABUSE: 0

## 2018-05-04 ASSESSMENT — PATIENT HEALTH QUESTIONNAIRE - PHQ9
SUM OF ALL RESPONSES TO PHQ9 QUESTIONS 1 AND 2: 0
1. LITTLE INTEREST OR PLEASURE IN DOING THINGS: NOT AT ALL
2. FEELING DOWN, DEPRESSED, IRRITABLE, OR HOPELESS: NOT AT ALL

## 2018-05-04 ASSESSMENT — ACTIVITIES OF DAILY LIVING (ADL): TOILETING: INDEPENDENT

## 2018-05-04 ASSESSMENT — GAIT ASSESSMENTS: GAIT LEVEL OF ASSIST: UNABLE TO PARTICIPATE

## 2018-05-04 NOTE — CARE PLAN
Problem: Venous Thromboembolism (VTW)/Deep Vein Thrombosis (DVT) Prevention:  Goal: Patient will participate in Venous Thrombosis (VTE)/Deep Vein Thrombosis (DVT)Prevention Measures  Outcome: PROGRESSING AS EXPECTED  SCDs in place. Patient not candidate for pharmacological VTE prophylaxis, contraindicated per MD.     Problem: Urinary Elimination:  Goal: Ability to reestablish a normal urinary elimination pattern will improve  Outcome: PROGRESSING AS EXPECTED  Patient has mustafa removed. Patient voiding, incontinent per baseline. Patient has adequate UOP.     Problem: Urinary:  Goal: Ability to reestablish a normal urinary elimination pattern will improve  Outcome: PROGRESSING AS EXPECTED  Patient has mustafa removed. Patient voiding, incontinent per baseline. Patient has adequate UOP.

## 2018-05-04 NOTE — THERAPY
"Pt is a 76 y/o female admitted s/p GLF with subsequent small L SDH. Pt very lethargic and appears confused during PT evaluation. Required Max - Total A of 1-2 people for all functional mobility this AM. Level of assist may be due to poor effort on behalf of pt. Pt will benefit from acute PT interventions to address present impairments.     Physical Therapy Evaluation completed.   Bed Mobility:  Supine to Sit: Total Assist X 2  Transfers: Sit to Stand: Maximal Assist  Gait: Level Of Assist: Unable to Participate       Plan of Care: Will benefit from Physical Therapy 3 times per week  Discharge Recommendations: Equipment: Will Continue to Assess for Equipment Needs.   Post-acute therapy:  D/C disposition dependent on confirmation of home environment and PLOF by family and progress made in acute setting.      See \"Rehab Therapy-Acute\" Patient Summary Report for complete documentation.     "

## 2018-05-04 NOTE — CARE PLAN
Problem: Safety  Goal: Will remain free from injury  Outcome: PROGRESSING AS EXPECTED  Bed locked and in lowest position. Patient calling appropriately. Personal belongings and call light within reach. PT and OT ordered for the patient and going to mobilize patient should patient agree. Fall precautions in place.     Problem: Bowel/Gastric:  Goal: Normal bowel function is maintained or improved  Outcome: PROGRESSING AS EXPECTED  Patient's last bowel movement prior to arrival.  Refused stool softeners this AM.    Problem: Urinary Elimination:  Goal: Ability to reestablish a normal urinary elimination pattern will improve  Outcome: PROGRESSING SLOWER THAN EXPECTED  Patient was incontinent twice during the night. Dry flows underneath patient as an extra precaution to help with moisture.  Instructed to patient to call if she should feel like she needs to go.  Call light within reach.     Problem: Urinary:  Goal: Ability to reestablish a normal urinary elimination pattern will improve  Outcome: PROGRESSING SLOWER THAN EXPECTED  Patient was incontinent twice during the night. Dry flows underneath patient as an extra precaution to help with moisture.  Instructed to patient to call if she should feel like she needs to go.  Call light within reach.

## 2018-05-04 NOTE — THERAPY
"Occupational Therapy Evaluation completed.   Functional Status:  Max A with ADLs and txfs  Plan of Care: Will benefit from Occupational Therapy 3 times per week  Discharge Recommendations:  Equipment: Will Continue to Assess for Equipment Needs. Post-acute therapy Discharge to a transitional care facility for continued therapy services.    See \"Rehab Therapy-Acute\" Patient Summary Report for complete documentation.    "

## 2018-05-04 NOTE — PROGRESS NOTES
"[facility] ICU Progress Note      Admit Date: 5/2/2018  ICU Day: 1    Resident(s): Bharti Boss  Attending: APURVA GORDON/ Dr. Kuhn      HPI:  76 y/o f with a pmh of HTN, DLD, osteopenia, breast CA (s/p mastectomy and radiation 2004), tobacco use brought to the ED by her family after she was found down. She was last seen at her baseline 4/30/18. States she was looking for her phone in a room filled with many objects, she tripped and fell and remembers hitting her head. States her legs \"gave out and didn't seem to want to work\" afterwards and wasn't able to stand for about 12 hours. She never lost consciousness but state although she remembered what happened her overall memory of the event is a little off. Both lower extremities bilaterally, no side was weaker than there other. No bleeding from the head. She had L shoulder pain, Xray in the ED shows no dislocation. She has ecchymosis on her R hip, weak but able to move all extremities. She was reported to have dysarthria on admission.  Labs on admission showed mild rhabdo and MARISELA. Started on IV fluids.   CT head showed small subdural hematoma, neurosurgery consutled and she was admitted to ICU.      Consultants:  Neurosurgery   PMA: Peyman Alston    Interval Events:  Pending transfer to neurosurg floor.  No neurological deficits. Chronic R eye ptosis  Afebrile, hemodynamically stable  MARISELA and Rhabdo improving per labs  Still weak, PT/OT following    Review of Systems   Constitutional: Negative for chills and fever.   HENT: Negative.  Negative for sore throat.    Eyes: Negative.    Respiratory: Negative.    Cardiovascular: Negative.    Gastrointestinal: Negative.    Genitourinary: Negative.    Musculoskeletal:        L shoulder pain   Skin: Negative.  Negative for rash.   Neurological: Positive for weakness. Negative for dizziness, tingling, tremors, sensory change, speech change, focal weakness and headaches.        Generalized weakness   Endo/Heme/Allergies: " "Negative.        PHYSICAL EXAM  Vitals:    05/04/18 0800 05/04/18 0900 05/04/18 1000 05/04/18 1100   BP:       Pulse: (!) 57 (!) 53 (!) 59 67   Resp: 18 17 15 20   Temp: 37 °C (98.6 °F)      SpO2: 98% 98% 95% 93%   Weight:       Height:         Body mass index is 19.79 kg/m².  /68   Pulse 67   Temp 37 °C (98.6 °F)   Resp 20   Ht 1.626 m (5' 4\")   Wt 52.3 kg (115 lb 4.8 oz)   SpO2 93%   BMI 19.79 kg/m²    O2 therapy: Pulse Oximetry: 93 %, O2 (LPM): 4, O2 Delivery: Nasal Cannula    Physical Exam   Constitutional: She is well-developed, well-nourished, and in no distress. No distress.   HENT:   Head: Normocephalic and atraumatic.   Mouth/Throat: Oropharynx is clear and moist. No oropharyngeal exudate.   Eyes: Conjunctivae and EOM are normal. Pupils are equal, round, and reactive to light. No scleral icterus.   Neck: Normal range of motion. Neck supple.   Cardiovascular: Normal rate and normal heart sounds.    No murmur heard.  Pulmonary/Chest: Effort normal and breath sounds normal. No respiratory distress. She has no wheezes. She has no rales. She exhibits no tenderness.   Abdominal: Soft. Bowel sounds are normal. She exhibits no distension. There is no tenderness. There is no rebound and no guarding.   Neurological: She is alert. No cranial nerve deficit.   A&Ox2, to person and place not to time. 5/5 strength in upper extremities bilaterally. 4/5 strength in lower extremities bilaterally. Resting tremor L>R upper extremity   Skin: Skin is warm and dry. She is not diaphoretic.   ecchymosis over R hip        Respiratory:     Respiration: 20, Pulse Oximetry: 93 %    Chest Tube Drains:          Invalid input(s): [id number]    HemoDynamics:  Pulse: 67, Heart Rate (Monitored): 67 NIBP: 121/59      Neuro:      Fluids:    Date 05/04/18 0700 - 05/05/18 0659   Shift 3090-0394 5687-1623 4861-6219 24 Hour Total   I  N  T  A  K  E   I.V. 465   465      Magnesium Sulfate Volume 50   50      IV Volume (NS) 415   " 415    Other 120   120      Medications (P.O./ Enteral Liquids) 120   120    Shift Total 585   585   O  U  T  P  U  T   Stool          Number of Times Stooled 1 x   1 x    Shift Total          585        Intake/Output Summary (Last 24 hours) at 18 1346  Last data filed at 18 0900   Gross per 24 hour   Intake             1320 ml   Output              650 ml   Net              670 ml          Body mass index is 19.79 kg/m².    Recent Labs      18   0548   SODIUM  141  141  142  141   POTASSIUM  3.5*  3.4*  4.4  4.0   CHLORIDE  105  108  111  111   CO2  25  23  20  23   BUN  48*  50*  47*  28*   CREATININE  1.42*  1.32  1.13  0.71   MAGNESIUM  2.0   --    --   1.7   CALCIUM  9.9  9.5  8.7  8.3*       GI/Nutrition:  Recent Labs      18   0548   ALTSGPT  34  31  31  23   ASTSGOT  50*  50*  48*  29   ALKPHOSPHAT  90  84  73  60   TBILIRUBIN  2.5*  2.4*  2.2*  1.4   LIPASE  <3*   --    --    --    GLUCOSE  190*  114*  119*  104*       Heme:  Recent Labs      18   0548   RBC  5.42*  5.05  5.03   HEMOGLOBIN  17.1*  15.7  16.0   HEMATOCRIT  48.8*  46.8  48.5*   PLATELETCT  177  147*  128*   PROTHROMBTM  14.3   --    --    APTT  27.4   --    --    INR  1.14*   --    --        Infectious Disease:  Temp  Av.8 °C (98.3 °F)  Min: 36.6 °C (97.8 °F)  Max: 37 °C (98.6 °F)  Recent Labs      18   0548   WBC  15.7*   --    --   12.5*  11.8*   NEUTSPOLYS  82.70*   --    --   77.80*  73.20*   LYMPHOCYTES  6.70*   --    --   10.00*  12.30*   MONOCYTES  9.90   --    --   11.00  12.40   EOSINOPHILS  0.10   --    --   0.60  1.20   BASOPHILS  0.20   --    --   0.20  0.40   ASTSGOT  50*  50*  48*   --   29   ALTSGPT  34  31  31   --   23   ALKPHOSPHAT  90  84  73   --   60   TBILIRUBIN  2.5*  2.4*   2.2*   --   1.4       Meds:  • acetaminophen  1,000 mg     • NS   83 mL/hr at 05/04/18 0543   • senna-docusate  2 Tab      And   • polyethylene glycol/lytes  1 Packet      And   • magnesium hydroxide  30 mL      And   • bisacodyl  10 mg     • Respiratory Care per Protocol       • nicotine  14 mg      And   • nicotine polacrilex  2 mg     • levETIRAcetam  500 mg     • vitamin D  2,000 Units     • folic acid  1 mg     • latanoprost  1 Drop     • metoprolol  25 mg     • prednisoLONE acetate  1 Drop     • labetalol  10 mg     • brimonidine  1 Drop      And   • timolol  1 Drop          Procedures:  None    Imaging:  CAROTID DUPLEX   Final Result      LE VENOUS DUPLEX (DVT)   Final Result      ECHOCARDIOGRAM COMP W/O CONT   Final Result      CT-HEAD W/O   Final Result         1.  Hyperdensity along the left tentorium, stable since prior, compatible with subdural versus subarachnoid hemorrhage.   2.  Changes of atrophy and small vessel ischemia.      DX-SHOULDER 1 VIEW LEFT   Final Result      No evidence of left shoulder dislocation.      DX-SHOULDER 2+ LEFT   Final Result      The humeral head is projected superiorly and posteriorly and the Y-view. This can be positional in nature or secondary to the posterior dislocation. Axillary views recommended for further evaluation.      DX-CHEST-PORTABLE (1 VIEW)   Final Result      Bilateral interstitial prominence. No focal consolidation.      CT-HEAD W/O   Final Result      Left tentorium small acute subdural hematoma.      Nonspecific white matter changes and cerebral volume loss.      Findings were discussed with ARJUN THORNE on 5/2/2018 4:53 PM.          Problem and Plan:       #Subdural hematoma  -Small in the L tentorium on CT head in ED . Repeat CT head showed not worsening   -Ground-level fall 2 days ago. Intermittently confused. Family not available this AM for further hx  -on admission per notes, exam showed R side weakness and dysarthria. Strength now equal  bilaterally and no dysarthria  -Neurosurgery following and reviewed CT scans. No changes in hematoma, OK to transfer to floor per note. Hold antiplt  -SCD for DVT prophylaxis   -Q4 neuro checks  -BP goal <140/90  -labetalol PRN  -Keppra started for seizure prophylaxis, will contact neurosurgery for duration     #Elevated CPK  #MARISELA- Resolved  -CPK 1089 down trending  -Cr baseline 0.8, on admission 1.4  -Given 500 mL normal saline in the ED  -BP stable, Tachycardic 90-100s, not requiring oxygen - EKG SR  -Denies CP, SOB, palpitaions  -continue NS  -UA shows 10-20 WBC, leuk esterase, no bacteria or nitrites. Asymptomatic, WBC improving, will not start abx      #Ground Level Fall  - complaining of weakness, PT/OT ordered     #Dysmetria - right sided neglect  #Right upper ext weakness   -on admission, resolved 5/3 AM, no dysarthria  -carotid doppler shows <50% stenosis bilaterally   -no DVT on LE US  -ECHO pending  -consider MRI    #Chronic diastolic heart failure  -Per echo this admission, grade 2 dysfunction. Compensated, continue to follow.     #Hypokalemia  -K 3.5 on admission. Replete and monitor     #Elevated troponin  -Trop 0.14--0.11--0.07  -No CP. Likely related to muscle injury, MARISELA     #Hypertension   -Has not been taking meds for 1 to 2 days and BP is in the 110s/50s  -Hold amlodipine and benazepril for now  -labetalol PRN     #Tobacco use - half pack a day for 60 years - no desire to quit - nicotine patch  #Osteopenia - on calcium and vitamin D       DISPO: Transfer to neurosurg     CODE STATUS: Full    Quality Measures:  Arias Catheter: none  DVT Prophylaxis: SCD  Ulcer Prophylaxis: none  Antibiotics: none  Lines: PIV

## 2018-05-04 NOTE — PROGRESS NOTES
Pulmonary Critical Care Progress Note      Date of service:  5/4/2018    Chief Complaint:  Headache    Interval Events:  24 hour interval history reviewed  Reason for visit:   SDH, rhabdomyolysis, acute kidney injury, hypertension  Seen with [employer] [facility]       - oriented x 4 this am   - SR   - moves all 4   - 4 L NC   - regular diet   - NS at 83   - replete Mg      She continues to have a headache.  It is about the same.  She denies any focal weakness, numbness or tingling.  She has no blurred vision, photophobia or diplopia.  She has no cough, sputum production or hemoptysis.  She denies any dyspnea.  She denies any chest pain or palpitations.  She continues to be sore on the left side of her body.      Review of Systems   Constitutional: Negative for chills, diaphoresis and malaise/fatigue.   HENT: Negative for congestion, ear discharge, ear pain, nosebleeds, sinus pain and tinnitus.    Eyes: Negative for blurred vision, double vision, photophobia and pain.   Respiratory: Negative for cough, hemoptysis, sputum production, shortness of breath and stridor.    Cardiovascular: Negative for chest pain, palpitations, orthopnea and leg swelling.   Gastrointestinal: Negative for abdominal pain, constipation, diarrhea, nausea and vomiting.   Genitourinary: Negative for dysuria, frequency, hematuria and urgency.   Musculoskeletal: Positive for falls. Negative for back pain, myalgias and neck pain.   Skin: Negative for itching and rash.   Neurological: Positive for headaches. Negative for sensory change, speech change, focal weakness and seizures.   Endo/Heme/Allergies: Negative for environmental allergies and polydipsia. Does not bruise/bleed easily.   Psychiatric/Behavioral: Negative for depression, hallucinations, substance abuse and suicidal ideas. The patient is not nervous/anxious.        Physical Exam   Constitutional: No distress.   Thin lady.  Very pleasant.   HENT:   Head: Normocephalic.   Right Ear: External ear  normal.   Left Ear: External ear normal.   Nose: Nose normal.   Mouth/Throat: Oropharynx is clear and moist. No oropharyngeal exudate.   Abrasion on the top of her head.   Eyes: Conjunctivae and EOM are normal. Pupils are equal, round, and reactive to light. Right eye exhibits no discharge. Left eye exhibits no discharge. No scleral icterus.   Neck: Neck supple. No JVD present. No tracheal deviation present.   Cardiovascular: Intact distal pulses.  Exam reveals no gallop and no friction rub.    No murmur heard.  Sinus rhythm   Pulmonary/Chest: Effort normal. No stridor. No respiratory distress. She has no wheezes. She has rales (Few coarse crackles).   Abdominal: Soft. Bowel sounds are normal. She exhibits no distension. There is no tenderness. There is no rebound.   Musculoskeletal:   No clubbing or cyanosis.  Bruising of the left shoulder.  Bruising of the right ankle.  Ecchymosis to the right hip region.   Neurological: She is alert. No cranial nerve deficit. GCS score is 15.   No focal weakness.   Skin: Skin is warm and dry. No rash noted. She is not diaphoretic. No pallor.       PFSH:  No change.    Respiratory:     Pulse Oximetry: 96 %  CXR personally reviewed and compared to prior images  CXR with mild interstitial prominence.    HemoDynamics:  Pulse: 67, Heart Rate (Monitored): 66  NIBP: 149/67       Recent Labs      05/02/18   1720  05/02/18   2355  05/03/18   0520   CPKTOTAL  1089*  1025*   --    TROPONINI  0.14*  0.11*  0.07*   BNPBTYPENAT  70   --    --        Neuro:    Fluids:  Intake/Output       05/02/18 0700 - 05/03/18 0659 05/03/18 0700 - 05/04/18 0659 05/04/18 0700 - 05/05/18 0659      1605-8095 1421-1778 Total 5034-8592 8336-7061 Total 5700-4503 1216-1089 Total       Intake    P.O.  --  -- --  120  120 240  --  -- --    P.O. -- -- -- 120 120 240 -- -- --    I.V.  --  1000 1000  1030  996 2026  --  -- --    IV Volume (NS) -- 500 500  -- -- --    IV Volume (NS 20 of K ) -- 500 500 200 --  200 -- -- --    Total Intake -- 1000 1000 1150 1116 2266 -- -- --       Output    Urine  --  550 550  475  -- 475  --  -- --    Number of Times Incontinent of Urine -- -- -- -- 2 x 2 x -- -- --    Urine Void (mL) (non-catheter) -- 100 100 -- -- -- -- -- --    Output (mL) ([REMOVED] Urinary Catheter Indwelling Catheter 16 Prydeinig) -- 450 450 475 -- 475 -- -- --    Stool  --  -- --  --  -- --  --  -- --    Number of Times Stooled -- 0 x 0 x -- 0 x 0 x -- -- --    Total Output -- 550 550 475 -- 475 -- -- --       Net I/O     -- 450  1791 -- -- --           Recent Labs      18   0520  18   0548   SODIUM  141  141  142  141   POTASSIUM  3.5*  3.4*  4.4  4.0   CHLORIDE  105  108  111  111   CO2  25  23  20  23   BUN  48*  50*  47*  28*   CREATININE  1.42*  1.32  1.13  0.71   MAGNESIUM  2.0   --    --   1.7   CALCIUM  9.9  9.5  8.7  8.3*       GI/Nutrition:    Liver Function  Recent Labs      18   0520  18   0548   ALTSGPT  34  31  31  23   ASTSGOT  50*  50*  48*  29   ALKPHOSPHAT  90  84  73  60   TBILIRUBIN  2.5*  2.4*  2.2*  1.4   LIPASE  <3*   --    --    --    GLUCOSE  190*  114*  119*  104*       Heme:  Recent Labs      18   0619  18   0548   RBC  5.42*  5.05  5.03   HEMOGLOBIN  17.1*  15.7  16.0   HEMATOCRIT  48.8*  46.8  48.5*   PLATELETCT  177  147*  128*   PROTHROMBTM  14.3   --    --    APTT  27.4   --    --    INR  1.14*   --    --        Infectious Disease:  Temp  Av.8 °C (98.2 °F)  Min: 36.6 °C (97.8 °F)  Max: 37 °C (98.6 °F)    Recent Labs      18   1720  18   2355  18   0520  18   0619  18   0548   WBC  15.7*   --    --   12.5*  11.8*   NEUTSPOLYS  82.70*   --    --   77.80*  73.20*   LYMPHOCYTES  6.70*   --    --   10.00*  12.30*   MONOCYTES  9.90   --    --   11.00  12.40   EOSINOPHILS  0.10   --    --   0.60  1.20   BASOPHILS  0.20   --     --   0.20  0.40   ASTSGOT  50*  50*  48*   --   29   ALTSGPT  34  31  31   --   23   ALKPHOSPHAT  90  84  73   --   60   TBILIRUBIN  2.5*  2.4*  2.2*   --   1.4     Current Facility-Administered Medications   Medication Dose Frequency Provider Last Rate Last Dose   • NS infusion   Continuous Piter Kuhn M.D. 83 mL/hr at 05/04/18 0543     • senna-docusate (PERICOLACE or SENOKOT S) 8.6-50 MG per tablet 2 Tab  2 Tab BID Glenn Schwarz D.O.   2 Tab at 05/03/18 2017    And   • polyethylene glycol/lytes (MIRALAX) PACKET 1 Packet  1 Packet QDAY PRN SAUL López.O.        And   • magnesium hydroxide (MILK OF MAGNESIA) suspension 30 mL  30 mL QDAY PRN SAUL López.O.        And   • bisacodyl (DULCOLAX) suppository 10 mg  10 mg QDAY PRN SAUL López.O.       • Respiratory Care per Protocol   Continuous RT Glenn Schwarz D.O.       • nicotine (NICODERM) 14 MG/24HR 14 mg  14 mg Daily-0600 MCKINLEY LópezO.   14 mg at 05/04/18 0543    And   • nicotine polacrilex (NICORETTE) 2 MG piece 2 mg  2 mg Q HOUR PRN SAUL López.O.       • levETIRAcetam (KEPPRA) 100 MG/ML solution 500 mg  500 mg Q12HRS Danielle Johnson M.D.   500 mg at 05/03/18 2018   • vitamin D (cholecalciferol) tablet 2,000 Units  2,000 Units MO, WE + FR Danielle Johnson M.D.       • folic acid (FOLVITE) tablet 1 mg  1 mg MO, WE + FR Danielle Johnson M.D.       • latanoprost (XALATAN) 0.005 % ophthalmic solution 1 Drop  1 Drop QHS Danielle Johnson M.D.   1 Drop at 05/03/18 2017   • metoprolol (LOPRESSOR) tablet 25 mg  25 mg BID Danielle Johnson M.D.   25 mg at 05/03/18 2017   • prednisoLONE acetate (PRED FORTE) 1 % ophthalmic suspension 1 Drop  1 Drop 4X/DAY Danielle Johnson M.D.   1 Drop at 05/03/18 2017   • labetalol (NORMODYNE,TRANDATE) injection 10 mg  10 mg Q6HRS PRN Danielle Johnson M.D.       • brimonidine (ALPHAGAN) 0.2 % ophthalmic solution 1 Drop  1 Drop BID Danielle Johnson M.D.   1 Drop at  05/03/18 2016    And   • timolol (TIMOPTIC) 0.5 % ophthalmic solution 1 Drop  1 Drop BID Danielle Johnson M.D.   1 Drop at 05/03/18 2016     Last reviewed on 5/2/2018  5:01 PM by Michelle Carrillo [facility]    Quality  Measures:  Medications reviewed, Labs reviewed and Radiology images reviewed  Arias catheter: Critically Ill - Requiring Accurate Measurement of Urinary Output      DVT Prophylaxis: Contraindicated - High bleeding risk  DVT prophylaxis - mechanical: SCDs  Ulcer prophylaxis: Not indicated          Assessment and Plan:    Acute subdural hematoma   -Small, along left tentorium   -Stable on CT scan of 5/3 compared to 5/2   -Continue every 4 hours neuro checks   -No anticoagulation or antiplatelet medications   -Continue Keppra, 500 mg twice daily    Acute kidney injury   -Resolving with intravenous hydration    Rhabdomyolysis   -This lady was found down   -Continue IV hydration    Hypertension   -Continue current dose of metoprolol, 25 mg twice daily    Elevated troponin   -Trending down    Chronic diastolic heart failure   -Grade 2 diastolic dysfunction   -Compensated    Mild aortic stenosis   -Mean gradient 11 mmHg      OK to transfer out of ICU.  [facility] will sign off on transfer.  Please call if you have any questions.    Discussed with RN, RT, team, clinical pharmacist, residents

## 2018-05-04 NOTE — PROGRESS NOTES
Neurosurgery Progress Note    Subjective:  Hospital Day #2  Traumatic Tentorial SDH S/p fall at home, found after 12+hours on floor.  Unable to get up with legs feeling weak.] + Rhabdo   Seen by Dr. Sumner with Small tentorial SDH last night.  Remebers falling/tripping . No LOC  Moving all extremities this AM, but achy all over.  +headach and Right Ey opening weakness, but otherwise Neuro intact.  Repeat Ct Head this AM Unchanged (No advancement)  Alert, awake, appropriate and responsive      Exam:  A&O x 3, awake, Answers appropriately  PERRLA, EOMI Slight Ptosis in Right eye noted, CN2 -12 otherwise normal  NARANJO x4 with 5/5 strength  Speaech clear and fluent  Memory intact  Sensor intact.    No drift.      Pulse  Av.2  Min: 54  Max: 87  Resp  Av.8  Min: 18  Max: 32  Temp  Av.8 °C (98.3 °F)  Min: 36.6 °C (97.8 °F)  Max: 37 °C (98.6 °F)  SpO2  Av.5 %  Min: 82 %  Max: 99 %    No Data Recorded    Recent Labs      18   1720  18   0619  18   0548   WBC  15.7*  12.5*  11.8*   RBC  5.42*  5.05  5.03   HEMOGLOBIN  17.1*  15.7  16.0   HEMATOCRIT  48.8*  46.8  48.5*   MCV  90.0  92.7  96.4   MCH  31.5  31.1  31.8   MCHC  35.0  33.5*  33.0*   RDW  43.6  45.8  48.6   PLATELETCT  177  147*  128*   MPV  11.8  12.1  11.7     Recent Labs      18   2355  18   0520  18   0548   SODIUM  141  142  141   POTASSIUM  3.4*  4.4  4.0   CHLORIDE  108  111  111   CO2  23  20  23   GLUCOSE  114*  119*  104*   BUN  50*  47*  28*   CREATININE  1.32  1.13  0.71   CALCIUM  9.5  8.7  8.3*     Recent Labs      18   1720   APTT  27.4   INR  1.14*           Intake/Output       18 0700 - 18 0659 18 - 18 0659      1900-0659 Total  Total       Intake    P.O.  120  120 240  --  -- --    P.O. 120 120 240 -- -- --    I.V.  1030  996   --  -- --    IV Volume (NS)  -- -- --    IV Volume (NS 20 of K ) 200 -- 200 -- -- --     Total Intake 1150 1116 2266 -- -- --       Output    Urine  475  -- 475  --  -- --    Number of Times Incontinent of Urine -- 2 x 2 x -- -- --    Output (mL) ([REMOVED] Urinary Catheter Indwelling Catheter 16 Albanian) 475 -- 475 -- -- --    Stool  --  -- --  --  -- --    Number of Times Stooled -- 0 x 0 x -- -- --    Total Output 475 -- 475 -- -- --       Net I/O     675 1116 1791 -- -- --            Intake/Output Summary (Last 24 hours) at 05/04/18 0817  Last data filed at 05/04/18 0600   Gross per 24 hour   Intake             1946 ml   Output              475 ml   Net             1471 ml            • magnesium sulfate  2 g Once   • acetaminophen  1,000 mg Q6HRS PRN   • NS   Continuous   • senna-docusate  2 Tab BID    And   • polyethylene glycol/lytes  1 Packet QDAY PRN    And   • magnesium hydroxide  30 mL QDAY PRN    And   • bisacodyl  10 mg QDAY PRN   • Respiratory Care per Protocol   Continuous RT   • nicotine  14 mg Daily-0600    And   • nicotine polacrilex  2 mg Q HOUR PRN   • levETIRAcetam  500 mg Q12HRS   • vitamin D  2,000 Units MO, WE + FR   • folic acid  1 mg MO, WE + FR   • latanoprost  1 Drop QHS   • metoprolol  25 mg BID   • prednisoLONE acetate  1 Drop 4X/DAY   • labetalol  10 mg Q6HRS PRN   • brimonidine  1 Drop BID    And   • timolol  1 Drop BID       Assessment and Plan:  Hospital day #2  Seen by Dr. Sumner yesterday and AM CT also reviewed with him with no acute worsening of stable Tentorial SDH.  Exam is stable this AM.   Headaches and body aches from fall.    No deficits.  Plan:  Will let Trauma/Intensivists manage.    Will have Pt f/u with Dr. Sumner at [facility] in 2 weeks and will repeat CT head in 1 month to ensure SDH resolving.    Will sign off, but call [facility] if sx worsen

## 2018-05-05 LAB
BACTERIA UR CULT: NORMAL
EKG IMPRESSION: NORMAL
SIGNIFICANT IND 70042: NORMAL
SITE SITE: NORMAL
SOURCE SOURCE: NORMAL

## 2018-05-05 PROCEDURE — 700111 HCHG RX REV CODE 636 W/ 250 OVERRIDE (IP): Performed by: INTERNAL MEDICINE

## 2018-05-05 PROCEDURE — 700102 HCHG RX REV CODE 250 W/ 637 OVERRIDE(OP): Performed by: STUDENT IN AN ORGANIZED HEALTH CARE EDUCATION/TRAINING PROGRAM

## 2018-05-05 PROCEDURE — 770001 HCHG ROOM/CARE - MED/SURG/GYN PRIV*

## 2018-05-05 PROCEDURE — 700102 HCHG RX REV CODE 250 W/ 637 OVERRIDE(OP): Performed by: INTERNAL MEDICINE

## 2018-05-05 PROCEDURE — A9270 NON-COVERED ITEM OR SERVICE: HCPCS | Performed by: INTERNAL MEDICINE

## 2018-05-05 PROCEDURE — 700101 HCHG RX REV CODE 250: Performed by: STUDENT IN AN ORGANIZED HEALTH CARE EDUCATION/TRAINING PROGRAM

## 2018-05-05 PROCEDURE — A9270 NON-COVERED ITEM OR SERVICE: HCPCS | Performed by: STUDENT IN AN ORGANIZED HEALTH CARE EDUCATION/TRAINING PROGRAM

## 2018-05-05 RX ORDER — HYDRALAZINE HYDROCHLORIDE 20 MG/ML
10-20 INJECTION INTRAMUSCULAR; INTRAVENOUS EVERY 4 HOURS PRN
Status: DISCONTINUED | OUTPATIENT
Start: 2018-05-05 | End: 2018-05-07 | Stop reason: HOSPADM

## 2018-05-05 RX ORDER — MAGNESIUM SULFATE HEPTAHYDRATE 40 MG/ML
2 INJECTION, SOLUTION INTRAVENOUS ONCE
Status: DISCONTINUED | OUTPATIENT
Start: 2018-05-05 | End: 2018-05-05

## 2018-05-05 RX ADMIN — LATANOPROST 1 DROP: 50 SOLUTION OPHTHALMIC at 20:18

## 2018-05-05 RX ADMIN — LEVETIRACETAM 500 MG: 100 SOLUTION ORAL at 20:20

## 2018-05-05 RX ADMIN — PREDNISOLONE ACETATE 1 DROP: 10 SUSPENSION/ DROPS OPHTHALMIC at 12:24

## 2018-05-05 RX ADMIN — BRIMONIDINE TARTRATE 1 DROP: 2 SOLUTION OPHTHALMIC at 20:18

## 2018-05-05 RX ADMIN — TIMOLOL MALEATE 1 DROP: 5 SOLUTION OPHTHALMIC at 20:19

## 2018-05-05 RX ADMIN — PREDNISOLONE ACETATE 1 DROP: 10 SUSPENSION/ DROPS OPHTHALMIC at 20:18

## 2018-05-05 RX ADMIN — STANDARDIZED SENNA CONCENTRATE AND DOCUSATE SODIUM 2 TABLET: 8.6; 5 TABLET, FILM COATED ORAL at 20:21

## 2018-05-05 RX ADMIN — PREDNISOLONE ACETATE 1 DROP: 10 SUSPENSION/ DROPS OPHTHALMIC at 08:49

## 2018-05-05 RX ADMIN — LEVETIRACETAM 500 MG: 100 SOLUTION ORAL at 08:46

## 2018-05-05 RX ADMIN — PREDNISOLONE ACETATE 1 DROP: 10 SUSPENSION/ DROPS OPHTHALMIC at 17:26

## 2018-05-05 RX ADMIN — METOPROLOL TARTRATE 25 MG: 25 TABLET, FILM COATED ORAL at 20:20

## 2018-05-05 RX ADMIN — HYDRALAZINE HYDROCHLORIDE 20 MG: 20 INJECTION INTRAMUSCULAR; INTRAVENOUS at 18:43

## 2018-05-05 RX ADMIN — BRIMONIDINE TARTRATE 1 DROP: 2 SOLUTION OPHTHALMIC at 08:47

## 2018-05-05 RX ADMIN — ACETAMINOPHEN 1000 MG: 500 TABLET ORAL at 08:43

## 2018-05-05 RX ADMIN — ACETAMINOPHEN 1000 MG: 500 TABLET ORAL at 17:26

## 2018-05-05 RX ADMIN — TIMOLOL MALEATE 1 DROP: 5 SOLUTION OPHTHALMIC at 08:48

## 2018-05-05 RX ADMIN — METOPROLOL TARTRATE 25 MG: 25 TABLET, FILM COATED ORAL at 10:23

## 2018-05-05 RX ADMIN — LABETALOL HYDROCHLORIDE 10 MG: 5 INJECTION, SOLUTION INTRAVENOUS at 05:09

## 2018-05-05 RX ADMIN — NICOTINE 14 MG: 14 PATCH, EXTENDED RELEASE TRANSDERMAL at 05:39

## 2018-05-05 ASSESSMENT — ENCOUNTER SYMPTOMS
ABDOMINAL PAIN: 0
VOMITING: 0
SHORTNESS OF BREATH: 0
ORTHOPNEA: 0
CHILLS: 0
SINUS PAIN: 0
BRUISES/BLEEDS EASILY: 0
STRIDOR: 0
POLYDIPSIA: 0
HEADACHES: 1
CONSTIPATION: 0
EYE PAIN: 0
SENSORY CHANGE: 0
NAUSEA: 0
RESPIRATORY NEGATIVE: 1
NECK PAIN: 0
TINGLING: 0
DIARRHEA: 0
DEPRESSION: 0
TREMORS: 0
FEVER: 0
DOUBLE VISION: 0
SPEECH CHANGE: 0
EYES NEGATIVE: 1
PALPITATIONS: 0
SEIZURES: 0
HEADACHES: 0
BACK PAIN: 0
COUGH: 0
PHOTOPHOBIA: 0
WEAKNESS: 1
CARDIOVASCULAR NEGATIVE: 1
DIZZINESS: 0
SPUTUM PRODUCTION: 0
BLURRED VISION: 0
GASTROINTESTINAL NEGATIVE: 1
FOCAL WEAKNESS: 0
HEMOPTYSIS: 0
NERVOUS/ANXIOUS: 0
MYALGIAS: 0
FALLS: 1
HALLUCINATIONS: 0
SORE THROAT: 0

## 2018-05-05 ASSESSMENT — PAIN SCALES - GENERAL
PAINLEVEL_OUTOF10: 1
PAINLEVEL_OUTOF10: 3
PAINLEVEL_OUTOF10: 10
PAINLEVEL_OUTOF10: 3
PAINLEVEL_OUTOF10: 0
PAINLEVEL_OUTOF10: 0
PAINLEVEL_OUTOF10: 2
PAINLEVEL_OUTOF10: 0
PAINLEVEL_OUTOF10: 0

## 2018-05-05 ASSESSMENT — LIFESTYLE VARIABLES: SUBSTANCE_ABUSE: 0

## 2018-05-05 NOTE — PROGRESS NOTES
Spoke with Dr. Lamb about pt's transfer orders, orders received to discontinue Cardiac monitoring order.

## 2018-05-05 NOTE — RESPIRATORY CARE
COPD EDUCATION by COPD CLINICAL EDUCATOR  5/5/2018 at 8:55 AM by Migdalia Manriquez     Patient reviewed by COPD education team. Patient does not qualify for COPD program.

## 2018-05-05 NOTE — PROGRESS NOTES
Pulmonary Critical Care Progress Note      Date of service:  5/5/2018    Chief Complaint:  Headache    Interval Events:  24 hour interval history reviewed  Reason for visit:   SDH, rhabdomyolysis, acute kidney injury, hypertension  Seen with [employer] [facility]       - still hurts on the left side   - neuro checks q 4   - SB      Her headache is a little better.  She does not have focal weakness, numbness or tingling.  She does not have photophobia, diplopia or blurred vision.  She continues to complain of some pain on her left side.  She has no cough, sputum production or hemoptysis.  She denies any angina or palpitations.  The pain on her left side is worse with movement and better with rest.  Her pain is mostly dull but occasionally sharp in nature.      Review of Systems   Constitutional: Negative for chills and fever.   HENT: Negative for congestion, ear discharge, ear pain, nosebleeds, sinus pain and tinnitus.    Eyes: Negative for blurred vision, double vision, photophobia and pain.   Respiratory: Negative for cough, hemoptysis, sputum production, shortness of breath and stridor.    Cardiovascular: Negative for chest pain, palpitations, orthopnea and leg swelling.   Gastrointestinal: Negative for abdominal pain, constipation, diarrhea, nausea and vomiting.   Genitourinary: Negative for dysuria, frequency, hematuria and urgency.   Musculoskeletal: Positive for falls. Negative for back pain, myalgias and neck pain.   Skin: Negative for itching and rash.   Neurological: Positive for headaches. Negative for sensory change, speech change, focal weakness and seizures.   Endo/Heme/Allergies: Negative for environmental allergies and polydipsia. Does not bruise/bleed easily.   Psychiatric/Behavioral: Negative for depression, hallucinations, substance abuse and suicidal ideas. The patient is not nervous/anxious.        Physical Exam   Constitutional: No distress.   Thin lady.  Very pleasant.   HENT:   Head: Normocephalic.    Right Ear: External ear normal.   Left Ear: External ear normal.   Mouth/Throat: No oropharyngeal exudate.   Abrasion on the top of her head.   Eyes: Conjunctivae are normal. Pupils are equal, round, and reactive to light. Right eye exhibits no discharge. Left eye exhibits no discharge. No scleral icterus.   Neck: Normal range of motion. Neck supple. No JVD present. No tracheal deviation present.   Cardiovascular: Intact distal pulses.  Exam reveals no gallop and no friction rub.    No murmur heard.  Sinus rhythm   Pulmonary/Chest: Effort normal. No stridor. No respiratory distress. She has no wheezes. She has rales (Few coarse crackles).   Abdominal: Soft. Bowel sounds are normal. She exhibits no distension. There is no tenderness. There is no rebound.   Musculoskeletal:   No clubbing or cyanosis.  Bruising of the left shoulder.  Bruising of the right ankle.  Ecchymosis to the right hip region.   Lymphadenopathy:     She has no cervical adenopathy.   Neurological: She is alert. No cranial nerve deficit. GCS score is 15.   No focal weakness.   Skin: Skin is warm and dry. No rash noted. She is not diaphoretic. No pallor.       PFSH:  No change.    Respiratory:     Pulse Oximetry: 97 %  CXR personally reviewed and compared to prior images  CXR with mild interstitial prominence.    HemoDynamics:  Pulse: (!) 56, Heart Rate (Monitored): 63  Blood Pressure :  (labetalol given, see MAR), NIBP: 156/72       Recent Labs      05/02/18   1720  05/02/18   2355  05/03/18   0520   CPKTOTAL  1089*  1025*   --    TROPONINI  0.14*  0.11*  0.07*   BNPBTYPENAT  70   --    --        Neuro:    Fluids:  Intake/Output       05/03/18 0700 - 05/04/18 0659 05/04/18 0700 - 05/05/18 0659 05/05/18 0700 - 05/06/18 0659      6034-7743 8018-2156 Total 8022-8231 0482-1530 Total 3710-3915 7220-8129 Total       Intake    P.O.  120  120 240  --  200 200  --  -- --    P.O. 120 120 240 -- 200 200 -- -- --    I.V.  1030  996 2026  1046  830 1876  --   -- --    Magnesium Sulfate Volume -- -- -- 50 -- 50 -- -- --    IV Volume (NS)   -- -- --    IV Volume (NS 20 of K ) 200 -- 200 -- -- -- -- -- --    Other  --  -- --  120  -- 120  --  -- --    Medications (P.O./ Enteral Liquids) -- -- -- 120 -- 120 -- -- --    Total Intake 1150 1116 2266 1166 1030 2196 -- -- --       Output    Urine  475  -- 475  --  -- --  --  -- --    Number of Times Voided -- -- -- -- 2 x 2 x -- -- --    Number of Times Incontinent of Urine -- 2 x 2 x 3 x 3 x 6 x -- -- --    Output (mL) ([REMOVED] Urinary Catheter Indwelling Catheter 16 Georgian) 475 -- 475 -- -- -- -- -- --    Stool  --  -- --  --  -- --  --  -- --    Number of Times Stooled -- 0 x 0 x 1 x -- 1 x -- -- --    Total Output 475 -- 475 -- -- -- -- -- --       Net I/O     675 1116 1791 1166 1030 2196 -- -- --           Recent Labs      18   0548   SODIUM  141  141  142  141   POTASSIUM  3.5*  3.4*  4.4  4.0   CHLORIDE  105  108  111  111   CO2  25  23  20  23   BUN  48*  50*  47*  28*   CREATININE  1.42*  1.32  1.13  0.71   MAGNESIUM  2.0   --    --   1.7   CALCIUM  9.9  9.5  8.7  8.3*       GI/Nutrition:    Liver Function  Recent Labs      18   0520  18   0548   ALTSGPT  34  31  31  23   ASTSGOT  50*  50*  48*  29   ALKPHOSPHAT  90  84  73  60   TBILIRUBIN  2.5*  2.4*  2.2*  1.4   LIPASE  <3*   --    --    --    GLUCOSE  190*  114*  119*  104*       Heme:  Recent Labs      18   1720  18   0619  18   0548   RBC  5.42*  5.05  5.03   HEMOGLOBIN  17.1*  15.7  16.0   HEMATOCRIT  48.8*  46.8  48.5*   PLATELETCT  177  147*  128*   PROTHROMBTM  14.3   --    --    APTT  27.4   --    --    INR  1.14*   --    --        Infectious Disease:  Temp  Av.1 °C (98.8 °F)  Min: 37 °C (98.6 °F)  Max: 37.2 °C (98.9 °F)    Recent Labs      18   1720  18   2355  18   0520  18   0619   05/04/18   0548   WBC  15.7*   --    --   12.5*  11.8*   NEUTSPOLYS  82.70*   --    --   77.80*  73.20*   LYMPHOCYTES  6.70*   --    --   10.00*  12.30*   MONOCYTES  9.90   --    --   11.00  12.40   EOSINOPHILS  0.10   --    --   0.60  1.20   BASOPHILS  0.20   --    --   0.20  0.40   ASTSGOT  50*  50*  48*   --   29   ALTSGPT  34  31  31   --   23   ALKPHOSPHAT  90  84  73   --   60   TBILIRUBIN  2.5*  2.4*  2.2*   --   1.4     Current Facility-Administered Medications   Medication Dose Frequency Provider Last Rate Last Dose   • acetaminophen (TYLENOL) tablet 1,000 mg  1,000 mg Q6HRS PRN Piter Kuhn M.D.   1,000 mg at 05/04/18 1924   • NS infusion   Continuous Piter Kuhn M.D. 83 mL/hr at 05/04/18 2046     • senna-docusate (PERICOLACE or SENOKOT S) 8.6-50 MG per tablet 2 Tab  2 Tab BID SAUL López.O.   2 Tab at 05/03/18 2017    And   • polyethylene glycol/lytes (MIRALAX) PACKET 1 Packet  1 Packet QDAY PRN SAUL López.O.        And   • magnesium hydroxide (MILK OF MAGNESIA) suspension 30 mL  30 mL QDAY PRN MCKINLEY LópezO.        And   • bisacodyl (DULCOLAX) suppository 10 mg  10 mg QDAY PRN Glenn Schwarz D.O.       • Respiratory Care per Protocol   Continuous RT SAUL López.O.       • nicotine (NICODERM) 14 MG/24HR 14 mg  14 mg Daily-0600 MCKINLEY LópezODavid   14 mg at 05/05/18 0539    And   • nicotine polacrilex (NICORETTE) 2 MG piece 2 mg  2 mg Q HOUR PRN SAUL López.O.       • levETIRAcetam (KEPPRA) 100 MG/ML solution 500 mg  500 mg Q12HRS Danielle Johnson M.D.   500 mg at 05/04/18 2024   • vitamin D (cholecalciferol) tablet 2,000 Units  2,000 Units MO, WE + FR Danielle Johnson M.D.   2,000 Units at 05/04/18 0802   • folic acid (FOLVITE) tablet 1 mg  1 mg MO, WE + FR Danilele Johnson M.D.   1 mg at 05/04/18 0801   • latanoprost (XALATAN) 0.005 % ophthalmic solution 1 Drop  1 Drop QHS Danielle Johnson M.D.   1 Drop at 05/04/18 2024   •  metoprolol (LOPRESSOR) tablet 25 mg  25 mg BID Danielle Johnson M.D.   25 mg at 05/04/18 2023   • prednisoLONE acetate (PRED FORTE) 1 % ophthalmic suspension 1 Drop  1 Drop 4X/DAY Danielle Johnson M.D.   1 Drop at 05/04/18 2100   • labetalol (NORMODYNE,TRANDATE) injection 10 mg  10 mg Q6HRS PRN Danielle Johnson M.D.   10 mg at 05/05/18 0509   • brimonidine (ALPHAGAN) 0.2 % ophthalmic solution 1 Drop  1 Drop BID Danielle Johnson M.D.   1 Drop at 05/04/18 2100    And   • timolol (TIMOPTIC) 0.5 % ophthalmic solution 1 Drop  1 Drop BID Danielle Johnson M.D.   1 Drop at 05/04/18 2100     Last reviewed on 5/2/2018  5:01 PM by Lonnie Berry    Quality  Measures:  Medications reviewed, Labs reviewed and Radiology images reviewed  Arias catheter: Critically Ill - Requiring Accurate Measurement of Urinary Output      DVT Prophylaxis: Contraindicated - High bleeding risk  DVT prophylaxis - mechanical: SCDs  Ulcer prophylaxis: Not indicated          Assessment and Plan:    Acute subdural hematoma   -Small, along left tentorium   -Stable on CT scan of 5/3 compared to 5/2   -Continue every 4 hours neuro checks   -No anticoagulation or antiplatelet medications   -Continue Keppra, 500 mg twice daily    Acute kidney injury   -Resolved with intravenous hydration    Rhabdomyolysis   -This lady was found down   -Resolved    Hypertension   -Continue current dose of metoprolol, 25 mg twice daily    Elevated troponin   -Trending down    Chronic diastolic heart failure   -Grade 2 diastolic dysfunction   -Compensated    Mild aortic stenosis   -Mean gradient 11 mmHg      OK to transfer out of ICU.  [facility] will sign off on transfer.  Please call if you have any questions.    Discussed with RN, RT, team, clinical pharmacist, residents

## 2018-05-05 NOTE — PROGRESS NOTES
This RN spoke to [place] RN and addressed that pt has an active order to insert a mustafa catheter. Per Indira CARUSO, pt does not have a mustafa catheter and she will dc this order. Indira CARUSO also noted that she will dc order for tele monitoring.

## 2018-05-05 NOTE — PROGRESS NOTES
"[facility] Progress Note      Admit Date: 5/2/2018  ICU Day: 3    Resident(s): Bharti Boss  Attending: APURVA GORDON/ Dr. Kuhn      HPI:  78 y/o f with a pmh of HTN, DLD, osteopenia, breast CA (s/p mastectomy and radiation 2004), tobacco use brought to the ED by her family after she was found down. She was last seen at her baseline 4/30/18. States she was looking for her phone in a room filled with many objects, she tripped and fell and remembers hitting her head. States her legs \"gave out and didn't seem to want to work\" afterwards and wasn't able to stand for about 12 hours. She never lost consciousness but state although she remembered what happened her overall memory of the event is a little off. Both lower extremities bilaterally, no side was weaker than there other. No bleeding from the head. She had L shoulder pain, Xray in the ED shows no dislocation. She has ecchymosis on her R hip, weak but able to move all extremities. She was reported to have dysarthria on admission.  Labs on admission showed mild rhabdo and MARISELA. Started on IV fluids.   CT head showed small subdural hematoma, neurosurgery consutled and she was admitted to ICU.      Consultants:  Neurosurgery   PMA: Peyman Alston    Interval Events:  Pending transfer to neurosurg floor.  No neurological deficits. Chronic R eye ptosis  Afebrile, hemodynamically stable  Still weak, PT/OT following. SNF orders placed, pt agreeable    Review of Systems   Constitutional: Negative for chills and fever.   HENT: Negative.  Negative for sore throat.    Eyes: Negative.    Respiratory: Negative.    Cardiovascular: Negative.    Gastrointestinal: Negative.    Genitourinary: Negative.    Musculoskeletal:        L shoulder pain   Skin: Negative.  Negative for rash.   Neurological: Positive for weakness. Negative for dizziness, tingling, tremors, sensory change, speech change, focal weakness and headaches.        Generalized weakness   Endo/Heme/Allergies: " "Negative.        PHYSICAL EXAM  Vitals:    05/05/18 0500 05/05/18 0700 05/05/18 0800 05/05/18 0900   BP:       Pulse: (!) 56  69 (!) 55   Resp: 20  (!) 22 18   Temp: 37.1 °C (98.8 °F)  36.6 °C (97.8 °F)    SpO2: 97% 96% 97% 97%   Weight:       Height:         Body mass index is 19.79 kg/m².  /68   Pulse (!) 55   Temp 36.6 °C (97.8 °F)   Resp 18   Ht 1.626 m (5' 4\")   Wt 52.3 kg (115 lb 4.8 oz)   SpO2 97%   BMI 19.79 kg/m²   O2 therapy: Pulse Oximetry: 97 %, O2 (LPM): 4, O2 Delivery: Nasal Cannula    Physical Exam   Constitutional: She is well-developed, well-nourished, and in no distress. No distress.   HENT:   Head: Normocephalic and atraumatic.   Mouth/Throat: Oropharynx is clear and moist. No oropharyngeal exudate.   Eyes: Conjunctivae and EOM are normal. Pupils are equal, round, and reactive to light. No scleral icterus.   Neck: Normal range of motion. Neck supple.   Cardiovascular: Normal rate and normal heart sounds.    No murmur heard.  Pulmonary/Chest: Effort normal and breath sounds normal. No respiratory distress. She has no wheezes. She has no rales. She exhibits no tenderness.   Abdominal: Soft. Bowel sounds are normal. She exhibits no distension. There is no tenderness. There is no rebound and no guarding.   Neurological: She is alert. No cranial nerve deficit.   A&Ox2, to person and place not to time. 5/5 strength in upper extremities bilaterally. 4/5 strength in lower extremities bilaterally. Resting tremor L>R upper extremity   Skin: Skin is warm and dry. She is not diaphoretic.   ecchymosis over R hip        Respiratory:     Respiration: 18, Pulse Oximetry: 97 %    Chest Tube Drains:          Invalid input(s): [id number]    HemoDynamics:  Pulse: (!) 55, Heart Rate (Monitored): (!) 54 Blood Pressure :  (labetalol given, see MAR), NIBP: 153/69      Neuro:      Fluids:    Date 05/05/18 0700 - 05/06/18 0659   Shift 8619-6702 4547-2948 9905-4052 24 Hour Total   I  N  T  A  K  E   I.V. " 166   166      IV Volume (NS) 166   166    Other 120   120      Medications (P.O./ Enteral Liquids) 120   120    Shift Total 286   286   O  U  T  P  U  T   Urine          Number of Times Voided 1 x   1 x      Number of Times Incontinent of Urine 1 x   1 x    Shift Total          286        Intake/Output Summary (Last 24 hours) at 18 1346  Last data filed at 18 0900   Gross per 24 hour   Intake             1320 ml   Output              650 ml   Net              670 ml          Body mass index is 19.79 kg/m².    Recent Labs      18   0548   SODIUM  141  141  142  141   POTASSIUM  3.5*  3.4*  4.4  4.0   CHLORIDE  105  108  111  111   CO2  25  23  20  23   BUN  48*  50*  47*  28*   CREATININE  1.42*  1.32  1.13  0.71   MAGNESIUM  2.0   --    --   1.7   CALCIUM  9.9  9.5  8.7  8.3*       GI/Nutrition:  Recent Labs      18   0548   ALTSGPT  34  31  31  23   ASTSGOT  50*  50*  48*  29   ALKPHOSPHAT  90  84  73  60   TBILIRUBIN  2.5*  2.4*  2.2*  1.4   LIPASE  <3*   --    --    --    GLUCOSE  190*  114*  119*  104*       Heme:  Recent Labs      18   0548   RBC  5.42*  5.05  5.03   HEMOGLOBIN  17.1*  15.7  16.0   HEMATOCRIT  48.8*  46.8  48.5*   PLATELETCT  177  147*  128*   PROTHROMBTM  14.3   --    --    APTT  27.4   --    --    INR  1.14*   --    --        Infectious Disease:  Temp  Av °C (98.6 °F)  Min: 36.6 °C (97.8 °F)  Max: 37.2 °C (98.9 °F)  Recent Labs      18   0548   WBC  15.7*   --    --   12.5*  11.8*   NEUTSPOLYS  82.70*   --    --   77.80*  73.20*   LYMPHOCYTES  6.70*   --    --   10.00*  12.30*   MONOCYTES  9.90   --    --   11.00  12.40   EOSINOPHILS  0.10   --    --   0.60  1.20   BASOPHILS  0.20   --    --   0.20  0.40   ASTSGOT  50*  50*  48*   --   29    ALTSGPT  34  31  31   --   23   ALKPHOSPHAT  90  84  73   --   60   TBILIRUBIN  2.5*  2.4*  2.2*   --   1.4       Meds:  • magnesium sulfate  2 g     • hydrALAZINE  10-20 mg     • acetaminophen  1,000 mg     • NS   83 mL/hr at 05/04/18 2046   • senna-docusate  2 Tab      And   • polyethylene glycol/lytes  1 Packet      And   • magnesium hydroxide  30 mL      And   • bisacodyl  10 mg     • Respiratory Care per Protocol       • nicotine  14 mg      And   • nicotine polacrilex  2 mg     • levETIRAcetam  500 mg     • vitamin D  2,000 Units     • folic acid  1 mg     • latanoprost  1 Drop     • metoprolol  25 mg     • prednisoLONE acetate  1 Drop     • labetalol  10 mg     • brimonidine  1 Drop      And   • timolol  1 Drop          Procedures:  None    Imaging:  CAROTID DUPLEX   Final Result      LE VENOUS DUPLEX (DVT)   Final Result      ECHOCARDIOGRAM COMP W/O CONT   Final Result      CT-HEAD W/O   Final Result         1.  Hyperdensity along the left tentorium, stable since prior, compatible with subdural versus subarachnoid hemorrhage.   2.  Changes of atrophy and small vessel ischemia.      DX-SHOULDER 1 VIEW LEFT   Final Result      No evidence of left shoulder dislocation.      DX-SHOULDER 2+ LEFT   Final Result      The humeral head is projected superiorly and posteriorly and the Y-view. This can be positional in nature or secondary to the posterior dislocation. Axillary views recommended for further evaluation.      DX-CHEST-PORTABLE (1 VIEW)   Final Result      Bilateral interstitial prominence. No focal consolidation.      CT-HEAD W/O   Final Result      Left tentorium small acute subdural hematoma.      Nonspecific white matter changes and cerebral volume loss.      Findings were discussed with ARJUN THORNE on 5/2/2018 4:53 PM.          Problem and Plan:       #Subdural hematoma  -Small in the L tentorium on CT head in ED . Repeat CT head showed not worsening   -Ground-level fall 2 days ago.  Intermittently confused. Family not available this AM for further hx  -on admission per notes, exam showed R side weakness and dysarthria. Strength now equal bilaterally and no dysarthria  -Neurosurgery following and reviewed CT scans. No changes in hematoma, OK to transfer to floor per note. Hold antiplt  -SCD for DVT prophylaxis   -BP goal <140/90  -labetalol PRN  -Keppra started for seizure prophylaxis, will contact neurosurgery for duration     #Elevated CPK  #MARISELA- Resolved  -CPK 1089 down trending  -Cr baseline 0.8, on admission 1.4. Resolved.  -Given 500 mL normal saline in the ED  -BP stable, Tachycardic 90-100s, not requiring oxygen - EKG SR  -Denies CP, SOB, palpitaions  -continue NS  -UA shows 10-20 WBC, leuk esterase, no bacteria or nitrites. Asymptomatic, WBC improving, will not start abx      #Ground Level Fall  - complaining of weakness, PT/OT following  - SNF ordered placed. She lives alone, agreeable to SNF     #Dysmetria - right sided neglect  #Right upper ext weakness   -on admission, resolved 5/3 AM, no dysarthria  -carotid doppler shows <50% stenosis bilaterally   -no DVT on LE US  -ECHO shows EF 65%, no wall motion abnormality, no significant valvular disease. Grade II diastolic dysfunction  -consider MRI    #Chronic diastolic heart failure  -Per echo this admission, grade 2 dysfunction. Compensated, continue to follow.     #Hypokalemia  -K 3.5 on admission. Replete and monitor     #Elevated troponin  -Trop 0.14--0.11--0.07  -No CP. Likely related to muscle injury, MARISELA     #Hypertension   -Has not been taking meds for 1 to 2 days and BP is in the 110s/50s  -Hold amlodipine and benazepril for now  -labetalol PRN     #Tobacco use - half pack a day for 60 years - no desire to quit - nicotine patch  #Osteopenia - on calcium and vitamin D       DISPO: Transfer to neurosurg     CODE STATUS: Full    Quality Measures:  Arias Catheter: none  DVT Prophylaxis: SCD  Ulcer Prophylaxis: none  Antibiotics:  none  Lines: PIV

## 2018-05-05 NOTE — CARE PLAN
Problem: Pain Management  Goal: Pain level will decrease to patient's comfort goal  Outcome: PROGRESSING AS EXPECTED    Intervention: Follow pain managment plan developed in collaboration with patient and Interdisciplinary Team  Tylenol [id number]

## 2018-05-06 PROBLEM — N17.9 AKI (ACUTE KIDNEY INJURY) (HCC): Status: ACTIVE | Noted: 2018-05-06
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
## 2018-05-06 PROBLEM — S06.5XAA SUBDURAL HEMATOMA (HCC): Status: ACTIVE | Noted: 2018-05-06

## 2018-05-06 PROBLEM — R74.8 ELEVATED CPK: Status: ACTIVE | Noted: 2018-05-06

## 2018-05-06 LAB
ANION GAP SERPL CALC-SCNC: 5 MMOL/L (ref 0–11.9)
BUN SERPL-MCNC: 17 MG/DL (ref 8–22)
CALCIUM SERPL-MCNC: 9.2 MG/DL (ref 8.5–10.5)
CHLORIDE SERPL-SCNC: 108 MMOL/L (ref 96–112)
CO2 SERPL-SCNC: 29 MMOL/L (ref 20–33)
CREAT SERPL-MCNC: 0.67 MG/DL (ref 0.5–1.4)
ERYTHROCYTE [DISTWIDTH] IN BLOOD BY AUTOMATED COUNT: 45.3 FL (ref 35.9–50)
GLUCOSE SERPL-MCNC: 110 MG/DL (ref 65–99)
HCT VFR BLD AUTO: 45 % (ref 37–47)
HGB BLD-MCNC: 15.1 G/DL (ref 12–16)
MAGNESIUM SERPL-MCNC: 1.7 MG/DL (ref 1.5–2.5)
MCH RBC QN AUTO: 31.1 PG (ref 27–33)
MCHC RBC AUTO-ENTMCNC: 33.6 G/DL (ref 33.6–35)
MCV RBC AUTO: 92.8 FL (ref 81.4–97.8)
PLATELET # BLD AUTO: 163 K/UL (ref 164–446)
PMV BLD AUTO: 11.2 FL (ref 9–12.9)
POTASSIUM SERPL-SCNC: 3.5 MMOL/L (ref 3.6–5.5)
RBC # BLD AUTO: 4.85 M/UL (ref 4.2–5.4)
SODIUM SERPL-SCNC: 142 MMOL/L (ref 135–145)
WBC # BLD AUTO: 8.8 K/UL (ref 4.8–10.8)

## 2018-05-06 PROCEDURE — 700102 HCHG RX REV CODE 250 W/ 637 OVERRIDE(OP): Performed by: INTERNAL MEDICINE

## 2018-05-06 PROCEDURE — 700102 HCHG RX REV CODE 250 W/ 637 OVERRIDE(OP): Performed by: STUDENT IN AN ORGANIZED HEALTH CARE EDUCATION/TRAINING PROGRAM

## 2018-05-06 PROCEDURE — A9270 NON-COVERED ITEM OR SERVICE: HCPCS | Performed by: INTERNAL MEDICINE

## 2018-05-06 PROCEDURE — 99232 SBSQ HOSP IP/OBS MODERATE 35: CPT | Mod: GC | Performed by: INTERNAL MEDICINE

## 2018-05-06 PROCEDURE — 36415 COLL VENOUS BLD VENIPUNCTURE: CPT

## 2018-05-06 PROCEDURE — A9270 NON-COVERED ITEM OR SERVICE: HCPCS | Performed by: STUDENT IN AN ORGANIZED HEALTH CARE EDUCATION/TRAINING PROGRAM

## 2018-05-06 PROCEDURE — 80048 BASIC METABOLIC PNL TOTAL CA: CPT

## 2018-05-06 PROCEDURE — 85027 COMPLETE CBC AUTOMATED: CPT

## 2018-05-06 PROCEDURE — 770001 HCHG ROOM/CARE - MED/SURG/GYN PRIV*

## 2018-05-06 PROCEDURE — 83735 ASSAY OF MAGNESIUM: CPT

## 2018-05-06 RX ORDER — POTASSIUM CHLORIDE 20 MEQ/1
40 TABLET, EXTENDED RELEASE ORAL ONCE
Status: COMPLETED | OUTPATIENT
Start: 2018-05-06 | End: 2018-05-06

## 2018-05-06 RX ADMIN — POTASSIUM CHLORIDE 40 MEQ: 1500 TABLET, EXTENDED RELEASE ORAL at 08:40

## 2018-05-06 RX ADMIN — METOPROLOL TARTRATE 25 MG: 25 TABLET, FILM COATED ORAL at 21:05

## 2018-05-06 RX ADMIN — STANDARDIZED SENNA CONCENTRATE AND DOCUSATE SODIUM 2 TABLET: 8.6; 5 TABLET, FILM COATED ORAL at 08:40

## 2018-05-06 RX ADMIN — METOPROLOL TARTRATE 25 MG: 25 TABLET, FILM COATED ORAL at 10:12

## 2018-05-06 RX ADMIN — NICOTINE 14 MG: 14 PATCH, EXTENDED RELEASE TRANSDERMAL at 05:06

## 2018-05-06 RX ADMIN — LEVETIRACETAM 500 MG: 100 SOLUTION ORAL at 08:40

## 2018-05-06 RX ADMIN — LEVETIRACETAM 500 MG: 100 SOLUTION ORAL at 22:37

## 2018-05-06 RX ADMIN — STANDARDIZED SENNA CONCENTRATE AND DOCUSATE SODIUM 2 TABLET: 8.6; 5 TABLET, FILM COATED ORAL at 21:05

## 2018-05-06 RX ADMIN — ACETAMINOPHEN 1000 MG: 500 TABLET ORAL at 02:28

## 2018-05-06 RX ADMIN — TIMOLOL MALEATE 1 DROP: 5 SOLUTION OPHTHALMIC at 08:39

## 2018-05-06 RX ADMIN — BRIMONIDINE TARTRATE 1 DROP: 2 SOLUTION OPHTHALMIC at 08:39

## 2018-05-06 ASSESSMENT — PAIN SCALES - GENERAL
PAINLEVEL_OUTOF10: 4
PAINLEVEL_OUTOF10: 3
PAINLEVEL_OUTOF10: 6
PAINLEVEL_OUTOF10: 3
PAINLEVEL_OUTOF10: 3

## 2018-05-06 ASSESSMENT — PATIENT HEALTH QUESTIONNAIRE - PHQ9
1. LITTLE INTEREST OR PLEASURE IN DOING THINGS: NOT AT ALL
SUM OF ALL RESPONSES TO PHQ9 QUESTIONS 1 AND 2: 0
2. FEELING DOWN, DEPRESSED, IRRITABLE, OR HOPELESS: NOT AT ALL

## 2018-05-06 NOTE — CARE PLAN
Problem: Safety  Goal: Will remain free from injury  Fall, aspiration and seizure precautions in place.    Problem: Knowledge Deficit  Goal: Knowledge of disease process/condition, treatment plan, diagnostic tests, and medications will improve  Pt updated on plan of care.    Problem: Pain Management  Goal: Pain level will decrease to patient's comfort goal  Routine pain assessment performed and appropriate pain management interventions implemented.

## 2018-05-06 NOTE — PROGRESS NOTES
Assumed care at 0700. Receive report from Night RN. Assessment completed, see Chart. Medications administered, see MAR. Needs met, no further at this time. Call light in place and reinforced education on its use. Will continue to monitor.

## 2018-05-06 NOTE — PROGRESS NOTES
Assumed pt care at 1910.  Received report from day RN.  Assessment completed.  Pt AOx4.  Pt comlaints of pain at this time in 3/10 generalized pain , continues with pain management as ordered.  No other s/s of discomfort or distress. Pt unable to ambulate  As per reported from AM staff. Skin intact/ slightly red on bottom with mepliex intact on bottom. Bed in lowest position, locked, and bed alarm on . Pt educated on safety and POC, states understanding.  Pt calls appropriately.  Treaded socks in place, SCDs on, call light within reach and staff numbers provided.  Pt needs met at this time.

## 2018-05-06 NOTE — THERAPY
Attempting treatment to further assess discharge recommendations; pt refusing to participate and unwilling to open eyes. Reports that her daughter came in and gave her a sponge bath. In discussion with family patient lives alone. Pt reports to this therapist that she does not ambulate but rather crawls around her home and has been urinating in bed rather than going to the bathroom.      Pt refused treatment.     Zuleyma Staton PT, [phone]

## 2018-05-06 NOTE — PROGRESS NOTES
Pt A&Ox4, denies numbness and tingling, pain is 3/10 (pain medication given).    Bed alarm on, call light within reach, pt educated on importance of using the call light when she needs assistance, pt verbalized understanding.    No changes from assessment this am.

## 2018-05-06 NOTE — PROGRESS NOTES
Internal Medicine Interval Note  Note Author: Yasemin Garrett M.D.     Name Hilda Blake 1940   Age/Sex 77 y.o. female   MRN [id number]   Code Status FULL CODE     After 5PM or if no immediate response to page, please call for cross-coverage  Attending/Team: Dr. Spencer See Patient List for primary contact information  Call [phone] to page    1st Call - Day Intern (R1):   Dr. Garrett 2nd Call - Day Sr. Resident (R2/R3):   Dr. Atwood         Reason for interval visit  (Principal Problem)   s/p GLF  Subdural hematoma, stable  Physical deconditioning/debility    Interval Problem Daily Status Update  (24 hours)   -no complaints this morning  -still significantly weak    ROS   Constitutional: Negative for chills and fever.   HENT: Negative for speech/swallow changes.  Negative for sore throat.   Eyes: Negative for acute vision changes, eye pain.    Respiratory: Negative for SOB, cough.    Cardiovascular: Negative for palpitations, chest pain.    Gastrointestinal: Negative for n/v/d, abd pain.    Genitourinary: Negative for dysuria, hematuria.    Musculoskeletal: did not c/o L shoulder pain this morning  Skin: Negative.  Negative for rash.   Neurological: Positive for weakness. Negative for dizziness, tingling, tremors, sensory change, speech change, focal weakness and headaches.        Generalized weakness       Consultants/Specialty  Neurosurgery   PMA: Kuhn    Disposition  Inpatient today; may discharge tomorrow or the next day    Quality Measures  Quality-Core Measures   Reviewed items::  Labs reviewed and Medications reviewed  Arias catheter::  No Arias  DVT prophylaxis pharmacological::  Contraindicated - High bleeding risk  DVT prophylaxis - mechanical:  SCDs  Assessed for rehabilitation services:  Patient was assess for and/or received rehabilitation services during this hospitalization          Physical Exam       Vitals:    18 1800 18 2000 18 0400 18 0800   BP:  (!) 163/66 126/59 132/58 125/57   Pulse: (!) 58 80 70 78   Resp: 18 17 18 18   Temp: 36.8 °C (98.3 °F) 36.4 °C (97.5 °F) 36.6 °C (97.9 °F) 37.1 °C (98.7 °F)   SpO2:   96% 95%   Weight:       Height:         Body mass index is 19.79 kg/m².    Oxygen Therapy:  Pulse Oximetry: 95 %, O2 (LPM): 2, O2 Delivery: Nasal Cannula    Physical Exam   Constitutional: She is oriented to person, place, and time and well-developed, well-nourished, and in no distress. No distress.   HENT:   Head: Normocephalic and atraumatic.   Eyes: Conjunctivae and EOM are normal. Right eye exhibits no discharge. Left eye exhibits no discharge. No scleral icterus.   Chronic R-sided ptosis   Cardiovascular: Normal rate, regular rhythm and normal heart sounds.  Exam reveals no gallop and no friction rub.    No murmur heard.  Pulmonary/Chest: Breath sounds normal. No respiratory distress. She has no wheezes. She exhibits no tenderness.   Abdominal: Soft. Bowel sounds are normal. She exhibits no distension. There is no tenderness. There is no rebound and no guarding.   Musculoskeletal: She exhibits no edema, tenderness or deformity.   Significant deconditioning   Neurological: She is alert and oriented to person, place, and time. No cranial nerve deficit.   Skin: Skin is warm and dry. No rash noted. She is not diaphoretic. No erythema. No pallor.   Psychiatric: Mood, memory, affect and judgment normal.         Lab Data Review:         5/6/2018  10:04 AM    Recent Labs      05/04/18 0548  05/06/18   0235   SODIUM  141  142   POTASSIUM  4.0  3.5*   CHLORIDE  111  108   CO2  23  29   BUN  28*  17   CREATININE  0.71  0.67   MAGNESIUM  1.7  1.7   CALCIUM  8.3*  9.2       Recent Labs      05/04/18 0548  05/06/18   0235   ALTSGPT  23   --    ASTSGOT  29   --    ALKPHOSPHAT  60   --    TBILIRUBIN  1.4   --    GLUCOSE  104*  110*       Recent Labs      05/04/18 0548  05/06/18   0235   RBC  5.03  4.85   HEMOGLOBIN  16.0  15.1   HEMATOCRIT  48.5*  45.0    PLATELETCT  128*  163*       Recent Labs      05/04/18   0548  05/06/18   0235   WBC  11.8*  8.8   NEUTSPOLYS  73.20*   --    LYMPHOCYTES  12.30*   --    MONOCYTES  12.40   --    EOSINOPHILS  1.20   --    BASOPHILS  0.40   --    ASTSGOT  29   --    ALTSGPT  23   --    ALKPHOSPHAT  60   --    TBILIRUBIN  1.4   --            Assessment/Plan     #Subdural hematoma  -Small in the L tentorium on CT head in ED . Repeat CT head showed not worsening.  -Secondary to GLF for which she was admitted.   -on admission per notes, exam showed R side weakness and dysarthria. Strength now equal bilaterally and no dysarthria; however, pt does demonstrate significant debility.   -Discharge pending PT. Patient prefers to go home. Family at bedside today and states she lives alone.  -Neurosurgery now signed off; recommend f/u with neurosurg 2 weeks from discharge and f/u CT scan in 1 month.  -SCD for DVT prophylaxis   -BP goal <140/90; currently at goal on home metoprolol with home amlodipine and benazapril held. However, was hypertensive overnight so now may start re-adding home antihypertensives if elevated (home meds: benazapril 20 mg, amlodipine 5 mg)  -hydralazine PRN  -Keppra started for seizure prophylaxis, will contact neurosurgery for duration prior to D/C     #Elevated CPK  #MARISELA- Resolved  -CPK 1089 down trending; as pt not going until at least tomorrow will get repeat for am  -Cr baseline 0.8, on admission 1.4. Resolved.  -Given 500 mL normal saline in the ED  -BP stable, Tachycardic 90-100s, not requiring oxygen - EKG SR  -Denies CP, SOB, palpitaions  -NS d/c'ed prior to transfer; pt drinking well; if CK still significantly elevated tomorrow am may give bolus  -UA shows 10-20 WBC, leuk esterase, no bacteria or nitrites. Asymptomatic, WBC improving, will not start abx      #Ground Level Fall  - complaining of weakness, PT/OT following  - SNF ordered placed; however pt wants to go home instead. She lives alone, so may not be  safe. Will discuss further tomorrow; pt not currently safe to d/c given debility     #Dysmetria - right sided neglect  #Right upper ext weakness   -on admission, resolved 5/3 AM, no dysarthria  -carotid doppler shows <50% stenosis bilaterally   -no DVT on LE US  -ECHO shows EF 65%, no wall motion abnormality, no significant valvular disease. Grade II diastolic dysfunction  -consider MRI     #Chronic diastolic heart failure  -Per echo this admission, grade 2 dysfunction. Compensated, CTM     #Hypokalemia  -K 3.5 on admission, and was 3.5 again today.  - CTM, replete PRN     #Elevated troponin  -Trop 0.14--0.11--0.07  -No CP. Likely related to muscle injury, MARISELA     #Hypertension   -Has not been taking meds for 1 to 2 days and BP is in the 110s/50s  -Hold amlodipine and benazepril for now  -labetalol PRN     #Tobacco use - half pack a day for 60 years - no desire to quit - nicotine patch  #Osteopenia - on calcium and vitamin D

## 2018-05-07 ENCOUNTER — PATIENT OUTREACH (OUTPATIENT)
Dept: HEALTH INFORMATION MANAGEMENT | Facility: OTHER | Age: 78
End: 2018-05-07

## 2018-05-07 VITALS
TEMPERATURE: 98.9 F | BODY MASS INDEX: 19.68 KG/M2 | HEART RATE: 57 BPM | DIASTOLIC BLOOD PRESSURE: 64 MMHG | RESPIRATION RATE: 18 BRPM | SYSTOLIC BLOOD PRESSURE: 143 MMHG | WEIGHT: 115.3 LBS | OXYGEN SATURATION: 91 % | HEIGHT: 64 IN

## 2018-05-07 LAB
ANION GAP SERPL CALC-SCNC: 7 MMOL/L (ref 0–11.9)
BASOPHILS # BLD AUTO: 0.4 % (ref 0–1.8)
BASOPHILS # BLD: 0.03 K/UL (ref 0–0.12)
BUN SERPL-MCNC: 22 MG/DL (ref 8–22)
CALCIUM SERPL-MCNC: 9.1 MG/DL (ref 8.5–10.5)
CHLORIDE SERPL-SCNC: 107 MMOL/L (ref 96–112)
CK SERPL-CCNC: 73 U/L (ref 0–154)
CO2 SERPL-SCNC: 28 MMOL/L (ref 20–33)
CREAT SERPL-MCNC: 0.56 MG/DL (ref 0.5–1.4)
EOSINOPHIL # BLD AUTO: 0.15 K/UL (ref 0–0.51)
EOSINOPHIL NFR BLD: 1.8 % (ref 0–6.9)
ERYTHROCYTE [DISTWIDTH] IN BLOOD BY AUTOMATED COUNT: 45 FL (ref 35.9–50)
GLUCOSE SERPL-MCNC: 112 MG/DL (ref 65–99)
HCT VFR BLD AUTO: 43 % (ref 37–47)
HGB BLD-MCNC: 14.5 G/DL (ref 12–16)
IMM GRANULOCYTES # BLD AUTO: 0.03 K/UL (ref 0–0.11)
IMM GRANULOCYTES NFR BLD AUTO: 0.4 % (ref 0–0.9)
LYMPHOCYTES # BLD AUTO: 1.46 K/UL (ref 1–4.8)
LYMPHOCYTES NFR BLD: 17.8 % (ref 22–41)
MCH RBC QN AUTO: 31.3 PG (ref 27–33)
MCHC RBC AUTO-ENTMCNC: 33.7 G/DL (ref 33.6–35)
MCV RBC AUTO: 92.7 FL (ref 81.4–97.8)
MONOCYTES # BLD AUTO: 1.05 K/UL (ref 0–0.85)
MONOCYTES NFR BLD AUTO: 12.8 % (ref 0–13.4)
NEUTROPHILS # BLD AUTO: 5.47 K/UL (ref 2–7.15)
NEUTROPHILS NFR BLD: 66.8 % (ref 44–72)
NRBC # BLD AUTO: 0 K/UL
NRBC BLD-RTO: 0 /100 WBC
PLATELET # BLD AUTO: 175 K/UL (ref 164–446)
PMV BLD AUTO: 10.9 FL (ref 9–12.9)
POTASSIUM SERPL-SCNC: 3.7 MMOL/L (ref 3.6–5.5)
PREALB SERPL-MCNC: 13 MG/DL (ref 18–38)
RBC # BLD AUTO: 4.64 M/UL (ref 4.2–5.4)
SODIUM SERPL-SCNC: 142 MMOL/L (ref 135–145)
WBC # BLD AUTO: 8.2 K/UL (ref 4.8–10.8)

## 2018-05-07 PROCEDURE — A9270 NON-COVERED ITEM OR SERVICE: HCPCS | Performed by: INTERNAL MEDICINE

## 2018-05-07 PROCEDURE — 99239 HOSP IP/OBS DSCHRG MGMT >30: CPT | Mod: GC | Performed by: HOSPITALIST

## 2018-05-07 PROCEDURE — 36415 COLL VENOUS BLD VENIPUNCTURE: CPT

## 2018-05-07 PROCEDURE — 700102 HCHG RX REV CODE 250 W/ 637 OVERRIDE(OP): Performed by: INTERNAL MEDICINE

## 2018-05-07 PROCEDURE — 84134 ASSAY OF PREALBUMIN: CPT

## 2018-05-07 PROCEDURE — 700102 HCHG RX REV CODE 250 W/ 637 OVERRIDE(OP): Performed by: STUDENT IN AN ORGANIZED HEALTH CARE EDUCATION/TRAINING PROGRAM

## 2018-05-07 PROCEDURE — A9270 NON-COVERED ITEM OR SERVICE: HCPCS | Performed by: STUDENT IN AN ORGANIZED HEALTH CARE EDUCATION/TRAINING PROGRAM

## 2018-05-07 PROCEDURE — 80048 BASIC METABOLIC PNL TOTAL CA: CPT

## 2018-05-07 PROCEDURE — 82550 ASSAY OF CK (CPK): CPT

## 2018-05-07 PROCEDURE — 85025 COMPLETE CBC W/AUTO DIFF WBC: CPT

## 2018-05-07 RX ORDER — LEVETIRACETAM 100 MG/ML
500 SOLUTION ORAL EVERY 12 HOURS
Qty: 300 ML | Refills: 0 | Status: SHIPPED | OUTPATIENT
Start: 2018-05-07 | End: 2019-01-11

## 2018-05-07 RX ORDER — NICOTINE 21 MG/24HR
1 PATCH, TRANSDERMAL 24 HOURS TRANSDERMAL EVERY 24 HOURS
Qty: 30 PATCH | Refills: 0 | Status: SHIPPED | OUTPATIENT
Start: 2018-05-07 | End: 2019-01-11

## 2018-05-07 RX ORDER — FOLIC ACID 1 MG/1
1 TABLET ORAL
Qty: 30 TAB | Refills: 0 | Status: SHIPPED | OUTPATIENT
Start: 2018-05-09 | End: 2019-01-11

## 2018-05-07 RX ORDER — ACETAMINOPHEN 500 MG
1000 TABLET ORAL EVERY 6 HOURS PRN
Qty: 30 TAB | Refills: 0 | Status: SHIPPED | OUTPATIENT
Start: 2018-05-07 | End: 2019-01-11

## 2018-05-07 RX ADMIN — VITAMIN D, TAB 1000IU (100/BT) 2000 UNITS: 25 TAB at 09:02

## 2018-05-07 RX ADMIN — FOLIC ACID 1 MG: 1 TABLET ORAL at 09:01

## 2018-05-07 RX ADMIN — TIMOLOL MALEATE 1 DROP: 5 SOLUTION OPHTHALMIC at 09:06

## 2018-05-07 RX ADMIN — BRIMONIDINE TARTRATE 1 DROP: 2 SOLUTION OPHTHALMIC at 09:06

## 2018-05-07 RX ADMIN — ACETAMINOPHEN 1000 MG: 500 TABLET ORAL at 00:30

## 2018-05-07 RX ADMIN — STANDARDIZED SENNA CONCENTRATE AND DOCUSATE SODIUM 2 TABLET: 8.6; 5 TABLET, FILM COATED ORAL at 09:02

## 2018-05-07 RX ADMIN — NICOTINE 14 MG: 14 PATCH, EXTENDED RELEASE TRANSDERMAL at 05:03

## 2018-05-07 RX ADMIN — METOPROLOL TARTRATE 25 MG: 25 TABLET, FILM COATED ORAL at 09:02

## 2018-05-07 RX ADMIN — LEVETIRACETAM 500 MG: 100 SOLUTION ORAL at 09:01

## 2018-05-07 ASSESSMENT — PAIN SCALES - GENERAL: PAINLEVEL_OUTOF10: 3

## 2018-05-07 NOTE — DISCHARGE PLANNING
Arranged patient's transport to [facility] at 1600 via [facility]. Ely(SRIRAM) and Murali(RN) notified of transport time.

## 2018-05-07 NOTE — DISCHARGE PLANNING
Medical Social Work    Referral:  COBRA    Intervention:  SW made tc to pt's dtr, Alis. Explained that pt was accepted to [facility] and would be transported at 1600. Alis was agreeable and will alert other family members as well.  Verbal authorization was given for COBRA    COBRA was placed in pt's chart    Plan:  SW will remain available for dc planning

## 2018-05-07 NOTE — DISCHARGE PLANNING
Medical Social Work    Screening ID:  [id number]     \[facility] [place] [facility]\""#:   [id number]

## 2018-05-07 NOTE — DISCHARGE SUMMARY
"        Internal Medicine Discharge Summary  Note Author: Yasemin Garrett M.D.       Admit Date:  5/2/2018       Discharge Date:   5/7/2018    Service:   [facility] Internal Medicine White Team  Attending Physician(s):   Dr. Wilson       Senior Resident(s):   Dr. Lauren  Rubin Resident(s):   Dr. Garrett      Primary Diagnosis:   Subdural Hematoma    Secondary Diagnoses:                Active Problems:    Subdural hematoma (HCC) POA: Yes    Fall POA: Yes    MARISELA (acute kidney injury) (HCC) POA: Yes    Elevated CPK POA: Yes  Resolved Problems:    * No resolved hospital problems. *      Hospital Summary (Brief Narrative):       Per ICU Transfer Summary Note by Dr. Boss:   \"76 y/o f with a pmh of HTN, DLD, osteopenia, breast CA (s/p mastectomy and radiation 2004), tobacco use brought to the ED by her family after she was found down. She was last seen at her baseline 4/30/18. States she was looking for her phone in a room filled with many objects, she tripped and fell and remembers hitting her head. States her legs \"gave out and didn't seem to want to work\" afterwards and wasn't able to stand for about 12 hours. She never lost consciousness but state although she remembered what happened her overall memory of the event is a little off. Both lower extremities bilaterally, no side was weaker than there other. No bleeding from the head. She had L shoulder pain, Xray in the ED shows no dislocation. She has ecchymosis on her R hip, weak but able to move all extremities. She was reported to have dysarthria on admission.  Labs on admission showed mild rhabdo and MARISELA. Started on IV fluids.   CT head showed small subdural hematoma, neurosurgery consutled and she was admitted to ICU.     \"On exam ICU day 1 no LE weakness equal and bilateral, no UE weakness or any other acute neuro deficits.  Chronic R eye ptosis. Repeat CT head shows no progression of bleed. Per neurosurgery fine to transfer out of ICU. Continue holding antiplatelets and " "anticoagulation. Carotid US shows <50% stenosis bilaterally, no DVT on LE US. MARISELA and rhabdo improving. She has leukocytosis which is improving without abx, UA shows elevated WBC and leuk esterase, no bacteria or nitrites, asymptomatic, afebrile, hemodynamically stable. No abx started.\"    Interval events:  -At time of transfer, Ms. Blake was no longer on IVF. CK level showed resolution of her rhabdomyolysis and Cr was wnl demonstrating resolution of her MARISELA. She has demonstrated no new focal deficits or worsening confusion (except brief transient confusion around the time of waking) or level of consciousness since arrival. However, she has significant generalized weakness and per PT assessment would benefit from transition to SNF for further strengthening prior to transfer home, particularly as she lives alone.     Patient /Hospital Summary (Details -- Problem Oriented) :          #Subdural hematoma  -Small in the L tentorium on CT head in ED . Repeat CT head showed not worsening.  -Secondary to GLF for which she was admitted.   -on admission per notes, exam showed R side weakness and dysarthria. Strength now equal bilaterally and no dysarthria; however, pt does demonstrate significant deconditioning.   -Neurosurgery now signed off; recommend f/u with neurosurg 2 weeks from discharge and f/u CT scan in 1 month.  -SCD for DVT prophylaxis   -BP goal <140/90; currently at goal on home metoprolol with home amlodipine and benazapril held. Should her BP begin rising above where it has currently been averaging (120s-130s/50s), recommend restarting home antihypertensives (home meds: benazapril 20 mg, amlodipine 5 mg)  -Keppra started for seizure prophylaxis and will be continued at discharge  -To see neurosurgery outpatient in 2 weeks and get CT scan in one month as ordered by neurosurgery     #Elevated CPK - Resolved  #MARISELA- Resolved  -CPK now normal down from >1000  -Cr baseline 0.8, on admission 1.4. Now " resolved.  -Given 500 mL normal saline in the ED  -BP stable, Tachycardic 90-100s, not requiring oxygen - EKG SR  -Denies CP, SOB, palpitaions  -NS d/c'ed prior to transfer; pt drinking well; if CK still significantly elevated tomorrow am may give bolus  -UA shows 10-20 WBC, leuk esterase, no bacteria or nitrites. Asymptomatic, WBC improving, will not start abx      #Ground Level Fall  - complaining of weakness, PT/OT following  - discharging to SNF today for further strengthening per PT recs     #Dysmetria - right sided neglect - Resolved  #Right upper ext weakness - Resolved (now diffusely weak)  -on admission, resolved by 5/3/18 -- TIA a possibility given complete resolution   -carotid doppler shows <50% stenosis bilaterally   -no DVT on LE US  -ECHO shows EF 65%, no wall motion abnormality, no significant valvular disease. Grade II diastolic dysfunction    #Chronic diastolic heart failure  -Per echo this admission, grade 2 dysfunction. Compensated, CTM     #Hypokalemia  - K 3.5 on admission, up to wnl at 3.7 today.     #Elevated troponin  -Trop 0.14--0.11--0.07  -No CP. Likely related to muscle injury, MARISELA     #Hypertension   -Holding amlodipine and benazepril for now and continuing metoprolol given BP has been averaging 120s-130s/50s and goal is <140/90. If continues to rise consider restarting home meds.  -labetalol PRN     #Tobacco use - half pack a day for 60 years - no desire to quit - nicotine patches prescribed    #Osteopenia - on calcium and vitamin D     Consultants:     Neurosurgery   PMA: [facility]  PT/OT    Procedures:        N/A    Imaging/ Testing:      CAROTID DUPLEX   Final Result      LE VENOUS DUPLEX (DVT)   Final Result      ECHOCARDIOGRAM COMP W/O CONT   Final Result      CT-HEAD W/O   Final Result         1.  Hyperdensity along the left tentorium, stable since prior, compatible with subdural versus subarachnoid hemorrhage.   2.  Changes of atrophy and small vessel ischemia.       DX-SHOULDER 1 VIEW LEFT   Final Result      No evidence of left shoulder dislocation.      DX-SHOULDER 2+ LEFT   Final Result      The humeral head is projected superiorly and posteriorly and the Y-view. This can be positional in nature or secondary to the posterior dislocation. Axillary views recommended for further evaluation.      DX-CHEST-PORTABLE (1 VIEW)   Final Result      Bilateral interstitial prominence. No focal consolidation.      CT-HEAD W/O   Final Result      Left tentorium small acute subdural hematoma.      Nonspecific white matter changes and cerebral volume loss.      Findings were discussed with ARJUN THORNE on 5/2/2018 4:53 PM.            Discharge Medications:         Medication Reconciliation: Completed       Medication List      START taking these medications      Instructions   acetaminophen 500 MG Tabs  Commonly known as:  TYLENOL   Take 2 Tabs by mouth every 6 hours as needed for Mild Pain.  Dose:  1000 mg     levETIRAcetam 100 MG/ML Soln  Commonly known as:  KEPPRA   Take 5 mL by mouth every 12 hours.  Dose:  500 mg     * nicotine 14 MG/24HR Pt24  Commonly known as:  NICODERM   Apply 1 Patch to skin as directed every 24 hours.  Dose:  1 Patch     * nicotine 7 MG/24HR Pt24  Commonly known as:  NICODERM   Doctor's comments:  To be taken after completion of 14 mg patches for taper. Do not smoke while using patches  Apply 1 Patch to skin as directed every 24 hours.  Dose:  1 Patch        * This list has 2 medication(s) that are the same as other medications prescribed for you. Read the directions carefully, and ask your doctor or other care provider to review them with you.            CHANGE how you take these medications      Instructions   folic acid 1 MG Tabs  Start taking on:  5/9/2018  What changed:  · medication strength  · how much to take  · when to take this  · additional instructions  Commonly known as:  FOLVITE   Take 1 Tab by mouth every Monday, Wednesday, and Friday.  Dose:   1 mg        CONTINUE taking these medications      Instructions   COMBIGAN 0.2-0.5 % Soln  Generic drug:  Brimonidine Tartrate-Timolol   Place 1 Drop in both eyes 2 Times a Day.  Dose:  1 Drop     latanoprost 0.005 % Soln  Commonly known as:  XALATAN   Place 1 Drop in both eyes every day.  Dose:  1 Drop     metoprolol 25 MG Tabs  Commonly known as:  LOPRESSOR   Doctor's comments:  Pt to make appt prior to more refills.  Take 1 Tab by mouth 2 times a day.  Dose:  25 mg     prednisoLONE acetate 1 % Susp  Commonly known as:  PRED FORTE   Place 1 Drop in right eye 4 times a day.  Dose:  1 Drop     * Vitamin D 2000 units Caps   Take 1 Cap by mouth 3 times a week. MON,WED,FRI  Dose:  1 Cap     * vitamin D 1000 UNIT Tabs  Commonly known as:  cholecalciferol   Take 1,000 Units by mouth every day. supplement  Dose:  1000 Units        * This list has 2 medication(s) that are the same as other medications prescribed for you. Read the directions carefully, and ask your doctor or other care provider to review them with you.            STOP taking these medications    amLODIPine 5 MG Tabs  Commonly known as:  NORVASC     aspirin 81 MG EC tablet     benazepril 20 MG Tabs  Commonly known as:  LOTENSIN     ibuprofen 200 MG Tabs  Commonly known as:  MOTRIN            Can use .DISCHARGEMEDSLIST if going to another facility         Disposition:   To SNF    Diet:   Regular    Activity:   With PT assist/ambulatory assistance aids    Instructions:      Pt discharging to SNF for further strenghtening w/ PT/OT for her generalized weakness and deconditioning. We recommend goal BP <140/90; she has been averaging 120s-130s/50s. Should her BP increase we recommend slowly reintroducing her home antihypertension medications (in addition to metoprolol which she currently is on, home meds also include benazepril and amlodipine).  The patient was instructed to return to the ER in the event of worsening symptoms. I have counseled the patient on the  "importance of compliance and the patient has agreed to proceed with all medical recommendations and follow up plan indicated above.   The patient understands that all medications come with benefits and risks. Risks may include permanent injury or death and these risks can be minimized with close reassessment and monitoring.        Primary Care Provider:    Barbara Pyle P.A.-C.  Discharge summary faxed to primary care provider:  Completed  Copy of discharge summary given to the patient: Deferred      Follow up appointment details :      -To SNF  -F/u with neurosurgery 2 weeks from discharge  -F/u CT head non-contrast 4 weeks from discharge    Pending Studies:        N/A    Time spent on discharge day patient visit, preparing discharge paperwork and arranging for patient follow up.    Summary of follow up issues:   We recommend goal BP <140/90; she has been averaging 120s-130s/50s. Should her BP increase we recommend slowly reintroducing her home antihypertension medications (in addition to metoprolol which she currently is on, home meds also include benazepril and amlodipine).    We recommend safe discharge plan from SNF either to home with home health and/or mobility aids, or to  longterm care pending ongoing assessment of her strength level and mental status.    Discharge Time (Minutes) :    30  Hospital Course Type:  Inpatient Stay >2 midnights      Condition on Discharge  Stable  ______________________________________________________________________    Interval history/exam for day of discharge:    Pt initially confused this morning on first awakening. On return later this transient confusion had improved to normal. She has no complaints this morning and feels well, though she admits ongoing weakness. Reports that she was able to \"sit up for 15 minutes\" yesterday as evidence of her improving strength. Amenable to SNF.     Vitals:    05/06/18 1600 05/06/18 2000 05/07/18 0400 05/07/18 0745   BP: 158/55 128/54 " 126/55 152/55   Pulse: 62 81 87 60   Resp: 18 17 17 17   Temp: 36.8 °C (98.3 °F) 36.7 °C (98.1 °F) 36.9 °C (98.5 °F) 36.6 °C (97.8 °F)   SpO2: 99% 95% 96% 97%   Weight:       Height:         Weight/BMI: Body mass index is 19.79 kg/m².  Pulse Oximetry: 97 %, O2 (LPM): 0, O2 Delivery: None (Room Air)    Constitutional: She is oriented to person, place, and time and well-developed, well-nourished, and in no distress. No distress.   HENT:   Head: Normocephalic and atraumatic.   Eyes: Conjunctivae and EOM are normal. Right eye exhibits no discharge. Left eye exhibits no discharge. No scleral icterus.   Chronic R-sided ptosis   Cardiovascular: Normal rate, regular rhythm and normal heart sounds.  Exam reveals no gallop and no friction rub.    No murmur heard.  Pulmonary/Chest: Breath sounds normal. No respiratory distress. She has no wheezes. She exhibits no tenderness.   Abdominal: Soft. Bowel sounds are normal. She exhibits no distension. There is no tenderness. There is no rebound and no guarding.   Musculoskeletal: She exhibits no edema, tenderness or deformity.   Significant deconditioning   Neurological: She is alert and oriented to person, place, and time. No cranial nerve deficit.   Skin: Skin is warm and dry. No rash noted. She is not diaphoretic. No erythema. No pallor.   Psychiatric: Mood, memory, affect and judgment normal.        Most Recent Labs:    Lab Results   Component Value Date/Time    WBC 8.2 05/07/2018 03:49 AM    WBC 7.5 11/19/2009 07:41 AM    RBC 4.64 05/07/2018 03:49 AM    RBC 4.89 11/19/2009 07:41 AM    HEMOGLOBIN 14.5 05/07/2018 03:49 AM    HEMATOCRIT 43.0 05/07/2018 03:49 AM    MCV 92.7 05/07/2018 03:49 AM    MCV 96 11/19/2009 07:41 AM    MCH 31.3 05/07/2018 03:49 AM    MCH 31.5 11/19/2009 07:41 AM    MCHC 33.7 05/07/2018 03:49 AM    MPV 10.9 05/07/2018 03:49 AM    NEUTSPOLYS 66.80 05/07/2018 03:49 AM    LYMPHOCYTES 17.80 (L) 05/07/2018 03:49 AM    MONOCYTES 12.80 05/07/2018 03:49 AM     EOSINOPHILS 1.80 05/07/2018 03:49 AM    BASOPHILS 0.40 05/07/2018 03:49 AM      Lab Results   Component Value Date/Time    SODIUM 142 05/07/2018 03:49 AM    POTASSIUM 3.7 05/07/2018 03:49 AM    CHLORIDE 107 05/07/2018 03:49 AM    CO2 28 05/07/2018 03:49 AM    GLUCOSE 112 (H) 05/07/2018 03:49 AM    BUN 22 05/07/2018 03:49 AM    CREATININE 0.56 05/07/2018 03:49 AM    CREATININE 0.97 04/14/2011 09:08 AM    BUNCREATRAT 18 04/14/2011 09:08 AM    GLOMRATE >59 11/19/2009 07:41 AM      Lab Results   Component Value Date/Time    ALTSGPT 23 05/04/2018 05:48 AM    ASTSGOT 29 05/04/2018 05:48 AM    ALKPHOSPHAT 60 05/04/2018 05:48 AM    TBILIRUBIN 1.4 05/04/2018 05:48 AM    DBILIRUBIN 0.3 06/05/2007 04:45 AM    LIPASE <3 (L) 05/02/2018 05:20 PM    ALBUMIN 3.2 05/04/2018 05:48 AM    GLOBULIN 2.5 05/04/2018 05:48 AM    PREALBUMIN 13.0 (L) 05/07/2018 03:49 AM    INR 1.14 (H) 05/02/2018 05:20 PM    MACROCYTOSIS 1+ 06/09/2007 05:00 AM     Lab Results   Component Value Date/Time    PROTHROMBTM 14.3 05/02/2018 05:20 PM    INR 1.14 (H) 05/02/2018 05:20 PM

## 2018-05-07 NOTE — DISCHARGE PLANNING
Received choice form from Bethanie([facility]). Referral sent to [facility].

## 2018-05-07 NOTE — DISCHARGE PLANNING
Medical Social Work    Referral:  DC Order/Summary    Intervention:  SRIRAM made tc to Dr. Garrett ([phone])  Notified of dc to [place] today at 1600. MD will input DC Summary.    Plan:  SRIRAM will remain available for dc planning    Updated @ 1332:  Transport form faxed to Keri WILSON    Bedside RN notified for transport time

## 2018-05-07 NOTE — CARE PLAN
Problem: Communication  Goal: The ability to communicate needs accurately and effectively will improve  Outcome: PROGRESSING SLOWER THAN EXPECTED      Problem: Safety  Goal: Will remain free from injury  Outcome: PROGRESSING SLOWER THAN EXPECTED      Problem: Knowledge Deficit  Goal: Knowledge of disease process/condition, treatment plan, diagnostic tests, and medications will improve  Outcome: PROGRESSING SLOWER THAN EXPECTED

## 2018-05-07 NOTE — WOUND TEAM
"[facility]  Inpatient Services  Initial Wound & Skin Care Evaluation    Admission Date:  5/2/2018   HPI, PMH, SH: Reviewed  Unit where seen by Wound Team: [id number]    WOUND CONSULT RELATED TO: pressure injury mgmt    SUBJECTIVE: \"I fell down.\"    Self Report / Pain Level: no c/o pain      OBJECTIVE: on pressure redistribution mattress, pt was able to turn self when asked  WOUND TYPE, LOCATION, CHARACTERISTICS (Pressure ulcers: location, stage, POA or date identified)  Pressure Ulcer  Deep Tissue Injury POA Sacrum Midline  Periwound Skin: Discolored  Drainage : None  Tissue Type and %:    100% purple  Wound Edges:    closed    Odor:     None   Exposed structure(s):   No   Signs and Symptoms of Infection: none    Pressure Ulcer  Stage 2 POA Shoulder Left Anterior  Periwound Skin: Pink  Drainage : None  Tissue Type and %:    100% reddish scab  Wound Edges:    closed    Odor:     None   Exposed structure(s):   No   Signs and Symptoms of Infection: none    Pressure Ulcer  Deep Tissue Injury POA Hip Right Lateral  Periwound Skin: Pink  Drainage : None  Tissue Type and %:    100% red/dark red  Wound Edges:    closed    Odor:     None   Exposed structure(s):   No   Signs and Symptoms of Infection: none    Pressure Ulcer  Stage I  POA Head Right Posterior;Lateral  Periwound Skin: Pink  Drainage : None  Tissue Type and %:    100% pink  Wound Edges:    closed    Odor:     None   Exposed structure(s):   No   Signs and Symptoms of Infection: none    Wound POA Abrasion Elbow L and R  Periwound Skin: Discolored  Drainage : None     Tissue Type and %:    100% dried blood scabs  Wound Edges:    closed    Odor:     None   Exposed structure(s):   No   Signs and Symptoms of Infection: none    Measurements: taken 5/6/18    Sacrum  L shoulder R hip Head  Largest elbow abrasion  Length (cm):  6  2  2 1  0.8   Width (cm):  7  3   2 1  0.5  Depth (cm):  (nm) (nm adherent scab) (both nm intact) (scab)      Tracts/undermining: "  All None     INTERVENTIONS BY WOUND TEAM: viewed and assessed bony prominences, placed mepilex drsg to sacrococcygeal area and applied honey colloid drsg with adhesive foam to L shoulder, Head, R hip and elbow abrasions MARCO ANTONIO  Sacrum Midline-Dressing Options: Mepilex  Shoulder Left Anterior-Dressing Options:  (honey colloid, foam)   Hip Right Lateral; Head Right Posterior;Lateral; R & L Elbow-Dressing Options: Open to Air    Interdisciplinary consultation:   With Nursing;With Patient    EVALUATION: pt has abrasions to both elbows. There is POA DTI to sacrococcygeal area and R hip, POA St 1 to head, POA St 2 with scab/crust to L shoulder.     Factors affecting wound healing: subdural hematoma, ground level fall, CHF, HTN, tobacco use  Goals: decreased wound size 1% each week      NURSING PLAN OF CARE ORDERS (X):    Dressing changes: See Dressing Maintenance orders: x  Skin care: See Skin Care orders: x  Rectal tube care: See Rectal Tube Care orders:   Other orders:    RSKIN: CURRENT (X) ORDERED (O)  Q shift Blayne:  X  Q shift pressure point assessments:  X  Pressure redistribution mattress  x      LENNY      Bariatric LENNY      Bariatric foam        Heel float boots       Heels floated on pillows      Barrier wipes      Barrier Cream      Barrier paste      Sacral silicone dressing  x    Padded O2 tubing x     Anchorfast      Trach with Optifoam split foam       Waffle cushion      Rectal tube or BMS      Antifungal tx    Turn q 2 hours x  Up to chair  Ambulate   PT/OT     Dietician      PO  x   TF   TPN     PVN    NPO   # days   Other       WOUND TEAM PLAN OF CARE (X):   NPWT change 3 x week:        Dressing changes by wound team:       Follow up as needed:  x     Other (explain):    Anticipated discharge plans (X):  SNF:           Home Care:           Outpatient Wound Center:            Self Care:            Other: tbd

## 2018-05-07 NOTE — DISCHARGE INSTRUCTIONS
Discharge Instructions    Discharged to other by medical transportation with escort. Discharged via wheelchair, hospital escort: Yes.  Special equipment needed: Not Applicable    Be sure to schedule a follow-up appointment with your primary care doctor or any specialists as instructed.     Discharge Plan:   Diet Plan: Discussed  Activity Level: Discussed  Smoking Cessation Offered: Patient Counseled  Confirmed Follow up Appointment: Appointment Scheduled  Confirmed Symptoms Management: Discussed  Medication Reconciliation Updated: Yes  Pneumococcal Vaccine Administered/Refused: Given (See MAR)  Influenza Vaccine Indication: Not indicated: Previously immunized this influenza season and > 8 years of age    I understand that a diet low in cholesterol, fat, and sodium is recommended for good health. Unless I have been given specific instructions below for another diet, I accept this instruction as my diet prescription.   Other diet: regular    Special Instructions: discharge to SNF    · Is patient discharged on Warfarin / Coumadin?   No     Depression / Suicide Risk    As you are discharged from this [facility] facility, it is important to learn how to keep safe from harming yourself.    Recognize the warning signs:  · Abrupt changes in personality, positive or negative- including increase in energy   · Giving away possessions  · Change in eating patterns- significant weight changes-  positive or negative  · Change in sleeping patterns- unable to sleep or sleeping all the time   · Unwillingness or inability to communicate  · Depression  · Unusual sadness, discouragement and loneliness  · Talk of wanting to die  · Neglect of personal appearance   · Rebelliousness- reckless behavior  · Withdrawal from people/activities they love  · Confusion- inability to concentrate     If you or a loved one observes any of these behaviors or has concerns about self-harm, here's what you can do:  · Talk about it- your feelings and  reasons for harming yourself  · Remove any means that you might use to hurt yourself (examples: pills, rope, extension cords, firearm)  · Get professional help from the community (Mental Health, Substance Abuse, psychological counseling)  · Do not be alone:Call your Safe Contact- someone whom you trust who will be there for you.  · Call your local CRISIS HOTLINE 568-2631 or 929-186-9449  · Call your local Children's Mobile Crisis Response Team [place] (555) 529-1807 or wwwCloudTalk  · Call the toll free National Suicide Prevention Hotlines   · National Suicide Prevention Lifeline 120-836-VSKF (4397)  · National CloudVolumes Line Network 800-SUICIDE (768-6680)      Subdural Hematoma  A subdural hematoma is a collection of blood between the brain and its tough outermost membrane covering (the dura).  Blood clots that form in this area push down on the brain and cause irritation. A subdural hematoma may cause parts of the brain to stop working and eventually cause death.   CAUSES  A subdural hematoma is caused by bleeding from a ruptured blood vessel (hemorrhage). The bleeding results from trauma to the head, such as from a fall or motor vehicle accident.  There are two types of subdural hemorrhages:  · Acute. This type develops shortly after a serious blow to the head and causes blood to collect very quickly. If not diagnosed and treated promptly, severe brain injury or death can occur.  · Chronic. This is when bleeding develops more slowly, over weeks or months.  RISK FACTORS  People at risk for subdural hematoma include older persons, infants, and alcoholics.  SYMPTOMS  An acute subdural hemorrhage develops over minutes to hours. Symptoms can include:  · Temporary loss of consciousness.  · Weakness of arms or legs on one side of the body.  · Changes in vision or speech.  · A severe headache.  · Seizures.  · Nausea and vomiting.  · Increased sleepiness.  A chronic subdural hemorrhage develops over weeks to  months. Symptoms may develop slowly and produce less noticeable problems or changes. Symptoms include:  · A mild headache.  · A change in personality.  · Loss of balance or difficulty walking.  · Weakness, numbness, or tingling in the arms or legs.  · Nausea or vomiting.  · Memory loss.  · Double vision.  · Increased sleepiness.  DIAGNOSIS  Your health care provider will perform a thorough physical and neurological exam. A CT scan or MRI may also be done. If there is blood on the scan, its color will help your health care provider determine how long the hemorrhage has been there.  TREATMENT  If the cause is an acute subdural hemorrhage, immediate treatment is needed. In many cases an emergency surgery is performed to drain accumulated blood or to remove the blood clot. Sometimes steroid or diuretic medicines or controlled breathing through a ventilator is needed to decrease pressure in the brain. This is especially true if there is any swelling of the brain.  If the cause is a chronic subdural hemorrhage, treatment depends on a variety of factors. Sometimes no treatment is needed. If the subdural hematoma is small and causes minimal or no symptoms, you may be treated with bed rest, medicines, and observation. If the hemorrhage is large or if you have neurological symptoms, an emergency surgery is usually needed to remove the blood clot.  People who develop a subdural hemorrhage are at risk of developing seizures, even after the subdural hematoma has been treated. You may be prescribed an anti-seizure (anticonvulsant) medicine for a year or longer.  HOME CARE INSTRUCTIONS  · Only take medicines as directed by your health care provider.  · Rest if directed by your health care provider.  · Keep all follow-up appointments with your health care provider.  · If you play a contact sport such as football, hockey or soccer and you experienced a significant head injury, allow enough time for healing (up to 15 days) before you  start playing again. A repeated injury that occurs during this fragile repair period is likely to result in hemorrhage. This is called the second impact syndrome.  SEEK IMMEDIATE MEDICAL CARE IF:  · You fall or experience minor trauma to your head and you are taking blood thinners. If you are on any blood thinners even a very small injury can cause a subdural hematoma. You should not hesitate to seek medical attention regardless of how minor you think your symptoms are.  · You experience a head injury and have:  ¨ Drowsiness or a decrease in alertness.  ¨ Confusion or forgetfulness.  ¨ Slurred speech.  ¨ Irrational or aggressive behavior.  ¨ Numbness or paralysis in any part of the body.  ¨ A feeling of being sick to your stomach (nauseous) or you throw up (vomit).  ¨ Difficulty walking or poor coordination.  ¨ Double vision.  ¨ Seizures.  ¨ A bleeding disorder.  ¨ A history of heavy alcohol use.  ¨ Clear fluid draining from your nose or ears.  ¨ Personality changes.  ¨ Difficulty thinking.  ¨ Worsening symptoms.  MAKE SURE YOU:  · Understand these instructions.  · Will watch your condition.  · Will get help right away if you are not doing well or get worse.  FOR MORE INFORMATION  National Stuart of Neurological Disorders and Stroke: www.ninds.nih.gov  American Association of Neurological Surgeons: www.neurosurgerytoday.org  American Academy of Neurology (AAN): www.aan.com  Brain Injury Association of [place]: www.biausa.org  This information is not intended to replace advice given to you by your health care provider. Make sure you discuss any questions you have with your health care provider.  Document Released: 11/04/2005 Document Revised: 10/08/2014 Document Reviewed: 06/20/2014  Elsevier Interactive Patient Education © 2017 [employer]vier Inc.

## 2018-05-07 NOTE — DISCHARGE PLANNING
Transitional Care Navigator:    TCN met with patient and talked to daughter over the phone to discuss transitional care services for discharge planning.  Pt was living alone but having difficulty taking care of self for past year.  Family plans or patient to go to care home after rehabilitation.  SNF level of care has been recommended.  TCN explained SNF level of care in detail including insurance coverage.  Pt/family in agreement.  Choice is [place] [facility], [facility]. TCN explained to patient that she needs to work with PT today to get a baseline functional status.  Pt in agreement and notes that she feels weak. Choice form completed and faxed to [facility].  SW aware.  TCN will follow-up as needed.

## 2018-05-07 NOTE — DOCUMENTATION QUERY
DOCUMENTATION QUERY    PROVIDERS: Please select “Cosign w/ note”to reply to query.    To better represent the severity of illness of your patient, please review the following information and exercise your independent professional judgment in responding to this query.     There is conflicting documentation in the electronic medical chart:  1. Traumatic rhabdomyolysis is documented in the ER Final impression  2. Rhabdomyolysis is documented in the Progress notes    Based upon the clinical findings, risk factors, and treatment, can a clarification be made?    • Rhabdomyolysis is traumatic  • Rhabdomyolysis is non-traumatic  • Other explanation of clinical findings  • Unable to determine    The medical record reflects the following:   Clinical Findings 5/2 Admit labs:  -CPK 1089  5/2 Neurosurgery consult:  -S/P fall  5/2 hospitalist note:  -Mild MARISELA, likely related to rhabdomyolysis  5/4 Hospitalist note:  -tripped and fell  -wasn't able to stand for about 12 hours   Treatment ICU monitoring  IVF infusion   Shlouder xr, cxr, CT head  Pulmonary consult   Risk Factors Found down after fall  Subdural hematoma  Acute kidney injury  HTN   Location within medical record History and Physical, Progress Notes and Lab Results      Thank you,   Tru Drummond RN BSN  Clinical   [phone] [facility] office  [phone] Cell phone

## 2018-05-07 NOTE — PROGRESS NOTES
"Patient refusing her eye drops. Patient stated \" I will talk to my doctor before I take them\". Patient is alert and oriented x4 but forgetful. Education given, Patient verbalized an understanding of the risks/benefits.   "

## 2018-05-07 NOTE — PROGRESS NOTES
Pt. Is AAOx3-4 occasionally forgets why she's at the hospital  Pt. Moves all extremeties,  Denies numbness and tingling  Complains of pain at head  Pt. Repositioned and given tylenol for pain  Pt. Up with two person assistance  Bed alarm on  SCD's on  Treaded socks in place  Call light within reach

## 2018-07-22 ENCOUNTER — APPOINTMENT (OUTPATIENT)
Dept: RADIOLOGY | Facility: MEDICAL CENTER | Age: 78
End: 2018-07-22
Attending: EMERGENCY MEDICINE
Payer: MEDICARE

## 2018-07-22 ENCOUNTER — HOSPITAL ENCOUNTER (EMERGENCY)
Facility: MEDICAL CENTER | Age: 78
End: 2018-07-22
Attending: EMERGENCY MEDICINE
Payer: MEDICARE

## 2018-07-22 VITALS
HEIGHT: 64 IN | SYSTOLIC BLOOD PRESSURE: 160 MMHG | TEMPERATURE: 98.1 F | RESPIRATION RATE: 16 BRPM | HEART RATE: 80 BPM | DIASTOLIC BLOOD PRESSURE: 90 MMHG | WEIGHT: 118.39 LBS | BODY MASS INDEX: 20.21 KG/M2 | OXYGEN SATURATION: 95 %

## 2018-07-22 DIAGNOSIS — N39.0 ACUTE UTI: ICD-10-CM

## 2018-07-22 DIAGNOSIS — R10.30 LOWER ABDOMINAL PAIN: Primary | ICD-10-CM

## 2018-07-22 LAB
ALBUMIN SERPL BCP-MCNC: 5.2 G/DL (ref 3.2–4.9)
ALBUMIN/GLOB SERPL: 1.6 G/DL
ALP SERPL-CCNC: 96 U/L (ref 30–99)
ALT SERPL-CCNC: 11 U/L (ref 2–50)
ANION GAP SERPL CALC-SCNC: 14 MMOL/L (ref 0–11.9)
APPEARANCE UR: CLEAR
AST SERPL-CCNC: 19 U/L (ref 12–45)
BACTERIA #/AREA URNS HPF: NEGATIVE /HPF
BASOPHILS # BLD AUTO: 0.4 % (ref 0–1.8)
BASOPHILS # BLD: 0.04 K/UL (ref 0–0.12)
BILIRUB SERPL-MCNC: 1.5 MG/DL (ref 0.1–1.5)
BILIRUB UR QL STRIP.AUTO: NEGATIVE
BUN SERPL-MCNC: 20 MG/DL (ref 8–22)
CALCIUM SERPL-MCNC: 11 MG/DL (ref 8.5–10.5)
CHLORIDE SERPL-SCNC: 103 MMOL/L (ref 96–112)
CO2 SERPL-SCNC: 25 MMOL/L (ref 20–33)
COLOR UR: ABNORMAL
CREAT SERPL-MCNC: 1.11 MG/DL (ref 0.5–1.4)
EOSINOPHIL # BLD AUTO: 0.07 K/UL (ref 0–0.51)
EOSINOPHIL NFR BLD: 0.7 % (ref 0–6.9)
EPI CELLS #/AREA URNS HPF: NEGATIVE /HPF
ERYTHROCYTE [DISTWIDTH] IN BLOOD BY AUTOMATED COUNT: 45.1 FL (ref 35.9–50)
GLOBULIN SER CALC-MCNC: 3.2 G/DL (ref 1.9–3.5)
GLUCOSE SERPL-MCNC: 121 MG/DL (ref 65–99)
GLUCOSE UR STRIP.AUTO-MCNC: NEGATIVE MG/DL
HCT VFR BLD AUTO: 51 % (ref 37–47)
HGB BLD-MCNC: 17 G/DL (ref 12–16)
HYALINE CASTS #/AREA URNS LPF: ABNORMAL /LPF
IMM GRANULOCYTES # BLD AUTO: 0.03 K/UL (ref 0–0.11)
IMM GRANULOCYTES NFR BLD AUTO: 0.3 % (ref 0–0.9)
KETONES UR STRIP.AUTO-MCNC: 15 MG/DL
LEUKOCYTE ESTERASE UR QL STRIP.AUTO: ABNORMAL
LIPASE SERPL-CCNC: 4 U/L (ref 11–82)
LYMPHOCYTES # BLD AUTO: 1.48 K/UL (ref 1–4.8)
LYMPHOCYTES NFR BLD: 15.6 % (ref 22–41)
MCH RBC QN AUTO: 30.9 PG (ref 27–33)
MCHC RBC AUTO-ENTMCNC: 33.3 G/DL (ref 33.6–35)
MCV RBC AUTO: 92.6 FL (ref 81.4–97.8)
MICRO URNS: ABNORMAL
MONOCYTES # BLD AUTO: 0.91 K/UL (ref 0–0.85)
MONOCYTES NFR BLD AUTO: 9.6 % (ref 0–13.4)
NEUTROPHILS # BLD AUTO: 6.95 K/UL (ref 2–7.15)
NEUTROPHILS NFR BLD: 73.4 % (ref 44–72)
NITRITE UR QL STRIP.AUTO: NEGATIVE
NRBC # BLD AUTO: 0 K/UL
NRBC BLD-RTO: 0 /100 WBC
PH UR STRIP.AUTO: 5 [PH]
PLATELET # BLD AUTO: 222 K/UL (ref 164–446)
PMV BLD AUTO: 11.4 FL (ref 9–12.9)
POTASSIUM SERPL-SCNC: 3.8 MMOL/L (ref 3.6–5.5)
PROT SERPL-MCNC: 8.4 G/DL (ref 6–8.2)
PROT UR QL STRIP: 30 MG/DL
RBC # BLD AUTO: 5.51 M/UL (ref 4.2–5.4)
RBC # URNS HPF: ABNORMAL /HPF
RBC UR QL AUTO: NEGATIVE
SODIUM SERPL-SCNC: 142 MMOL/L (ref 135–145)
SP GR UR STRIP.AUTO: 1.03
UROBILINOGEN UR STRIP.AUTO-MCNC: 1 MG/DL
WBC # BLD AUTO: 9.5 K/UL (ref 4.8–10.8)
WBC #/AREA URNS HPF: ABNORMAL /HPF

## 2018-07-22 PROCEDURE — 80053 COMPREHEN METABOLIC PANEL: CPT

## 2018-07-22 PROCEDURE — 96374 THER/PROPH/DIAG INJ IV PUSH: CPT | Mod: XU

## 2018-07-22 PROCEDURE — 81001 URINALYSIS AUTO W/SCOPE: CPT

## 2018-07-22 PROCEDURE — 99285 EMERGENCY DEPT VISIT HI MDM: CPT

## 2018-07-22 PROCEDURE — 96375 TX/PRO/DX INJ NEW DRUG ADDON: CPT

## 2018-07-22 PROCEDURE — A9270 NON-COVERED ITEM OR SERVICE: HCPCS | Performed by: EMERGENCY MEDICINE

## 2018-07-22 PROCEDURE — 83690 ASSAY OF LIPASE: CPT

## 2018-07-22 PROCEDURE — 85025 COMPLETE CBC W/AUTO DIFF WBC: CPT

## 2018-07-22 PROCEDURE — 74177 CT ABD & PELVIS W/CONTRAST: CPT

## 2018-07-22 PROCEDURE — 700117 HCHG RX CONTRAST REV CODE 255: Performed by: EMERGENCY MEDICINE

## 2018-07-22 PROCEDURE — 87086 URINE CULTURE/COLONY COUNT: CPT

## 2018-07-22 PROCEDURE — 700111 HCHG RX REV CODE 636 W/ 250 OVERRIDE (IP): Performed by: EMERGENCY MEDICINE

## 2018-07-22 PROCEDURE — 700102 HCHG RX REV CODE 250 W/ 637 OVERRIDE(OP): Performed by: EMERGENCY MEDICINE

## 2018-07-22 RX ORDER — MORPHINE SULFATE 2 MG/ML
2 INJECTION, SOLUTION INTRAMUSCULAR; INTRAVENOUS ONCE
Status: COMPLETED | OUTPATIENT
Start: 2018-07-22 | End: 2018-07-22

## 2018-07-22 RX ORDER — HYDROCODONE BITARTRATE AND ACETAMINOPHEN 5; 325 MG/1; MG/1
1 TABLET ORAL EVERY 6 HOURS PRN
Qty: 12 TAB | Refills: 0 | Status: SHIPPED | OUTPATIENT
Start: 2018-07-22 | End: 2018-07-25

## 2018-07-22 RX ORDER — SULFAMETHOXAZOLE AND TRIMETHOPRIM 800; 160 MG/1; MG/1
1 TABLET ORAL 2 TIMES DAILY
Qty: 14 TAB | Refills: 0 | Status: SHIPPED | OUTPATIENT
Start: 2018-07-22 | End: 2018-07-29

## 2018-07-22 RX ORDER — ONDANSETRON 2 MG/ML
4 INJECTION INTRAMUSCULAR; INTRAVENOUS ONCE
Status: COMPLETED | OUTPATIENT
Start: 2018-07-22 | End: 2018-07-22

## 2018-07-22 RX ORDER — HYDROCODONE BITARTRATE AND ACETAMINOPHEN 5; 325 MG/1; MG/1
1 TABLET ORAL ONCE
Status: COMPLETED | OUTPATIENT
Start: 2018-07-22 | End: 2018-07-22

## 2018-07-22 RX ADMIN — ONDANSETRON 4 MG: 2 INJECTION INTRAMUSCULAR; INTRAVENOUS at 16:40

## 2018-07-22 RX ADMIN — IOHEXOL 100 ML: 350 INJECTION, SOLUTION INTRAVENOUS at 17:59

## 2018-07-22 RX ADMIN — HYDROCODONE BITARTRATE AND ACETAMINOPHEN 1 TABLET: 5; 325 TABLET ORAL at 18:51

## 2018-07-22 RX ADMIN — MORPHINE SULFATE 2 MG: 2 INJECTION, SOLUTION INTRAMUSCULAR; INTRAVENOUS at 16:40

## 2018-07-22 NOTE — ED PROVIDER NOTES
ED Provider Note    HPI: Patient is a 78-year-old female who presented to the emergency department by ambulance transport July 22, 2018 at 2:08 PM with a chief complaint of lower abdominal pain.    The triage note reflects a complaint of chest pain after falling.  Either this note is an error or this note was entered on the wrong patient.  The patient said she fell several days denies pain in lower abdomen.  She has had no pain with urination or defecation.  She denies any pain in the chest head or neck.  She has had some nausea but no vomiting.  No other somatic complaints    Review of Systems: Positive for lower abdominal pain with nausea negative or vomiting chest head and neck pain.  Review of systems reviewed with patient, all other systems negative    Past medical/surgical history: Hypertension ptosis hyperlipidemia osteopenia cholecystectomy subdural hematoma    Medications: Lopressor Xalantan, combigan Keppra    Allergies: Tamoxifen    Social History: Patient smokes one half pack of cigarettes per day social user of alcohol      Physical exam: Constitutional: Pleasant female awake alert  Vital signs: Temperature 98.1 pulse 80 respiration 16 blood pressure 160/90 pulse oximetry 95% subjective tenderness of both lower quadrants of the abdomen that is not increased with palpation.  EYES: PERRL, EOMI, Conjunctivae and sclera normal, eyelids normal bilaterally.  Neck: Trachea midline. No cervical masses seen or palpated. Normal range of motion, supple. No meningeal signs elicited.  Cardiac: Regular rate and rhythm. S1-S2 present. No S3 or S4 present. No murmurs, rubs, or gallops heard. No edema or varicosities were seen.   Lungs: Clear to auscultation with good aeration throughout. No wheezes, rales, or rhonchi heard. Patient's chest wall moved symmetrically with each respiratory effort. Patient was not making use of accessory muscles of respiration in breathing.  Abdomen: Soft nontender to palpation. No rebound  or guarding elicited. No organomegaly identified. No pulsatile abdominal masses identified.   Musculoskeletal:  no  pain with palpitation or movement of muscle, bone or joint , no obvious musculoskeletal deformities identified.  Specifically, I could elicit no pain with palpation of either side of the chest or sternal region.  Neurologic: alert and awake answers questions appropriately. Moves all four extremities independently, no gross focal abnormalities identified. Normal strength and motor.  Skin: no rash or lesion seen, no palpable dermatologic lesions identified.  Psychiatric: not anxious, delusional, or hallucinating.    Medical decision making: Laboratory studies were obtained (please see lab sheet for all results) significant findings included unremarkable CBC.  Patient has minimal hyperglycemia blood sugar 121.  Patient appears to have an acute urinary tract infection with 20-50 white cells and positive leukocyte esterase    CT scan of the abdomen with IV contrast was obtained, no acute abnormality seen.  Specifically there is no evidence of obstruction mass or inflammatory changes.    Patient was given morphine and Zofran with improvement.  Patient be discharged with a prescription for small amount of Norco and Bactrim.  At this time the only pertinent finding on the patient is her urine being positive for signs of infection.  Her other laboratory studies are essentially unremarkable and her CT imaging is unremarkable.  She will follow-up with her primary care provider for recheck in a few days.  The patient is given the usual discharge instructions for abdominal pain.  She is carefully counseled to return to ED for fever vomiting increasing pain or any other problems    Patient verbalized understanding of these instructions and states she will comply.  Aortic disease seems unlikely given the unremarkable CT as well as diverticulitis for the same reason.  The patient has no evidence of kidney stone.  Her  signs and symptoms do not seem to go along with ischemic bowel.    I reviewed prescription monitoring program for patient's narcotic use before prescribing a scheduled drug.The patient will not drink alcohol nor drive with prescribed medications. The patient will return for new or worsening symptoms and is stable at the time of discharge.        In prescribing controlled substances to this patient, I certify that I have obtained and reviewed the medical history of . I have also made a good jose effort to obtain applicable records from other providers who have treated the patient and records did not demonstrate any increased risk of substance abuse that would prevent me from prescribing controlled substances.      I have conducted a physical exam and documented it. I have reviewed Ms Hilda Blake's prescription history as maintained by the [place] Prescription Monitoring Program.      I have assessed the patient’s risk for abuse, dependency, and addiction using the validated Opioid Risk Tool available at https://www.mdcalc.com/dpalpg-vhfn-vskc-ort-narcotic-abuse.      Given the above, I believe the benefits of controlled substance therapy outweigh the risks. The reasons for prescribing controlled substances include in my professional opinion, controlled substances are the only reasonable choice for this patientAccordingly, I have discussed the risk and benefits, treatment plan, and alternative therapies with the patient.     Impression 1) acute abdominal pain  2) acute UTI

## 2018-07-22 NOTE — ED TRIAGE NOTES
Pt reports falling into the door jam at her assisted living home.  She hit the front of her chest and pain is on both sides.  Pt's pain unreleived by ibuprofen.

## 2018-07-23 NOTE — ED NOTES
Patient taken to lobby, patient now states that she is unsure of address, address retrieved and facility called for transportation. Facility states that they will call back.

## 2018-07-23 NOTE — DISCHARGE INSTRUCTIONS
Abdominal Pain (Nonspecific)  Your exam might not show the exact reason you have abdominal pain. Since there are many different causes of abdominal pain, another checkup and more tests may be needed. It is very important to follow up for lasting (persistent) or worsening symptoms. A possible cause of abdominal pain in any person who still has his or her appendix is acute appendicitis. Appendicitis is often hard to diagnose. Normal blood tests, urine tests, ultrasound, and CT scans do not completely rule out early appendicitis or other causes of abdominal pain. Sometimes, only the changes that happen over time will allow appendicitis and other causes of abdominal pain to be determined. Other potential problems that may require surgery may also take time to become more apparent. Because of this, it is important that you follow all of the instructions below.  HOME CARE INSTRUCTIONS   · Rest as much as possible.   · Do not eat solid food until your pain is gone.   · While adults or children have pain: A diet of water, weak decaffeinated tea, broth or bouillon, gelatin, oral rehydration solutions (ORS), frozen ice pops, or ice chips may be helpful.   · When pain is gone in adults or children: Start a light diet (dry toast, crackers, applesauce, or white rice). Increase the diet slowly as long as it does not bother you. Eat no dairy products (including cheese and eggs) and no spicy, fatty, fried, or high-fiber foods.   · Use no alcohol, caffeine, or cigarettes.   · Take your regular medicines unless your caregiver told you not to.   · Take any prescribed medicine as directed.   · Only take over-the-counter or prescription medicines for pain, discomfort, or fever as directed by your caregiver. Do not give aspirin to children.   If your caregiver has given you a follow-up appointment, it is very important to keep that appointment. Not keeping the appointment could result in a permanent injury and/or lasting (chronic) pain  and/or disability. If there is any problem keeping the appointment, you must call to reschedule.   SEEK IMMEDIATE MEDICAL CARE IF:   · Your pain is not gone in 24 hours.   · Your pain becomes worse, changes location, or feels different.   · You or your child has an oral temperature above 102° F (38.9° C), not controlled by medicine.   · Your baby is older than 3 months with a rectal temperature of 102° F (38.9° C) or higher.   · Your baby is 3 months old or younger with a rectal temperature of 100.4° F (38° C) or higher.   · You have shaking chills.   · You keep throwing up (vomiting) or cannot drink liquids.   · There is blood in your vomit or you see blood in your bowel movements.   · Your bowel movements become dark or black.   · You have frequent bowel movements.   · Your bowel movements stop (become blocked) or you cannot pass gas.   · You have bloody, frequent, or painful urination.   · You have yellow discoloration in the skin or whites of the eyes.   · Your stomach becomes bloated or bigger.   · You have dizziness or fainting.   · You have chest or back pain.   MAKE SURE YOU:   · Understand these instructions.   · Will watch your condition.   · Will get help right away if you are not doing well or get worse.   Document Released: 12/18/2006 Document Revised: 03/11/2013 Document Reviewed: 11/15/2010  ExitCare® Patient Information ©2013 [employer].  Urinary Tract Infection, Adult  A urinary tract infection (UTI) is an infection of any part of the urinary tract, which includes the kidneys, ureters, bladder, and urethra. These organs make, store, and get rid of urine in the body. UTI can be a bladder infection (cystitis) or kidney infection (pyelonephritis).  What are the causes?  This infection may be caused by fungi, viruses, or bacteria. Bacteria are the most common cause of UTIs. This condition can also be caused by repeated incomplete emptying of the bladder during urination.  What increases the  risk?  This condition is more likely to develop if:  · You ignore your need to urinate or hold urine for long periods of time.  · You do not empty your bladder completely during urination.  · You wipe back to front after urinating or having a bowel movement, if you are female.  · You are uncircumcised, if you are male.  · You are constipated.  · You have a urinary catheter that stays in place (indwelling).  · You have a weak defense (immune) system.  · You have a medical condition that affects your bowels, kidneys, or bladder.  · You have diabetes.  · You take antibiotic medicines frequently or for long periods of time, and the antibiotics no longer work well against certain types of infections (antibiotic resistance).  · You take medicines that irritate your urinary tract.  · You are exposed to chemicals that irritate your urinary tract.  · You are female.  What are the signs or symptoms?  Symptoms of this condition include:  · Fever.  · Frequent urination or passing small amounts of urine frequently.  · Needing to urinate urgently.  · Pain or burning with urination.  · Urine that smells bad or unusual.  · Cloudy urine.  · Pain in the lower abdomen or back.  · Trouble urinating.  · Blood in the urine.  · Vomiting or being less hungry than normal.  · Diarrhea or abdominal pain.  · Vaginal discharge, if you are female.  How is this diagnosed?  This condition is diagnosed with a medical history and physical exam. You will also need to provide a urine sample to test your urine. Other tests may be done, including:  · Blood tests.  · Sexually transmitted disease (STD) testing.  If you have had more than one UTI, a cystoscopy or imaging studies may be done to determine the cause of the infections.  How is this treated?  Treatment for this condition often includes a combination of two or more of the following:  · Antibiotic medicine.  · Other medicines to treat less common causes of UTI.  · Over-the-counter medicines to  treat pain.  · Drinking enough water to stay hydrated.  Follow these instructions at home:  · Take over-the-counter and prescription medicines only as told by your health care provider.  · If you were prescribed an antibiotic, take it as told by your health care provider. Do not stop taking the antibiotic even if you start to feel better.  · Avoid alcohol, caffeine, tea, and carbonated beverages. They can irritate your bladder.  · Drink enough fluid to keep your urine clear or pale yellow.  · Keep all follow-up visits as told by your health care provider. This is important.  · Make sure to:  ¨ Empty your bladder often and completely. Do not hold urine for long periods of time.  ¨ Empty your bladder before and after sex.  ¨ Wipe from front to back after a bowel movement if you are female. Use each tissue one time when you wipe.  Contact a health care provider if:  · You have back pain.  · You have a fever.  · You feel nauseous or vomit.  · Your symptoms do not get better after 3 days.  · Your symptoms go away and then return.  Get help right away if:  · You have severe back pain or lower abdominal pain.  · You are vomiting and cannot keep down any medicines or water.  This information is not intended to replace advice given to you by your health care provider. Make sure you discuss any questions you have with your health care provider.  Document Released: 09/27/2006 Document Revised: 05/31/2017 Document Reviewed: 11/07/2016  [employer] Interactive Patient Education © 2017 [employer]

## 2018-07-23 NOTE — ED NOTES
Patient verbalized understanding of dc and follow up, no acute distress needed, patient stable for discharge.

## 2018-07-23 NOTE — DISCHARGE PLANNING
Medical Social Work    Referral: Transportation Assistance    Intervention: MSW received a call from bedside RN that pt is out in ER Lobby and needs assistance back to [facility].  RN states that pt's family is out of town and [place] [facility] is unable to arrange to pick pt up.  MSW reviewed pt's chart and pt hasn't been provided with transportation assistance in the past.  Per RN pt is capable of going to facility by cab alone.  MSW provided pt with cab voucher (# [id number]) to return home: [address] [place] [address] safely.  [employer] was called and will send a cab to pick pt up.  ER Lobby staff updated.    Plan: Pt to return to [facility] via cab.

## 2018-07-24 LAB
BACTERIA UR CULT: NORMAL
SIGNIFICANT IND 70042: NORMAL
SITE SITE: NORMAL
SOURCE SOURCE: NORMAL

## 2018-07-30 ENCOUNTER — HOSPITAL ENCOUNTER (OUTPATIENT)
Dept: LAB | Facility: MEDICAL CENTER | Age: 78
End: 2018-07-30
Attending: PSYCHIATRY & NEUROLOGY
Payer: MEDICARE

## 2018-07-30 LAB
CK SERPL-CCNC: 138 U/L (ref 0–154)
FOLATE SERPL-MCNC: >24 NG/ML
VIT B12 SERPL-MCNC: 368 PG/ML (ref 211–911)

## 2018-07-30 PROCEDURE — 82607 VITAMIN B-12: CPT

## 2018-07-30 PROCEDURE — 83519 RIA NONANTIBODY: CPT

## 2018-07-30 PROCEDURE — 36415 COLL VENOUS BLD VENIPUNCTURE: CPT

## 2018-07-30 PROCEDURE — 82746 ASSAY OF FOLIC ACID SERUM: CPT

## 2018-07-30 PROCEDURE — 82550 ASSAY OF CK (CPK): CPT

## 2018-07-30 PROCEDURE — 86255 FLUORESCENT ANTIBODY SCREEN: CPT

## 2018-07-30 PROCEDURE — 83516 IMMUNOASSAY NONANTIBODY: CPT | Mod: 91

## 2018-08-02 LAB — TEST NAME 95000: NORMAL

## 2018-08-14 ENCOUNTER — HOSPITAL ENCOUNTER (OUTPATIENT)
Dept: RADIOLOGY | Facility: MEDICAL CENTER | Age: 78
End: 2018-08-14
Attending: NEUROLOGICAL SURGERY
Payer: MEDICARE

## 2018-08-14 DIAGNOSIS — S06.5X0A TRAUMATIC SUBDURAL HEMORRHAGE WITHOUT LOSS OF CONSCIOUSNESS, INITIAL ENCOUNTER (HCC): ICD-10-CM

## 2018-08-14 PROCEDURE — 70450 CT HEAD/BRAIN W/O DYE: CPT

## 2018-09-18 ENCOUNTER — APPOINTMENT (RX ONLY)
Dept: URBAN - METROPOLITAN AREA CLINIC 4 | Facility: CLINIC | Age: 78
Setting detail: DERMATOLOGY
End: 2018-09-18

## 2018-09-18 DIAGNOSIS — L21.8 OTHER SEBORRHEIC DERMATITIS: ICD-10-CM

## 2018-09-18 DIAGNOSIS — D18.0 HEMANGIOMA: ICD-10-CM

## 2018-09-18 DIAGNOSIS — L82.1 OTHER SEBORRHEIC KERATOSIS: ICD-10-CM

## 2018-09-18 DIAGNOSIS — B07.8 OTHER VIRAL WARTS: ICD-10-CM

## 2018-09-18 DIAGNOSIS — Z71.89 OTHER SPECIFIED COUNSELING: ICD-10-CM

## 2018-09-18 DIAGNOSIS — L81.4 OTHER MELANIN HYPERPIGMENTATION: ICD-10-CM

## 2018-09-18 PROBLEM — D18.01 HEMANGIOMA OF SKIN AND SUBCUTANEOUS TISSUE: Status: ACTIVE | Noted: 2018-09-18

## 2018-09-18 PROBLEM — I10 ESSENTIAL (PRIMARY) HYPERTENSION: Status: ACTIVE | Noted: 2018-09-18

## 2018-09-18 PROBLEM — D48.5 NEOPLASM OF UNCERTAIN BEHAVIOR OF SKIN: Status: ACTIVE | Noted: 2018-09-18

## 2018-09-18 PROBLEM — Z85.3 PERSONAL HISTORY OF MALIGNANT NEOPLASM OF BREAST: Status: ACTIVE | Noted: 2018-09-18

## 2018-09-18 PROCEDURE — ? BIOPSY BY SHAVE METHOD

## 2018-09-18 PROCEDURE — 99203 OFFICE O/P NEW LOW 30 MIN: CPT | Mod: 25

## 2018-09-18 PROCEDURE — ? COUNSELING

## 2018-09-18 PROCEDURE — 11100: CPT

## 2018-09-18 ASSESSMENT — LOCATION DETAILED DESCRIPTION DERM
LOCATION DETAILED: LEFT MEDIAL UPPER BACK
LOCATION DETAILED: RIGHT SUPERIOR MEDIAL FOREHEAD
LOCATION DETAILED: LEFT PROXIMAL DORSAL FOREARM
LOCATION DETAILED: LEFT RIB CAGE
LOCATION DETAILED: RIGHT INFERIOR MEDIAL FOREHEAD
LOCATION DETAILED: RIGHT PROXIMAL DORSAL FOREARM
LOCATION DETAILED: RIGHT MEDIAL MALAR CHEEK
LOCATION DETAILED: RIGHT SUPERIOR PARIETAL SCALP

## 2018-09-18 ASSESSMENT — LOCATION SIMPLE DESCRIPTION DERM
LOCATION SIMPLE: SCALP
LOCATION SIMPLE: RIGHT FOREARM
LOCATION SIMPLE: RIGHT CHEEK
LOCATION SIMPLE: LEFT FOREARM
LOCATION SIMPLE: RIGHT FOREHEAD
LOCATION SIMPLE: LEFT UPPER BACK
LOCATION SIMPLE: ABDOMEN
LOCATION SIMPLE: RIGHT FOREHEAD

## 2018-09-18 ASSESSMENT — LOCATION ZONE DERM
LOCATION ZONE: FACE
LOCATION ZONE: SCALP
LOCATION ZONE: TRUNK
LOCATION ZONE: ARM
LOCATION ZONE: FACE

## 2018-09-18 NOTE — PROCEDURE: BIOPSY BY SHAVE METHOD
Size Of Lesion In Cm: 0
Dressing: pressure dressing with telfa
Electrodesiccation And Curettage Text: The wound bed was treated with electrodesiccation and curettage after the biopsy was performed.
Destruction After The Procedure: No
Wound Care: Petrolatum
Was A Bandage Applied: Yes
Biopsy Type: H and E
Curettage Text: The wound bed was treated with curettage after the biopsy was performed.
Lab: [id number]
Biopsy Method: Personna blade
Detail Level: Detailed
Silver Nitrate Text: The wound bed was treated with silver nitrate after the biopsy was performed.
Billing Type: Third-Party Bill
Post-Care Instructions: I reviewed with the patient in detail post-care instructions. Patient is to keep the biopsy site dry overnight. Gentle cleansing daily.  Apply petroleum ointment daily until healed. Patient may apply hydrogen peroxide soaks to remove any crusting.
Electrodesiccation Text: The wound bed was treated with electrodesiccation after the biopsy was performed.
Notification Instructions: Patient will be notified of biopsy results. However, patient instructed to call the office if not contacted within 2 weeks.
Lab Facility: 
Cryotherapy Text: The wound bed was treated with cryotherapy after the biopsy was performed.
Depth Of Biopsy: dermis
Consent: Written consent was obtained and risks were reviewed including but not limited to scarring, infection, bleeding, scabbing, incomplete removal, nerve damage and allergy to anesthesia.
Hemostasis: Drysol
Type Of Destruction Used: Curettage
Anesthesia Type: 1% lidocaine with 1:100,000 epinephrine and a 1:10 solution of 8.4% sodium bicarbonate
Anesthesia Volume In Cc: 2

## 2018-09-18 NOTE — PROCEDURE: COUNSELING
Detail Level: Zone
Detail Level: Detailed
Patient Specific Counseling (Will Not Stick From Patient To Patient): Patient advised to use dandruff shampoo and to leave on scalp for 5 minuets.
Detail Level: Simple

## 2019-01-11 ENCOUNTER — HOSPITAL ENCOUNTER (INPATIENT)
Facility: MEDICAL CENTER | Age: 79
LOS: 3 days | DRG: 470 | End: 2019-01-14
Attending: EMERGENCY MEDICINE | Admitting: INTERNAL MEDICINE
Payer: MEDICARE

## 2019-01-11 ENCOUNTER — APPOINTMENT (OUTPATIENT)
Dept: RADIOLOGY | Facility: MEDICAL CENTER | Age: 79
DRG: 470 | End: 2019-01-11
Attending: EMERGENCY MEDICINE
Payer: MEDICARE

## 2019-01-11 ENCOUNTER — APPOINTMENT (OUTPATIENT)
Dept: RADIOLOGY | Facility: MEDICAL CENTER | Age: 79
DRG: 470 | End: 2019-01-11
Attending: INTERNAL MEDICINE
Payer: MEDICARE

## 2019-01-11 DIAGNOSIS — S72.001A CLOSED FRACTURE OF NECK OF RIGHT FEMUR, INITIAL ENCOUNTER (HCC): ICD-10-CM

## 2019-01-11 DIAGNOSIS — S72.001A CLOSED RIGHT HIP FRACTURE, INITIAL ENCOUNTER (HCC): ICD-10-CM

## 2019-01-11 LAB
ALBUMIN SERPL BCP-MCNC: 4.4 G/DL (ref 3.2–4.9)
ALBUMIN/GLOB SERPL: 1.5 G/DL
ALP SERPL-CCNC: 86 U/L (ref 30–99)
ALT SERPL-CCNC: 10 U/L (ref 2–50)
ANION GAP SERPL CALC-SCNC: 10 MMOL/L (ref 0–11.9)
APTT PPP: 28 SEC (ref 24.7–36)
AST SERPL-CCNC: 15 U/L (ref 12–45)
BASOPHILS # BLD AUTO: 0.2 % (ref 0–1.8)
BASOPHILS # BLD: 0.04 K/UL (ref 0–0.12)
BILIRUB SERPL-MCNC: 0.9 MG/DL (ref 0.1–1.5)
BUN SERPL-MCNC: 24 MG/DL (ref 8–22)
CALCIUM SERPL-MCNC: 9.6 MG/DL (ref 8.5–10.5)
CHLORIDE SERPL-SCNC: 105 MMOL/L (ref 96–112)
CO2 SERPL-SCNC: 25 MMOL/L (ref 20–33)
CREAT SERPL-MCNC: 0.81 MG/DL (ref 0.5–1.4)
EOSINOPHIL # BLD AUTO: 0.01 K/UL (ref 0–0.51)
EOSINOPHIL NFR BLD: 0.1 % (ref 0–6.9)
ERYTHROCYTE [DISTWIDTH] IN BLOOD BY AUTOMATED COUNT: 45.9 FL (ref 35.9–50)
GLOBULIN SER CALC-MCNC: 3 G/DL (ref 1.9–3.5)
GLUCOSE SERPL-MCNC: 114 MG/DL (ref 65–99)
HCT VFR BLD AUTO: 46.5 % (ref 37–47)
HGB BLD-MCNC: 15.4 G/DL (ref 12–16)
IMM GRANULOCYTES # BLD AUTO: 0.11 K/UL (ref 0–0.11)
IMM GRANULOCYTES NFR BLD AUTO: 0.6 % (ref 0–0.9)
INR PPP: 1.03 (ref 0.87–1.13)
LYMPHOCYTES # BLD AUTO: 0.71 K/UL (ref 1–4.8)
LYMPHOCYTES NFR BLD: 4.1 % (ref 22–41)
MAGNESIUM SERPL-MCNC: 2 MG/DL (ref 1.5–2.5)
MCH RBC QN AUTO: 30.5 PG (ref 27–33)
MCHC RBC AUTO-ENTMCNC: 33.1 G/DL (ref 33.6–35)
MCV RBC AUTO: 92.1 FL (ref 81.4–97.8)
MONOCYTES # BLD AUTO: 1.5 K/UL (ref 0–0.85)
MONOCYTES NFR BLD AUTO: 8.6 % (ref 0–13.4)
NEUTROPHILS # BLD AUTO: 15.07 K/UL (ref 2–7.15)
NEUTROPHILS NFR BLD: 86.4 % (ref 44–72)
NRBC # BLD AUTO: 0 K/UL
NRBC BLD-RTO: 0 /100 WBC
PLATELET # BLD AUTO: 197 K/UL (ref 164–446)
PMV BLD AUTO: 11 FL (ref 9–12.9)
POTASSIUM SERPL-SCNC: 3.9 MMOL/L (ref 3.6–5.5)
PROT SERPL-MCNC: 7.4 G/DL (ref 6–8.2)
PROTHROMBIN TIME: 13.6 SEC (ref 12–14.6)
RBC # BLD AUTO: 5.05 M/UL (ref 4.2–5.4)
SODIUM SERPL-SCNC: 140 MMOL/L (ref 135–145)
TROPONIN I SERPL-MCNC: 0.01 NG/ML (ref 0–0.04)
WBC # BLD AUTO: 17.4 K/UL (ref 4.8–10.8)

## 2019-01-11 PROCEDURE — 73552 X-RAY EXAM OF FEMUR 2/>: CPT | Mod: RT

## 2019-01-11 PROCEDURE — 700105 HCHG RX REV CODE 258: Performed by: EMERGENCY MEDICINE

## 2019-01-11 PROCEDURE — 80053 COMPREHEN METABOLIC PANEL: CPT

## 2019-01-11 PROCEDURE — 700111 HCHG RX REV CODE 636 W/ 250 OVERRIDE (IP): Performed by: EMERGENCY MEDICINE

## 2019-01-11 PROCEDURE — 71045 X-RAY EXAM CHEST 1 VIEW: CPT

## 2019-01-11 PROCEDURE — 83735 ASSAY OF MAGNESIUM: CPT

## 2019-01-11 PROCEDURE — 96375 TX/PRO/DX INJ NEW DRUG ADDON: CPT

## 2019-01-11 PROCEDURE — 304561 HCHG STAT O2

## 2019-01-11 PROCEDURE — 770001 HCHG ROOM/CARE - MED/SURG/GYN PRIV*

## 2019-01-11 PROCEDURE — 96376 TX/PRO/DX INJ SAME DRUG ADON: CPT

## 2019-01-11 PROCEDURE — 85025 COMPLETE CBC W/AUTO DIFF WBC: CPT

## 2019-01-11 PROCEDURE — 85610 PROTHROMBIN TIME: CPT

## 2019-01-11 PROCEDURE — 85730 THROMBOPLASTIN TIME PARTIAL: CPT

## 2019-01-11 PROCEDURE — 84484 ASSAY OF TROPONIN QUANT: CPT

## 2019-01-11 PROCEDURE — 99285 EMERGENCY DEPT VISIT HI MDM: CPT

## 2019-01-11 PROCEDURE — 99223 1ST HOSP IP/OBS HIGH 75: CPT | Performed by: INTERNAL MEDICINE

## 2019-01-11 PROCEDURE — 700101 HCHG RX REV CODE 250: Performed by: INTERNAL MEDICINE

## 2019-01-11 PROCEDURE — 70450 CT HEAD/BRAIN W/O DYE: CPT

## 2019-01-11 PROCEDURE — 700105 HCHG RX REV CODE 258: Performed by: INTERNAL MEDICINE

## 2019-01-11 PROCEDURE — 72170 X-RAY EXAM OF PELVIS: CPT

## 2019-01-11 PROCEDURE — 36415 COLL VENOUS BLD VENIPUNCTURE: CPT

## 2019-01-11 PROCEDURE — 96374 THER/PROPH/DIAG INJ IV PUSH: CPT

## 2019-01-11 PROCEDURE — 700102 HCHG RX REV CODE 250 W/ 637 OVERRIDE(OP): Performed by: INTERNAL MEDICINE

## 2019-01-11 PROCEDURE — A9270 NON-COVERED ITEM OR SERVICE: HCPCS | Performed by: INTERNAL MEDICINE

## 2019-01-11 RX ORDER — TIMOLOL MALEATE 5 MG/ML
1 SOLUTION/ DROPS OPHTHALMIC 2 TIMES DAILY
Status: DISCONTINUED | OUTPATIENT
Start: 2019-01-11 | End: 2019-01-14 | Stop reason: HOSPADM

## 2019-01-11 RX ORDER — SODIUM CHLORIDE 9 MG/ML
INJECTION, SOLUTION INTRAVENOUS CONTINUOUS
Status: DISCONTINUED | OUTPATIENT
Start: 2019-01-11 | End: 2019-01-14 | Stop reason: HOSPADM

## 2019-01-11 RX ORDER — LEVETIRACETAM 500 MG/1
500 TABLET ORAL 2 TIMES DAILY
Status: DISCONTINUED | OUTPATIENT
Start: 2019-01-11 | End: 2019-01-14 | Stop reason: HOSPADM

## 2019-01-11 RX ORDER — BISACODYL 10 MG
10 SUPPOSITORY, RECTAL RECTAL
Status: DISCONTINUED | OUTPATIENT
Start: 2019-01-11 | End: 2019-01-14 | Stop reason: HOSPADM

## 2019-01-11 RX ORDER — MORPHINE SULFATE 4 MG/ML
2 INJECTION, SOLUTION INTRAMUSCULAR; INTRAVENOUS ONCE
Status: COMPLETED | OUTPATIENT
Start: 2019-01-11 | End: 2019-01-11

## 2019-01-11 RX ORDER — ONDANSETRON 2 MG/ML
4 INJECTION INTRAMUSCULAR; INTRAVENOUS ONCE
Status: COMPLETED | OUTPATIENT
Start: 2019-01-11 | End: 2019-01-11

## 2019-01-11 RX ORDER — ASCORBIC ACID 500 MG
500 TABLET ORAL DAILY
COMMUNITY
End: 2019-01-11

## 2019-01-11 RX ORDER — LEVETIRACETAM 250 MG/1
250 TABLET ORAL DAILY
COMMUNITY
End: 2020-02-15

## 2019-01-11 RX ORDER — LATANOPROST 50 UG/ML
1 SOLUTION/ DROPS OPHTHALMIC NIGHTLY
Status: DISCONTINUED | OUTPATIENT
Start: 2019-01-11 | End: 2019-01-14 | Stop reason: HOSPADM

## 2019-01-11 RX ORDER — ACETAMINOPHEN 325 MG/1
650 TABLET ORAL EVERY 6 HOURS PRN
Status: DISCONTINUED | OUTPATIENT
Start: 2019-01-11 | End: 2019-01-14 | Stop reason: HOSPADM

## 2019-01-11 RX ORDER — AMLODIPINE BESYLATE 5 MG/1
5 TABLET ORAL DAILY
Status: DISCONTINUED | OUTPATIENT
Start: 2019-01-12 | End: 2019-01-14 | Stop reason: HOSPADM

## 2019-01-11 RX ORDER — POLYETHYLENE GLYCOL 3350 17 G/17G
1 POWDER, FOR SOLUTION ORAL
Status: DISCONTINUED | OUTPATIENT
Start: 2019-01-11 | End: 2019-01-14 | Stop reason: HOSPADM

## 2019-01-11 RX ORDER — BRIMONIDINE TARTRATE 2 MG/ML
1 SOLUTION/ DROPS OPHTHALMIC 2 TIMES DAILY
Status: DISCONTINUED | OUTPATIENT
Start: 2019-01-11 | End: 2019-01-14 | Stop reason: HOSPADM

## 2019-01-11 RX ORDER — SODIUM CHLORIDE 9 MG/ML
INJECTION, SOLUTION INTRAVENOUS CONTINUOUS
Status: DISCONTINUED | OUTPATIENT
Start: 2019-01-11 | End: 2019-01-11

## 2019-01-11 RX ORDER — LATANOPROST 50 UG/ML
1 SOLUTION/ DROPS OPHTHALMIC NIGHTLY
COMMUNITY
End: 2020-02-15

## 2019-01-11 RX ORDER — AMLODIPINE BESYLATE 5 MG/1
5 TABLET ORAL DAILY
COMMUNITY
End: 2020-02-15

## 2019-01-11 RX ORDER — AMOXICILLIN 250 MG
2 CAPSULE ORAL 2 TIMES DAILY
Status: DISCONTINUED | OUTPATIENT
Start: 2019-01-11 | End: 2019-01-14 | Stop reason: HOSPADM

## 2019-01-11 RX ORDER — BRIMONIDINE TARTRATE AND TIMOLOL MALEATE 2; 5 MG/ML; MG/ML
1 SOLUTION OPHTHALMIC 2 TIMES DAILY
Status: DISCONTINUED | OUTPATIENT
Start: 2019-01-11 | End: 2019-01-11

## 2019-01-11 RX ORDER — MORPHINE SULFATE 4 MG/ML
4 INJECTION, SOLUTION INTRAMUSCULAR; INTRAVENOUS ONCE
Status: COMPLETED | OUTPATIENT
Start: 2019-01-11 | End: 2019-01-11

## 2019-01-11 RX ADMIN — MORPHINE SULFATE 2 MG: 4 INJECTION INTRAVENOUS at 15:39

## 2019-01-11 RX ADMIN — ONDANSETRON 4 MG: 2 INJECTION INTRAMUSCULAR; INTRAVENOUS at 15:06

## 2019-01-11 RX ADMIN — STANDARDIZED SENNA CONCENTRATE AND DOCUSATE SODIUM 2 TABLET: 8.6; 5 TABLET, FILM COATED ORAL at 22:40

## 2019-01-11 RX ADMIN — LEVETIRACETAM 500 MG: 500 TABLET, FILM COATED ORAL at 22:40

## 2019-01-11 RX ADMIN — LATANOPROST 1 DROP: 50 SOLUTION/ DROPS OPHTHALMIC at 22:41

## 2019-01-11 RX ADMIN — MORPHINE SULFATE 2 MG: 4 INJECTION INTRAVENOUS at 15:06

## 2019-01-11 RX ADMIN — SODIUM CHLORIDE: 9 INJECTION, SOLUTION INTRAVENOUS at 22:37

## 2019-01-11 RX ADMIN — MORPHINE SULFATE 4 MG: 4 INJECTION INTRAVENOUS at 16:16

## 2019-01-11 RX ADMIN — METOPROLOL TARTRATE 25 MG: 25 TABLET, FILM COATED ORAL at 22:39

## 2019-01-11 RX ADMIN — TIMOLOL MALEATE 1 DROP: 5 SOLUTION OPHTHALMIC at 22:39

## 2019-01-11 RX ADMIN — BRIMONIDINE TARTRATE 1 DROP: 2 SOLUTION OPHTHALMIC at 22:39

## 2019-01-11 RX ADMIN — ACETAMINOPHEN 650 MG: 325 TABLET, FILM COATED ORAL at 22:41

## 2019-01-11 RX ADMIN — SODIUM CHLORIDE: 9 INJECTION, SOLUTION INTRAVENOUS at 15:06

## 2019-01-11 ASSESSMENT — COGNITIVE AND FUNCTIONAL STATUS - GENERAL
DAILY ACTIVITIY SCORE: 18
MOBILITY SCORE: 10
HELP NEEDED FOR BATHING: A LOT
TURNING FROM BACK TO SIDE WHILE IN FLAT BAD: A LOT
DRESSING REGULAR LOWER BODY CLOTHING: A LOT
STANDING UP FROM CHAIR USING ARMS: A LOT
MOVING TO AND FROM BED TO CHAIR: UNABLE
DRESSING REGULAR UPPER BODY CLOTHING: A LITTLE
CLIMB 3 TO 5 STEPS WITH RAILING: A LOT
MOVING FROM LYING ON BACK TO SITTING ON SIDE OF FLAT BED: UNABLE
TOILETING: A LITTLE
SUGGESTED CMS G CODE MODIFIER DAILY ACTIVITY: CK
WALKING IN HOSPITAL ROOM: A LOT
SUGGESTED CMS G CODE MODIFIER MOBILITY: CL

## 2019-01-11 ASSESSMENT — LIFESTYLE VARIABLES
TOTAL SCORE: 0
EVER HAD A DRINK FIRST THING IN THE MORNING TO STEADY YOUR NERVES TO GET RID OF A HANGOVER: NO
HOW MANY TIMES IN THE PAST YEAR HAVE YOU HAD 5 OR MORE DRINKS IN A DAY: 0
HAVE YOU EVER FELT YOU SHOULD CUT DOWN ON YOUR DRINKING: NO
TOTAL SCORE: 0
CONSUMPTION TOTAL: NEGATIVE
AVERAGE NUMBER OF DAYS PER WEEK YOU HAVE A DRINK CONTAINING ALCOHOL: 0
EVER FELT BAD OR GUILTY ABOUT YOUR DRINKING: NO
HAVE PEOPLE ANNOYED YOU BY CRITICIZING YOUR DRINKING: NO
EVER_SMOKED: YES
ON A TYPICAL DAY WHEN YOU DRINK ALCOHOL HOW MANY DRINKS DO YOU HAVE: 1
DO YOU DRINK ALCOHOL: YES
TOTAL SCORE: 0

## 2019-01-11 ASSESSMENT — PAIN SCALES - GENERAL
PAINLEVEL_OUTOF10: 7
PAINLEVEL_OUTOF10: 7
PAINLEVEL_OUTOF10: 5
PAINLEVEL_OUTOF10: 5
PAINLEVEL_OUTOF10: 8

## 2019-01-11 ASSESSMENT — COPD QUESTIONNAIRES
COPD SCREENING SCORE: 5
DO YOU EVER COUGH UP ANY MUCUS OR PHLEGM?: NO/ONLY WITH OCCASIONAL COLDS OR INFECTIONS
HAVE YOU SMOKED AT LEAST 100 CIGARETTES IN YOUR ENTIRE LIFE: YES
IN THE PAST 12 MONTHS DO YOU DO LESS THAN YOU USED TO BECAUSE OF YOUR BREATHING PROBLEMS: DISAGREE/UNSURE
DURING THE PAST 4 WEEKS HOW MUCH DID YOU FEEL SHORT OF BREATH: SOME OF THE TIME

## 2019-01-11 ASSESSMENT — PAIN DESCRIPTION - DESCRIPTORS: DESCRIPTORS: ACHING

## 2019-01-11 NOTE — ED PROVIDER NOTES
ED Provider Note    CHIEF COMPLAINT  Chief Complaint   Patient presents with   • Groin Pain   • T-5000 GLF       HPI  Hilda Blake is a 78 y.o. female who presents for evaluation of hip pain.  The patient slipped and fell injuring her right hip area today.  Patient denies: Head trauma, head pain, neck pain, back pain, rib pain, chest pain, difficulty breathing, nausea, vomiting, hematemesis, melena hematochezia, other extremity pain or trauma.  There is no syncope associated with this.  No other acute symptomatology or complaints.    REVIEW OF SYSTEMS  See HPI for further details. All other systems negative.    PAST MEDICAL HISTORY  Past Medical History:   Diagnosis Date   • Breast cancer (HCC) 2004    treated with radiation and resection   • Glaucoma - Dr. Lima 4/20/2010   • Hemangioma of liver    • Hemangioma of liver benign 8/19/2013   • HTN (hypertension), benign    • Hyperlipidemia    • Hypertension    • Osteopenia    • Ptosis of eyelid     right   • Tobacco abuse     chronic       FAMILY HISTORY  Family History   Problem Relation Age of Onset   • Autoimmune Disease Son         Sarcoidosis   • Cancer Son 48        colon    • No Known Problems Son    • No Known Problems Son    • No Known Problems Daughter    • Diabetes Neg Hx    • Hypertension Neg Hx    • Heart Disease Neg Hx        SOCIAL HISTORY  Previous smoker; occasional alcohol use;    SURGICAL HISTORY  Past Surgical History:   Procedure Laterality Date   • CHOLECYSTECTOMY     • CHOLECYSTECTOMY     • MASTECTOMY BILATERAL SUBQ     • MASTECTOMY BILATERAL SUBQ         CURRENT MEDICATIONS  Home Medications     Reviewed by Rozina Bryan R.N. (Registered Nurse) on 01/11/19 at 1408  Med List Status: Partial   Medication Last Dose Status   acetaminophen (TYLENOL) 500 MG Tab  Active   AMLODIPINE BESYLATE PO  Active   ascorbic acid (ASCORBIC ACID) 500 MG Tab 1/11/2019 Active   Brimonidine Tartrate-Timolol (COMBIGAN) 0.2-0.5 % Solution 1/10/2019 Active  "  latanoprost (XALATAN) 0.005 % Solution 1/10/2019 Active   metoprolol (LOPRESSOR) 25 MG Tab  Active   prednisoLONE acetate (PRED FORTE) 1 % Suspension  Active   vitamin D (CHOLECALCIFEROL) 1000 UNIT TABS  Active                ALLERGIES  Allergies   Allergen Reactions   • Tamoxifen Anaphylaxis     bleeding       PHYSICAL EXAM  VITAL SIGNS: /81   Pulse 96   Temp 37 °C (98.6 °F) (Temporal)   Resp 16   Ht 1.626 m (5' 4\")   Wt 53.5 kg (118 lb)   BMI 20.25 kg/m²    Constitutional: 78-year-old female, appears uncomfortable, oriented x3  HENT: ,Atraumatic, Bilateral external ears normal, tympanic membranes clear, Oropharynx mildly dry, No oral exudates, Nose normal.   Eyes: PERRL, EOMI, Conjunctiva normal, No discharge.   Neck: Normal range of motion, No tenderness, Supple, No stridor.   Lymphatic: No lymphadenopathy noted.   Cardiovascular: Normal heart rate, Normal rhythm, No murmurs, No rubs, No gallops.   Thorax & Lungs: Decreased breath sounds, No respiratory distress, No wheezing, no stridor, no rales. No chest tenderness.   Abdomen: Soft, nontender, nondistended, no organomegaly, positive bowel sounds normal in quality. No guarding or rebound.  Skin: Decreased skin turgor, pink, warm, dry. No rashes, petechiae, purpura. Normal capillary refill.   Back: No tenderness, No CVA tenderness.   Extremities: Intact distal pulses, No edema, No tenderness, No cyanosis, No clubbing. Vascular: Pulses are 2+, symmetric in the upper and lower extremities.  Musculoskeletal: Diffuse arthritic changes; the patient appears to have pain in the suprapubic and right hip area with range of motion but no obvious deformity identified no shortening; no palpable deformity to the right thigh/knee/leg/ankle or ankle; motor, sensory, vascular intact distally;  Neurologic: Alert & oriented x 3, Normal motor function, Normal sensory function, No gross focal deficits noted.   Psychiatric: Affect normal, Judgment normal, Mood normal. "     RADIOLOGY/PROCEDURES  DX-FEMUR-2+ RIGHT   Final Result      Displaced subcapital right femoral neck fracture.      DX-PELVIS-1 OR 2 VIEWS   Final Result      Displaced right femoral neck fracture.      DX-CHEST-PORTABLE (1 VIEW)   Final Result      Patchy opacity in the peripheral right upper lobe may represent pneumonitis. Short interval follow-up is recommended.      Mild interstitial prominence is again noted.               COURSE & MEDICAL DECISION MAKING  Pertinent Labs & Imaging studies reviewed. (See chart for details)  1.  Monitor  2.  IV normal saline; IV fluids administered for clinical dehydration as well as requirement of n.p.o. status pending possible surgical intervention; reevaluation revealed stable status;  3.  Zofran, titrated  4.  Morphine, titrated    Laboratory studies: CBC shows white count of 17.4, 86% neutrophils, 4% lymphocytes, 8% monocytes, hemoglobin 15.4, hematocrit 46.5; CMP shows a random glucose of 114, BUN 24, otherwise within normal; coags within normal; troponin 0 0.01;    Discussion/consultation: At this time, the patient presents for a ground-level fall.  Patient has a right displaced right femoral neck fracture.  I spoke with the hospitalist on-call.  I spoke with orthopedic surgery on call.  The patient will be admitted for further monitoring, treatment, and care.    FINAL IMPRESSION  1. Closed right hip fracture, initial encounter (HCC)           PLAN  1.  The patient will be admitted for further monitoring, treatment, and care.    Electronically signed by: Guy G Gansert, 1/11/2019 2:38 PM

## 2019-01-11 NOTE — ED TRIAGE NOTES
Patient arrives to ED with complaints of right groin pain follow a GLF. She tripped over walker. Fall was witness. Happened at about 1230. Denies hitting head. Developed increasing pain over last few hrs. No leg shortening or rotation. CMS intact to right leg. Pt received 75 mg Fentanyl PTA by EMS

## 2019-01-12 ENCOUNTER — APPOINTMENT (OUTPATIENT)
Dept: RADIOLOGY | Facility: MEDICAL CENTER | Age: 79
DRG: 470 | End: 2019-01-12
Attending: ORTHOPAEDIC SURGERY
Payer: MEDICARE

## 2019-01-12 PROBLEM — D72.829 LEUKOCYTOSIS: Status: ACTIVE | Noted: 2019-01-12

## 2019-01-12 PROBLEM — S72.001A FRACTURE OF NECK OF RIGHT FEMUR (HCC): Status: ACTIVE | Noted: 2019-01-12

## 2019-01-12 PROBLEM — R74.8 ELEVATED LIVER ENZYMES: Status: ACTIVE | Noted: 2019-01-12

## 2019-01-12 LAB
ALBUMIN SERPL BCP-MCNC: 3.7 G/DL (ref 3.2–4.9)
ALBUMIN/GLOB SERPL: 1.7 G/DL
ALP SERPL-CCNC: 97 U/L (ref 30–99)
ALT SERPL-CCNC: 145 U/L (ref 2–50)
ANION GAP SERPL CALC-SCNC: 4 MMOL/L (ref 0–11.9)
AST SERPL-CCNC: 132 U/L (ref 12–45)
BASOPHILS # BLD AUTO: 0.3 % (ref 0–1.8)
BASOPHILS # BLD: 0.03 K/UL (ref 0–0.12)
BILIRUB SERPL-MCNC: 1.3 MG/DL (ref 0.1–1.5)
BUN SERPL-MCNC: 27 MG/DL (ref 8–22)
CALCIUM SERPL-MCNC: 9.1 MG/DL (ref 8.5–10.5)
CHLORIDE SERPL-SCNC: 109 MMOL/L (ref 96–112)
CO2 SERPL-SCNC: 26 MMOL/L (ref 20–33)
CREAT SERPL-MCNC: 0.83 MG/DL (ref 0.5–1.4)
EKG IMPRESSION: NORMAL
EOSINOPHIL # BLD AUTO: 0.06 K/UL (ref 0–0.51)
EOSINOPHIL NFR BLD: 0.6 % (ref 0–6.9)
ERYTHROCYTE [DISTWIDTH] IN BLOOD BY AUTOMATED COUNT: 47.1 FL (ref 35.9–50)
GLOBULIN SER CALC-MCNC: 2.2 G/DL (ref 1.9–3.5)
GLUCOSE SERPL-MCNC: 115 MG/DL (ref 65–99)
HCT VFR BLD AUTO: 40.2 % (ref 37–47)
HGB BLD-MCNC: 13.1 G/DL (ref 12–16)
IMM GRANULOCYTES # BLD AUTO: 0.05 K/UL (ref 0–0.11)
IMM GRANULOCYTES NFR BLD AUTO: 0.5 % (ref 0–0.9)
LYMPHOCYTES # BLD AUTO: 0.94 K/UL (ref 1–4.8)
LYMPHOCYTES NFR BLD: 8.8 % (ref 22–41)
MCH RBC QN AUTO: 30.5 PG (ref 27–33)
MCHC RBC AUTO-ENTMCNC: 32.6 G/DL (ref 33.6–35)
MCV RBC AUTO: 93.5 FL (ref 81.4–97.8)
MONOCYTES # BLD AUTO: 0.88 K/UL (ref 0–0.85)
MONOCYTES NFR BLD AUTO: 8.2 % (ref 0–13.4)
NEUTROPHILS # BLD AUTO: 8.75 K/UL (ref 2–7.15)
NEUTROPHILS NFR BLD: 81.6 % (ref 44–72)
NRBC # BLD AUTO: 0 K/UL
NRBC BLD-RTO: 0 /100 WBC
PLATELET # BLD AUTO: 180 K/UL (ref 164–446)
PMV BLD AUTO: 11.5 FL (ref 9–12.9)
POTASSIUM SERPL-SCNC: 4.2 MMOL/L (ref 3.6–5.5)
PROT SERPL-MCNC: 5.9 G/DL (ref 6–8.2)
RBC # BLD AUTO: 4.3 M/UL (ref 4.2–5.4)
SODIUM SERPL-SCNC: 139 MMOL/L (ref 135–145)
WBC # BLD AUTO: 10.7 K/UL (ref 4.8–10.8)

## 2019-01-12 PROCEDURE — 770001 HCHG ROOM/CARE - MED/SURG/GYN PRIV*

## 2019-01-12 PROCEDURE — 72170 X-RAY EXAM OF PELVIS: CPT

## 2019-01-12 PROCEDURE — 500367 HCHG DRAIN KIT, HEMOVAC: Performed by: ORTHOPAEDIC SURGERY

## 2019-01-12 PROCEDURE — 700101 HCHG RX REV CODE 250

## 2019-01-12 PROCEDURE — 502000 HCHG MISC OR IMPLANTS RC 0278: Performed by: ORTHOPAEDIC SURGERY

## 2019-01-12 PROCEDURE — 160002 HCHG RECOVERY MINUTES (STAT): Performed by: ORTHOPAEDIC SURGERY

## 2019-01-12 PROCEDURE — 501838 HCHG SUTURE GENERAL: Performed by: ORTHOPAEDIC SURGERY

## 2019-01-12 PROCEDURE — 0SRR01A REPLACEMENT OF RIGHT HIP JOINT, FEMORAL SURFACE WITH METAL SYNTHETIC SUBSTITUTE, UNCEMENTED, OPEN APPROACH: ICD-10-PCS | Performed by: ORTHOPAEDIC SURGERY

## 2019-01-12 PROCEDURE — 160042 HCHG SURGERY MINUTES - EA ADDL 1 MIN LEVEL 5: Performed by: ORTHOPAEDIC SURGERY

## 2019-01-12 PROCEDURE — 160031 HCHG SURGERY MINUTES - 1ST 30 MINS LEVEL 5: Performed by: ORTHOPAEDIC SURGERY

## 2019-01-12 PROCEDURE — 700111 HCHG RX REV CODE 636 W/ 250 OVERRIDE (IP)

## 2019-01-12 PROCEDURE — 160036 HCHG PACU - EA ADDL 30 MINS PHASE I: Performed by: ORTHOPAEDIC SURGERY

## 2019-01-12 PROCEDURE — 99233 SBSQ HOSP IP/OBS HIGH 50: CPT | Performed by: INTERNAL MEDICINE

## 2019-01-12 PROCEDURE — 160048 HCHG OR STATISTICAL LEVEL 1-5: Performed by: ORTHOPAEDIC SURGERY

## 2019-01-12 PROCEDURE — 700102 HCHG RX REV CODE 250 W/ 637 OVERRIDE(OP): Performed by: ANESTHESIOLOGY

## 2019-01-12 PROCEDURE — 85025 COMPLETE CBC W/AUTO DIFF WBC: CPT

## 2019-01-12 PROCEDURE — 700102 HCHG RX REV CODE 250 W/ 637 OVERRIDE(OP): Performed by: INTERNAL MEDICINE

## 2019-01-12 PROCEDURE — A9270 NON-COVERED ITEM OR SERVICE: HCPCS | Performed by: ANESTHESIOLOGY

## 2019-01-12 PROCEDURE — 700105 HCHG RX REV CODE 258

## 2019-01-12 PROCEDURE — 93005 ELECTROCARDIOGRAM TRACING: CPT | Performed by: ORTHOPAEDIC SURGERY

## 2019-01-12 PROCEDURE — 700101 HCHG RX REV CODE 250: Performed by: ANESTHESIOLOGY

## 2019-01-12 PROCEDURE — 700111 HCHG RX REV CODE 636 W/ 250 OVERRIDE (IP): Performed by: ORTHOPAEDIC SURGERY

## 2019-01-12 PROCEDURE — 36415 COLL VENOUS BLD VENIPUNCTURE: CPT

## 2019-01-12 PROCEDURE — 160009 HCHG ANES TIME/MIN: Performed by: ORTHOPAEDIC SURGERY

## 2019-01-12 PROCEDURE — 80053 COMPREHEN METABOLIC PANEL: CPT

## 2019-01-12 PROCEDURE — 93010 ELECTROCARDIOGRAM REPORT: CPT | Performed by: INTERNAL MEDICINE

## 2019-01-12 PROCEDURE — 502578 HCHG PACK, TOTAL HIP: Performed by: ORTHOPAEDIC SURGERY

## 2019-01-12 PROCEDURE — A9270 NON-COVERED ITEM OR SERVICE: HCPCS | Performed by: INTERNAL MEDICINE

## 2019-01-12 PROCEDURE — 160035 HCHG PACU - 1ST 60 MINS PHASE I: Performed by: ORTHOPAEDIC SURGERY

## 2019-01-12 PROCEDURE — 73501 X-RAY EXAM HIP UNI 1 VIEW: CPT | Mod: RT

## 2019-01-12 DEVICE — IMPLANT COCR 12/14 FEM HEAD 28 +0: Type: IMPLANTABLE DEVICE | Site: HIP | Status: FUNCTIONAL

## 2019-01-12 DEVICE — IMPLANTABLE DEVICE: Type: IMPLANTABLE DEVICE | Site: HIP | Status: FUNCTIONAL

## 2019-01-12 DEVICE — IMPLANT TANDEM BIPOLAR COCR 45OD 28ID: Type: IMPLANTABLE DEVICE | Site: HIP | Status: FUNCTIONAL

## 2019-01-12 RX ORDER — OXYCODONE HCL 5 MG/5 ML
5 SOLUTION, ORAL ORAL
Status: DISCONTINUED | OUTPATIENT
Start: 2019-01-12 | End: 2019-01-12 | Stop reason: HOSPADM

## 2019-01-12 RX ORDER — SODIUM CHLORIDE, SODIUM LACTATE, POTASSIUM CHLORIDE, CALCIUM CHLORIDE 600; 310; 30; 20 MG/100ML; MG/100ML; MG/100ML; MG/100ML
INJECTION, SOLUTION INTRAVENOUS CONTINUOUS
Status: DISCONTINUED | OUTPATIENT
Start: 2019-01-12 | End: 2019-01-12 | Stop reason: HOSPADM

## 2019-01-12 RX ORDER — HYDROMORPHONE HYDROCHLORIDE 1 MG/ML
0.4 INJECTION, SOLUTION INTRAMUSCULAR; INTRAVENOUS; SUBCUTANEOUS
Status: DISCONTINUED | OUTPATIENT
Start: 2019-01-12 | End: 2019-01-12 | Stop reason: HOSPADM

## 2019-01-12 RX ORDER — HYDROMORPHONE HYDROCHLORIDE 1 MG/ML
0.2 INJECTION, SOLUTION INTRAMUSCULAR; INTRAVENOUS; SUBCUTANEOUS
Status: DISCONTINUED | OUTPATIENT
Start: 2019-01-12 | End: 2019-01-12 | Stop reason: HOSPADM

## 2019-01-12 RX ORDER — OXYCODONE HYDROCHLORIDE 5 MG/1
5-10 TABLET ORAL EVERY 4 HOURS PRN
Status: DISCONTINUED | OUTPATIENT
Start: 2019-01-12 | End: 2019-01-14 | Stop reason: HOSPADM

## 2019-01-12 RX ORDER — MEPERIDINE HYDROCHLORIDE 25 MG/ML
12.5 INJECTION INTRAMUSCULAR; INTRAVENOUS; SUBCUTANEOUS
Status: DISCONTINUED | OUTPATIENT
Start: 2019-01-12 | End: 2019-01-12 | Stop reason: HOSPADM

## 2019-01-12 RX ORDER — HYDROMORPHONE HYDROCHLORIDE 1 MG/ML
0.1 INJECTION, SOLUTION INTRAMUSCULAR; INTRAVENOUS; SUBCUTANEOUS
Status: DISCONTINUED | OUTPATIENT
Start: 2019-01-12 | End: 2019-01-12 | Stop reason: HOSPADM

## 2019-01-12 RX ORDER — GABAPENTIN 300 MG/1
300 CAPSULE ORAL ONCE
Status: COMPLETED | OUTPATIENT
Start: 2019-01-12 | End: 2019-01-12

## 2019-01-12 RX ORDER — HALOPERIDOL 5 MG/ML
1 INJECTION INTRAMUSCULAR
Status: DISCONTINUED | OUTPATIENT
Start: 2019-01-12 | End: 2019-01-12 | Stop reason: HOSPADM

## 2019-01-12 RX ORDER — HYDRALAZINE HYDROCHLORIDE 20 MG/ML
5 INJECTION INTRAMUSCULAR; INTRAVENOUS
Status: DISCONTINUED | OUTPATIENT
Start: 2019-01-12 | End: 2019-01-12 | Stop reason: HOSPADM

## 2019-01-12 RX ORDER — SODIUM CHLORIDE 9 MG/ML
INJECTION, SOLUTION INTRAVENOUS
Status: COMPLETED
Start: 2019-01-12 | End: 2019-01-12

## 2019-01-12 RX ORDER — IPRATROPIUM BROMIDE AND ALBUTEROL SULFATE 2.5; .5 MG/3ML; MG/3ML
3 SOLUTION RESPIRATORY (INHALATION)
Status: COMPLETED | OUTPATIENT
Start: 2019-01-12 | End: 2019-01-12

## 2019-01-12 RX ORDER — ONDANSETRON 2 MG/ML
4 INJECTION INTRAMUSCULAR; INTRAVENOUS
Status: DISCONTINUED | OUTPATIENT
Start: 2019-01-12 | End: 2019-01-12 | Stop reason: HOSPADM

## 2019-01-12 RX ORDER — ACETAMINOPHEN 500 MG
1000 TABLET ORAL ONCE
Status: COMPLETED | OUTPATIENT
Start: 2019-01-12 | End: 2019-01-12

## 2019-01-12 RX ORDER — OXYCODONE HCL 5 MG/5 ML
10 SOLUTION, ORAL ORAL
Status: DISCONTINUED | OUTPATIENT
Start: 2019-01-12 | End: 2019-01-12 | Stop reason: HOSPADM

## 2019-01-12 RX ORDER — LORAZEPAM 2 MG/ML
0.5 INJECTION INTRAMUSCULAR
Status: DISCONTINUED | OUTPATIENT
Start: 2019-01-12 | End: 2019-01-12 | Stop reason: HOSPADM

## 2019-01-12 RX ORDER — MORPHINE SULFATE 4 MG/ML
2 INJECTION, SOLUTION INTRAMUSCULAR; INTRAVENOUS EVERY 4 HOURS PRN
Status: DISCONTINUED | OUTPATIENT
Start: 2019-01-12 | End: 2019-01-14 | Stop reason: HOSPADM

## 2019-01-12 RX ORDER — DIPHENHYDRAMINE HYDROCHLORIDE 50 MG/ML
12.5 INJECTION INTRAMUSCULAR; INTRAVENOUS
Status: DISCONTINUED | OUTPATIENT
Start: 2019-01-12 | End: 2019-01-12 | Stop reason: HOSPADM

## 2019-01-12 RX ORDER — ACETAMINOPHEN 10 MG/ML
1000 INJECTION, SOLUTION INTRAVENOUS EVERY 6 HOURS
Status: COMPLETED | OUTPATIENT
Start: 2019-01-12 | End: 2019-01-13

## 2019-01-12 RX ORDER — CEFAZOLIN SODIUM 1 G/50ML
1 INJECTION, SOLUTION INTRAVENOUS EVERY 8 HOURS
Status: COMPLETED | OUTPATIENT
Start: 2019-01-12 | End: 2019-01-13

## 2019-01-12 RX ADMIN — AMLODIPINE BESYLATE 5 MG: 5 TABLET ORAL at 06:26

## 2019-01-12 RX ADMIN — ACETAMINOPHEN 1000 MG: 10 INJECTION, SOLUTION INTRAVENOUS at 18:01

## 2019-01-12 RX ADMIN — CEFAZOLIN SODIUM 1 G: 1 INJECTION, SOLUTION INTRAVENOUS at 23:47

## 2019-01-12 RX ADMIN — LEVETIRACETAM 500 MG: 500 TABLET, FILM COATED ORAL at 18:06

## 2019-01-12 RX ADMIN — STANDARDIZED SENNA CONCENTRATE AND DOCUSATE SODIUM 2 TABLET: 8.6; 5 TABLET, FILM COATED ORAL at 06:25

## 2019-01-12 RX ADMIN — SODIUM CHLORIDE 500 ML: 9 INJECTION, SOLUTION INTRAVENOUS at 18:00

## 2019-01-12 RX ADMIN — METOPROLOL TARTRATE 25 MG: 25 TABLET, FILM COATED ORAL at 06:25

## 2019-01-12 RX ADMIN — LEVETIRACETAM 500 MG: 500 TABLET, FILM COATED ORAL at 06:25

## 2019-01-12 RX ADMIN — IPRATROPIUM BROMIDE AND ALBUTEROL SULFATE 3 ML: .5; 3 SOLUTION RESPIRATORY (INHALATION) at 14:38

## 2019-01-12 RX ADMIN — BRIMONIDINE TARTRATE 1 DROP: 2 SOLUTION OPHTHALMIC at 06:38

## 2019-01-12 RX ADMIN — ACETAMINOPHEN 1000 MG: 500 TABLET, FILM COATED ORAL at 10:54

## 2019-01-12 RX ADMIN — CEFAZOLIN SODIUM 1 G: 1 INJECTION, SOLUTION INTRAVENOUS at 18:05

## 2019-01-12 RX ADMIN — BRIMONIDINE TARTRATE 1 DROP: 2 SOLUTION OPHTHALMIC at 18:05

## 2019-01-12 RX ADMIN — LATANOPROST 1 DROP: 50 SOLUTION/ DROPS OPHTHALMIC at 21:26

## 2019-01-12 RX ADMIN — GABAPENTIN 300 MG: 300 CAPSULE ORAL at 10:54

## 2019-01-12 RX ADMIN — METOPROLOL TARTRATE 25 MG: 25 TABLET, FILM COATED ORAL at 18:06

## 2019-01-12 RX ADMIN — ACETAMINOPHEN 650 MG: 325 TABLET, FILM COATED ORAL at 06:25

## 2019-01-12 RX ADMIN — TIMOLOL MALEATE 1 DROP: 5 SOLUTION OPHTHALMIC at 06:38

## 2019-01-12 RX ADMIN — STANDARDIZED SENNA CONCENTRATE AND DOCUSATE SODIUM 2 TABLET: 8.6; 5 TABLET, FILM COATED ORAL at 18:06

## 2019-01-12 RX ADMIN — TIMOLOL MALEATE 1 DROP: 5 SOLUTION OPHTHALMIC at 18:04

## 2019-01-12 RX ADMIN — ACETAMINOPHEN 1000 MG: 10 INJECTION, SOLUTION INTRAVENOUS at 23:47

## 2019-01-12 ASSESSMENT — ENCOUNTER SYMPTOMS
FALLS: 1
SEIZURES: 0
MYALGIAS: 0
STRIDOR: 0
NECK PAIN: 0
BACK PAIN: 0
CONSTIPATION: 0
HALLUCINATIONS: 0
FOCAL WEAKNESS: 0
HEARTBURN: 0
CLAUDICATION: 0
TINGLING: 0
EYE DISCHARGE: 0
SPUTUM PRODUCTION: 0
EYE PAIN: 0
DEPRESSION: 0
NAUSEA: 0
EYE REDNESS: 0
DIZZINESS: 0
HEMOPTYSIS: 0
COUGH: 0
DIARRHEA: 0
ORTHOPNEA: 0
ABDOMINAL PAIN: 0
NERVOUS/ANXIOUS: 0
HEADACHES: 0
CHILLS: 0
FOCAL WEAKNESS: 1
FEVER: 0
PALPITATIONS: 0
PHOTOPHOBIA: 0
TREMORS: 0
BLURRED VISION: 0
VOMITING: 0
INSOMNIA: 0
DOUBLE VISION: 0
WEIGHT LOSS: 0
SHORTNESS OF BREATH: 0

## 2019-01-12 ASSESSMENT — LIFESTYLE VARIABLES: SUBSTANCE_ABUSE: 0

## 2019-01-12 ASSESSMENT — PAIN SCALES - GENERAL
PAINLEVEL_OUTOF10: 2
PAINLEVEL_OUTOF10: 0

## 2019-01-12 NOTE — PROGRESS NOTES
· 2 RN skin check complete.   · Devices in place: oxygen tubing.  · Skin assessed under devices: no redness or breakdown around ears  · All bony prominences (heels, elbows, coccyx) are intact and free of redness.  · No pressure ulcers found.  · The following interventions in place: pt able to micro-turn and shift self, offering frequent repositioning for comfort, pillows for support/repositioning.

## 2019-01-12 NOTE — ASSESSMENT & PLAN NOTE
Per chart review she has history of subdural hematoma and for that she is on seizure prophylaxis with Keppra as outpatient.  She underwent CT head today and did not show any acute abnormalities.

## 2019-01-12 NOTE — PROGRESS NOTES
Hospital Medicine Daily Progress Note    Date of Service  1/12/2019    Chief Complaint  78 y.o. female admitted 1/11/2019 with GLF    Hospital Course    77 y/o F with PMH of HTN, DLD here with above and found to have right hip fracture.      Interval Problem Update  No pain if she stay stills. Plan to have surgery today    Consultants/Specialty  Dr. Ennis    Code Status  full    Disposition  Remain on the floor    Review of Systems  Review of Systems   Constitutional: Negative for chills, fever and weight loss.   HENT: Negative for congestion and nosebleeds.    Eyes: Negative for blurred vision, pain, discharge and redness.   Respiratory: Negative for cough, sputum production, shortness of breath and stridor.    Cardiovascular: Negative for chest pain, palpitations and orthopnea.   Gastrointestinal: Negative for abdominal pain, diarrhea, heartburn, nausea and vomiting.   Genitourinary: Negative for dysuria, frequency and urgency.   Musculoskeletal: Positive for falls and joint pain. Negative for back pain, myalgias and neck pain.   Skin: Negative for itching and rash.   Neurological: Negative for dizziness, focal weakness, seizures and headaches.   Psychiatric/Behavioral: Negative for depression. The patient is not nervous/anxious and does not have insomnia.         Physical Exam  Temp:  [36.3 °C (97.3 °F)-37 °C (98.6 °F)] 36.3 °C (97.3 °F)  Pulse:  [67-97] 67  Resp:  [10-25] 15  BP: (107-138)/(57-96) 107/57    Physical Exam   Constitutional: She is oriented to person, place, and time. No distress.   HENT:   Head: Normocephalic and atraumatic.   Mouth/Throat: Oropharynx is clear and moist.   Eyes: Pupils are equal, round, and reactive to light. Conjunctivae and EOM are normal.   Neck: Normal range of motion. Neck supple. No tracheal deviation present. No thyromegaly present.   Cardiovascular: Normal rate and regular rhythm.    No murmur heard.  Pulmonary/Chest: Effort normal and breath sounds normal. No respiratory  distress. She has no wheezes.   Abdominal: Soft. Bowel sounds are normal. She exhibits no distension. There is no tenderness.   Musculoskeletal: She exhibits tenderness. She exhibits no edema.   Right hip pain   Neurological: She is alert and oriented to person, place, and time. No cranial nerve deficit.   Skin: Skin is warm and dry. She is not diaphoretic. No erythema.   Psychiatric: She has a normal mood and affect. Her behavior is normal. Thought content normal.       Fluids  No intake or output data in the 24 hours ending 01/12/19 0742    Laboratory  Recent Labs      01/11/19   1503  01/12/19   0357   WBC  17.4*  10.7   RBC  5.05  4.30   HEMOGLOBIN  15.4  13.1   HEMATOCRIT  46.5  40.2   MCV  92.1  93.5   MCH  30.5  30.5   MCHC  33.1*  32.6*   RDW  45.9  47.1   PLATELETCT  197  180   MPV  11.0  11.5     Recent Labs      01/11/19   1503  01/12/19   0357   SODIUM  140  139   POTASSIUM  3.9  4.2   CHLORIDE  105  109   CO2  25  26   GLUCOSE  114*  115*   BUN  24*  27*   CREATININE  0.81  0.83   CALCIUM  9.6  9.1     Recent Labs      01/11/19   1503   APTT  28.0   INR  1.03               Imaging  CT-HEAD W/O   Final Result         1.  No acute intracranial abnormality is identified, there are nonspecific white matter changes, commonly associated with small vessel ischemic disease.  Associated mild cerebral atrophy is noted.   2.  Atherosclerosis.      DX-FEMUR-2+ RIGHT   Final Result      Displaced subcapital right femoral neck fracture.      DX-PELVIS-1 OR 2 VIEWS   Final Result      Displaced right femoral neck fracture.      DX-CHEST-PORTABLE (1 VIEW)   Final Result      Patchy opacity in the peripheral right upper lobe may represent pneumonitis. Short interval follow-up is recommended.      Mild interstitial prominence is again noted.              Assessment/Plan  Fracture of neck of right femur (HCC)   Assessment & Plan    NPO  Pain control  Plan to have surgery today  PT/OT after surgery     Elevated liver  enzymes   Assessment & Plan    Follow cmp in am     Leukocytosis   Assessment & Plan    Likely reactive  Follow cbc     Subdural hematoma (HCC)- (present on admission)   Assessment & Plan    Per chart review she has history of subdural hematoma and for that she is on seizure prophylaxis with Keppra as outpatient.  She underwent CT head today and did not show any acute abnormalities.     HTN (hypertension), benign- (present on admission)   Assessment & Plan    Continue home blood pressure medications          VTE prophylaxis: scds

## 2019-01-12 NOTE — RESPIRATORY CARE
COPD EDUCATION by COPD CLINICAL EDUCATOR  1/12/2019 at 7:01 AM by Joyce Noguera     Patient reviewed by COPD education team. Patient does not qualify for COPD program.

## 2019-01-12 NOTE — PROGRESS NOTES
Pt arrived to unit on gurney from ED by transport. 2L oxygen. IV fluids infusing. 1 bag of belongings. Rating pain 5/10. Disoriented to time. Educated on call light use. Bed locked and in low position.

## 2019-01-12 NOTE — CARE PLAN
Problem: Communication  Goal: The ability to communicate needs accurately and effectively will improve  Outcome: PROGRESSING SLOWER THAN EXPECTED  Pt is confused since receiving pain medications, is able to verbalize needs but mostly with staff asking     Problem: Bowel/Gastric:  Goal: Normal bowel function is maintained or improved  Outcome: PROGRESSING AS EXPECTED  Pt is compliant with bowel regimen medications, states to not have a regular movements.

## 2019-01-12 NOTE — OR SURGEON
Immediate Post OP Note    PreOp Diagnosis: femoral neck fracture    PostOp Diagnosis: same    Procedure(s):  HIP HEMIARTHROPLASTY - Wound Class: Clean    Surgeon(s):  WOODY Silveira M.D.    Anesthesiologist/Type of Anesthesia:  Anesthesiologist: Kolton Maxwell M.D./General    Surgical Staff:  Circulator: Leticia Figureoa R.N.  Relief Circulator: Gabby Tejada R.N.  Scrub Person: Gama Peres    Specimens removed if any:  * No specimens in log *    Estimated Blood Loss: 50ccs    Findings: as described    Complications: none        1/12/2019 1:46 PM Kirill Sanchez M.D.

## 2019-01-12 NOTE — CONSULTS
DATE OF SERVICE:  01/11/2019    REQUESTING PHYSICIAN:  Dr. Guy Gansert.    CHIEF COMPLAINT:  Right hip pain.    HISTORY:  The patient is a 78-year-old woman who lives by herself.  She walks   with a walker.  She is mostly just walking around her house, does not walk   much out of the house.  She had a ground level fall today injuring her right   hip, was seen in the emergency room, found to have a femoral neck fracture.    Orthopedic consultation was requested.    ALLERGIES:  TAMOXIFEN.    MEDICATIONS:  Tylenol, amlodipine, vitamin C, Combigan eyedrops, Xalatan   eyedrops, metoprolol, Pred Forte and vitamin D.    PAST MEDICAL HISTORY:  Hypertension, glaucoma, breast cancer, liver   hemangioma, hyperlipidemia.    PAST SURGICAL HISTORY:  Cholecystectomy and bilateral mastectomy.    SOCIAL HISTORY:  The patient drinks alcohol occasionally.  She has a history   of tobacco use.  She lives in [place] by herself.    FAMILY HISTORY:  Positive for sarcoidosis and colon cancer in son.    REVIEW OF SYSTEMS:  No loss of consciousness, nausea, vomiting, diarrhea,   constipation, polyuria, dysuria, fevers, chills, weight loss, weight gain,   abdominal pain, chest pain or shortness of breath.    PHYSICAL EXAMINATION:  GENERAL:  The patient is in no acute distress.  VITAL SIGNS:  Blood pressure 135/74, heart rate 81, respirations 10,   temperature on presentation 98.6.  HEENT:  Normocephalic, atraumatic.  NECK:  Supple, nontender.  CHEST AND ABDOMEN:  Nontender.  No labored breathing.  EXTREMITIES:  The upper extremities and left lower extremity without   tenderness or deformity.  Right lower extremity shows shortening on external   rotation.  Skin over the right hip is intact.  She is able to dorsiflex and   plantarflex her toes.    LABORATORY DATA:  Include white blood cell count of 17,400, hematocrit 46.5%,   platelet count 197,000.  Sodium is 140, potassium 3.9, creatinine 0.81.  AST   and ALT are normal.  Albumin is 4.4.  INR is  1.03.    IMAGING:  Radiographs of the pelvis shows displaced right femoral neck   fracture.    ASSESSMENT:  Closed displaced right femoral neck fracture.    PLAN:  Recommended hemiarthroplasty given the patient's age and limited   activity level.  Risks were discussed with patient and these include bleeding,   infection, neurovascular injury, pain, stiffness, fracture, dislocation, leg   length discrepancy, implant failure, thromboembolic phenomena, anesthetic and   medical complications, etc.       ____________________________________     MD ABRIL CHEEK / SORAYA    DD:  01/11/2019 21:51:00  DT:  01/11/2019 22:22:44    D#:  [id number]  Job#:  [id number]

## 2019-01-12 NOTE — PROGRESS NOTES
Patient to room 317 from PACU. Sleepy but arouses easily. Oriented times 4. Dressing to right hip dry and intact, ice pack on. No c/o pain or nausea. O2 sats 94-97% on 3L.

## 2019-01-12 NOTE — THERAPY
"Occupational Therapy Evaluation Held    OT eval held 2/2 patient planned for hemiarthroplasty today 2/2 GLF at home resulting in R femoral neck fracture. Will continue to follow.     See \"Rehab Therapy-Acute\" Patient Summary Report for complete documentation.    "

## 2019-01-12 NOTE — H&P
Hospital Medicine History & Physical Note    Date of Service  1/11/2019    Primary Care Physician  Mayo Oswald M.D.    Consultants  Orthopedic surgery    Code Status  Full    Chief Complaint  Ground-level fall and right hip pain today    History of Presenting Illness  78 y.o. female with past medical history of hypertension and hyperlipidemia who presented presented to the hospital on 1/11/2019 complaint of ground-level fall and right hip pain evaluation.  She reported that she tripped and fell and a result of that she has been having pain on her right hip area.  She reported that she did not lose her consciousness and denies hitting her head.  She has redness on her left eye and it is chronic in nature per patient.  She denies any other acute complaints including chest pain, shortness of breath, nausea vomiting.    At the time of evaluation she looks confused and I discussed with RN and he mentioned that patient received a dose of IV morphine and that could be the reason of it but to evaluate further I ordered CT head without contrast which did not show any acute abnormalities.    I discussed about this case with Dr. Gansert    Review of Systems  Review of Systems   Constitutional: Negative for chills, fever and weight loss.   HENT: Negative for ear discharge, ear pain, hearing loss, nosebleeds and tinnitus.    Eyes: Negative for blurred vision, double vision, photophobia and discharge.   Respiratory: Negative for cough, hemoptysis, sputum production and shortness of breath.    Cardiovascular: Negative for chest pain, palpitations, orthopnea, claudication and leg swelling.   Gastrointestinal: Negative for abdominal pain, constipation, diarrhea, heartburn, nausea and vomiting.   Genitourinary: Negative for dysuria, frequency, hematuria and urgency.   Musculoskeletal: Positive for joint pain.   Skin: Negative for rash.   Neurological: Positive for focal weakness (Right lower extremity due to pain). Negative for  dizziness, tingling, tremors and headaches.   Psychiatric/Behavioral: Negative for hallucinations and substance abuse.   All other systems reviewed and are negative.      Past Medical History  Past Medical History:   Diagnosis Date   • Hemangioma of liver benign 8/19/2013   • Glaucoma - Dr. Lima 4/20/2010   • Breast cancer (HCC) 2004    treated with radiation and resection   • Hemangioma of liver    • HTN (hypertension), benign    • Hyperlipidemia    • Hypertension    • Osteopenia    • Ptosis of eyelid     right   • Tobacco abuse     chronic       Surgical History   has a past surgical history that includes mastectomy bilateral subq; cholecystectomy; mastectomy bilateral subq; and cholecystectomy.     Family History  family history includes Autoimmune Disease in her son; Cancer (age of onset: 48) in her son; No Known Problems in her daughter, son, and son.     Social History   reports that she quit smoking about 6 months ago. Her smoking use included Cigarettes. She started smoking about 62 years ago. She has a 30.50 pack-year smoking history. She has never used smokeless tobacco. She reports that she drinks about 0.5 oz of alcohol per week . She reports that she does not use drugs.    Allergies  Allergies   Allergen Reactions   • Tamoxifen Anaphylaxis     bleeding       Medications  Prior to Admission Medications   Prescriptions Last Dose Informant Patient Reported? Taking?   Brimonidine Tartrate-Timolol (COMBIGAN) 0.2-0.5 % Solution 1/10/2019 at pm Patient's Home Pharmacy Yes No   Sig: Place 1 Drop in both eyes 2 Times a Day.   amLODIPine (NORVASC) 5 MG Tab 1/10/2019 at am Patient's Home Pharmacy Yes Yes   Sig: Take 5 mg by mouth every day.   latanoprost (XALATAN) 0.005 % Solution 1/10/2019 at pm Patient's Home Pharmacy Yes Yes   Sig: Place 1 Drop in left eye every evening.   levETIRAcetam (KEPPRA) 500 MG Tab 1/10/2019 at pm Patient's Home Pharmacy Yes Yes   Sig: Take 500 mg by mouth 2 times a day.    metoprolol (LOPRESSOR) 25 MG Tab 1/10/2019 at pm Patient's Home Pharmacy Yes Yes   Sig: Take 25 mg by mouth 2 times a day.      Facility-Administered Medications: None       Physical Exam  Temp:  [37 °C (98.6 °F)] 37 °C (98.6 °F)  Pulse:  [80-97] 81  Resp:  [10-25] 15  BP: (138)/(81) 138/81    Physical Exam   Constitutional: No distress.   HENT:   Head: Normocephalic and atraumatic.   Eyes: Pupils are equal, round, and reactive to light. Right eye exhibits no discharge. Left eye exhibits no discharge.   Neck: Normal range of motion.   Cardiovascular: Normal rate and regular rhythm.    Murmur heard.  Pulmonary/Chest: Effort normal and breath sounds normal. No respiratory distress. She has no wheezes. She has no rales.   Abdominal: Soft. Bowel sounds are normal. She exhibits no distension. There is no tenderness.   Musculoskeletal: She exhibits tenderness. She exhibits no edema.   Decreased range of motion on right hip.   Neurological: She is alert. No cranial nerve deficit.   She was alert but somewhat confused at the time of evaluation   Skin: Skin is warm and dry. She is not diaphoretic.   Psychiatric: Her behavior is normal.       Laboratory:  Recent Labs      01/11/19   1503   WBC  17.4*   RBC  5.05   HEMOGLOBIN  15.4   HEMATOCRIT  46.5   MCV  92.1   MCH  30.5   MCHC  33.1*   RDW  45.9   PLATELETCT  197   MPV  11.0     Recent Labs      01/11/19   1503   SODIUM  140   POTASSIUM  3.9   CHLORIDE  105   CO2  25   GLUCOSE  114*   BUN  24*   CREATININE  0.81   CALCIUM  9.6     Recent Labs      01/11/19   1503   ALTSGPT  10   ASTSGOT  15   ALKPHOSPHAT  86   TBILIRUBIN  0.9   GLUCOSE  114*     Recent Labs      01/11/19   1503   APTT  28.0   INR  1.03             Recent Labs      01/11/19   1503   TROPONINI  0.01       Urinalysis:    No results found     Imaging:  CT-HEAD W/O   Final Result         1.  No acute intracranial abnormality is identified, there are nonspecific white matter changes, commonly associated with  small vessel ischemic disease.  Associated mild cerebral atrophy is noted.   2.  Atherosclerosis.      DX-FEMUR-2+ RIGHT   Final Result      Displaced subcapital right femoral neck fracture.      DX-PELVIS-1 OR 2 VIEWS   Final Result      Displaced right femoral neck fracture.      DX-CHEST-PORTABLE (1 VIEW)   Final Result      Patchy opacity in the peripheral right upper lobe may represent pneumonitis. Short interval follow-up is recommended.      Mild interstitial prominence is again noted.               Assessment/Plan:  I anticipate this patient will require at least two midnights for appropriate medical management, necessitating inpatient admission.    Fracture of neck of right femur (HCC)   Assessment & Plan    And found to have displaced subcapital right femoral neck fracture  Orthopedic surgery evaluated her and recommended hemiarthroplasty.  Admit to orthopedic floor and provide her pain control.     Leukocytosis   Assessment & Plan    She found to have leukocytosis and is most likely reactionary to fall and hip fracture  Chest x-ray showed pneumonitis she did not show any signs of acute infection including fever, tachycardia and cough  Monitor if she will start showing signs of infection she may need antibiotic     Subdural hematoma (HCC)- (present on admission)   Assessment & Plan    Per chart review she has history of subdural hematoma and for that she is on seizure prophylaxis with Keppra as outpatient.  I am not sure for how long she needs to be on Keppra for seizure prophylaxis  She underwent CT head today and did not show any acute abnormalities.     HTN (hypertension), benign- (present on admission)   Assessment & Plan    Continue home blood pressure medications         VTE prophylaxis: SCDs as she is going to have surgery

## 2019-01-12 NOTE — OR NURSING
Pt to recovery, sleeping, resps unlabored. Airway dc'd shortly after arrival. Expiratory wheezes and some crackles auscultated over upper lung fields. Duoneb administered in PACU. Resps unlabored at this time. Pt denies pain or nausea. Tolerating sips of clears. Dressing to R hip CDI, cold pack in place. Ordered post-op xray completed in PACU. Pt disoriented to location and date in PACU but is calm and re-orients easily. VSS

## 2019-01-12 NOTE — OP REPORT
DATE OF SERVICE:  01/12/2019    PREOPERATIVE DIAGNOSIS:  Displaced femoral neck fracture, left hip.    POSTOPERATIVE DIAGNOSIS:  Displaced femoral neck fracture, left hip.    OPERATION PERFORMED:  Left press-fit hemiarthroplasty.    COMPONENTS UTILIZED:  [employer] size 16 Synergy with a 45 standard neck   length, standard offset bipolar.    SURGEON:  Kirill Sanchez MD    ASSISTANT:  Hernando Duarte DO, trauma fellow    COMPLICATIONS:  None.    APPROACH:  Anterolateral.    MEDICATIONS UTILIZED:  Ancef, tranexamic acid, and Ofirmev.    BLOOD LOSS:  50 mL    DESCRIPTION OF OPERATION:  The patient was brought to the operating room,   awake and alert, placed on the operating room table in supine position.  Leg   lengths were checked and then she was rolled into lateral position routine for   total hip arthroplasty.  The hip was then shaved, scrubbed, prepped and   draped in normal sterile routine fashion.  Spacesuits were utilized.  The door   was marked arthroplasty case to decrease any unnecessary room traffic.    Time-out and the operation began.  Anterolateral approach to the hip was   utilized.  Straight incision over the trochanter and subcutaneous tissue   dissected down to the tensor fascia, which was opened and then the inferior   third of the gluteus medius and minimus was dissected off of the anterior   capsule.  Retractors were placed.  The capsule was then T'd open, easily   identifying the fracture interface.  A 1.5 cm above the palpable lesser   trochanter, femoral neck cut was made and then using the T handled corkscrew,   the femoral head was easily removed from the acetabulum.  There was no   significant acetabular wear or degenerative changes; therefore, we chose a   hemiarthroplasty.  Checking the size of the femoral head, it easily passed   through a 45 template.    Cookie cutter, hand reamers, and then hand broaches to a 16 press-fit   colorless Synergy tapped into position without  complication.  We assembled a   standard neck length 45 mm bipolar head, copiously irrigated the Stevens taper.    We then checked the acetabulum for any debris, copiously irrigated, reduced   the hip, and the operation was completed.    We now closed the capsule with #1 Vicryl.  We brought the abductor back into   its trochanteric ridge with #5 Ti-Cron through drill holes, #3 sutures, and   then we reinforced it with a row of #1 Vicryl.  Tensor fascia was closed with   #1 Vicryl, 2-0 subQ, skin staples.  We injected with our essence of Toradol,   morphine, saline, and then used our standard silver dressing.  At the   completion of procedure, the hip was stable.  Leg lengths were equal.  No   complications.  Blood loss 50 mL.  There is no family to talk with   postoperatively.       ____________________________________     MD BHUMI CALDERÓN / SORAYA    DD:  01/12/2019 13:40:23  DT:  01/12/2019 14:24:58    D#:  [id number]  Job#:  [id number]

## 2019-01-13 LAB
BASOPHILS # BLD AUTO: 0.2 % (ref 0–1.8)
BASOPHILS # BLD: 0.03 K/UL (ref 0–0.12)
EOSINOPHIL # BLD AUTO: 0.12 K/UL (ref 0–0.51)
EOSINOPHIL NFR BLD: 0.9 % (ref 0–6.9)
ERYTHROCYTE [DISTWIDTH] IN BLOOD BY AUTOMATED COUNT: 47.8 FL (ref 35.9–50)
HCT VFR BLD AUTO: 43.2 % (ref 37–47)
HGB BLD-MCNC: 13.8 G/DL (ref 12–16)
IMM GRANULOCYTES # BLD AUTO: 0.04 K/UL (ref 0–0.11)
IMM GRANULOCYTES NFR BLD AUTO: 0.3 % (ref 0–0.9)
LYMPHOCYTES # BLD AUTO: 1.1 K/UL (ref 1–4.8)
LYMPHOCYTES NFR BLD: 8.6 % (ref 22–41)
MCH RBC QN AUTO: 30 PG (ref 27–33)
MCHC RBC AUTO-ENTMCNC: 31.9 G/DL (ref 33.6–35)
MCV RBC AUTO: 93.9 FL (ref 81.4–97.8)
MONOCYTES # BLD AUTO: 1.08 K/UL (ref 0–0.85)
MONOCYTES NFR BLD AUTO: 8.4 % (ref 0–13.4)
NEUTROPHILS # BLD AUTO: 10.49 K/UL (ref 2–7.15)
NEUTROPHILS NFR BLD: 81.6 % (ref 44–72)
NRBC # BLD AUTO: 0 K/UL
NRBC BLD-RTO: 0 /100 WBC
PLATELET # BLD AUTO: 192 K/UL (ref 164–446)
PMV BLD AUTO: 11.4 FL (ref 9–12.9)
RBC # BLD AUTO: 4.6 M/UL (ref 4.2–5.4)
WBC # BLD AUTO: 12.9 K/UL (ref 4.8–10.8)

## 2019-01-13 PROCEDURE — 770001 HCHG ROOM/CARE - MED/SURG/GYN PRIV*

## 2019-01-13 PROCEDURE — 99233 SBSQ HOSP IP/OBS HIGH 50: CPT | Performed by: INTERNAL MEDICINE

## 2019-01-13 PROCEDURE — A9270 NON-COVERED ITEM OR SERVICE: HCPCS | Performed by: INTERNAL MEDICINE

## 2019-01-13 PROCEDURE — 97166 OT EVAL MOD COMPLEX 45 MIN: CPT

## 2019-01-13 PROCEDURE — 97162 PT EVAL MOD COMPLEX 30 MIN: CPT

## 2019-01-13 PROCEDURE — 36415 COLL VENOUS BLD VENIPUNCTURE: CPT

## 2019-01-13 PROCEDURE — 85025 COMPLETE CBC W/AUTO DIFF WBC: CPT

## 2019-01-13 PROCEDURE — 700102 HCHG RX REV CODE 250 W/ 637 OVERRIDE(OP): Performed by: INTERNAL MEDICINE

## 2019-01-13 PROCEDURE — 700111 HCHG RX REV CODE 636 W/ 250 OVERRIDE (IP): Performed by: ORTHOPAEDIC SURGERY

## 2019-01-13 RX ADMIN — LATANOPROST 1 DROP: 50 SOLUTION/ DROPS OPHTHALMIC at 21:44

## 2019-01-13 RX ADMIN — METOPROLOL TARTRATE 25 MG: 25 TABLET, FILM COATED ORAL at 05:38

## 2019-01-13 RX ADMIN — TIMOLOL MALEATE 1 DROP: 5 SOLUTION OPHTHALMIC at 17:50

## 2019-01-13 RX ADMIN — STANDARDIZED SENNA CONCENTRATE AND DOCUSATE SODIUM 2 TABLET: 8.6; 5 TABLET, FILM COATED ORAL at 05:38

## 2019-01-13 RX ADMIN — METOPROLOL TARTRATE 25 MG: 25 TABLET, FILM COATED ORAL at 17:52

## 2019-01-13 RX ADMIN — TIMOLOL MALEATE 1 DROP: 5 SOLUTION OPHTHALMIC at 05:38

## 2019-01-13 RX ADMIN — AMLODIPINE BESYLATE 5 MG: 5 TABLET ORAL at 05:38

## 2019-01-13 RX ADMIN — BRIMONIDINE TARTRATE 1 DROP: 2 SOLUTION OPHTHALMIC at 05:38

## 2019-01-13 RX ADMIN — LEVETIRACETAM 500 MG: 500 TABLET, FILM COATED ORAL at 17:52

## 2019-01-13 RX ADMIN — BRIMONIDINE TARTRATE 1 DROP: 2 SOLUTION OPHTHALMIC at 17:50

## 2019-01-13 RX ADMIN — LEVETIRACETAM 500 MG: 500 TABLET, FILM COATED ORAL at 05:38

## 2019-01-13 RX ADMIN — ACETAMINOPHEN 650 MG: 325 TABLET, FILM COATED ORAL at 21:43

## 2019-01-13 RX ADMIN — ACETAMINOPHEN 1000 MG: 10 INJECTION, SOLUTION INTRAVENOUS at 05:38

## 2019-01-13 ASSESSMENT — PAIN SCALES - GENERAL: PAINLEVEL_OUTOF10: 0

## 2019-01-13 ASSESSMENT — ENCOUNTER SYMPTOMS
NERVOUS/ANXIOUS: 0
HEARTBURN: 0
DIARRHEA: 0
MYALGIAS: 0
CHILLS: 0
EYE REDNESS: 0
HEADACHES: 0
STRIDOR: 0
BLURRED VISION: 0
EYE PAIN: 0
FALLS: 1
DIZZINESS: 0
INSOMNIA: 0
FOCAL WEAKNESS: 0
NAUSEA: 0
DEPRESSION: 0
BACK PAIN: 0
EYE DISCHARGE: 0
SHORTNESS OF BREATH: 0
VOMITING: 0
ORTHOPNEA: 0
WEIGHT LOSS: 0
FEVER: 0
COUGH: 0

## 2019-01-13 ASSESSMENT — COGNITIVE AND FUNCTIONAL STATUS - GENERAL
TURNING FROM BACK TO SIDE WHILE IN FLAT BAD: A LITTLE
SUGGESTED CMS G CODE MODIFIER MOBILITY: CK
MOVING FROM LYING ON BACK TO SITTING ON SIDE OF FLAT BED: A LITTLE
DRESSING REGULAR UPPER BODY CLOTHING: A LITTLE
CLIMB 3 TO 5 STEPS WITH RAILING: TOTAL
DRESSING REGULAR LOWER BODY CLOTHING: A LOT
SUGGESTED CMS G CODE MODIFIER DAILY ACTIVITY: CK
STANDING UP FROM CHAIR USING ARMS: A LITTLE
DAILY ACTIVITIY SCORE: 18
HELP NEEDED FOR BATHING: A LOT
MOVING TO AND FROM BED TO CHAIR: A LITTLE
MOBILITY SCORE: 16
WALKING IN HOSPITAL ROOM: A LITTLE
TOILETING: A LITTLE

## 2019-01-13 ASSESSMENT — GAIT ASSESSMENTS
ASSISTIVE DEVICE: FRONT WHEEL WALKER
GAIT LEVEL OF ASSIST: MINIMAL ASSIST
DISTANCE (FEET): 12

## 2019-01-13 ASSESSMENT — ACTIVITIES OF DAILY LIVING (ADL): TOILETING: INDEPENDENT

## 2019-01-13 NOTE — THERAPY
"Occupational Therapy Evaluation completed.   Functional Status:    Pleasant 79 y/o female admitted for GLF, now s/p R press-fit hemiarthroplasty. WBAT. Pt completed sit><stand with Min A, ambulated to the bathroom using FWW, toilet xfer with Mod A, mod VCs for pericare, supv and extra time to doff socks, max A to don socks, min A to don new gown. Mod A for BLEs B2B. Pt presents with decreased strength, balance, and activity tolerance; she is also limited by diminished cognition. She is disoriented, forgetful, and requires consistent cues for sequencing. Will continue working with her in this setting.     Plan of Care: Will benefit from Occupational Therapy 3 times per week  Discharge Recommendations:  Equipment: Will Continue to Assess for Equipment Needs. Post-acute therapy Recommend inpatient transitional care services for continued occupational therapy services.       See \"Rehab Therapy-Acute\" Patient Summary Report for complete documentation.    "

## 2019-01-13 NOTE — OP REPORT
DATE OF SERVICE:  01/13/2019    ADDENDUM    THE OPERATIVE REPORT DICTATED YESTERDAY SAYS LEFT HIP THAT SHOULD BE CHANGED   TO THE RIGHT HIP.  All references intraoperatively should be to the right hip.       ____________________________________     MD BHUMI CALDERÓN / SORAYA    DD:  01/13/2019 07:54:34  DT:  01/13/2019 08:05:15    D#:  [id number]  Job#:  [id number]

## 2019-01-13 NOTE — THERAPY
"Physical Therapy Evaluation completed.   Bed Mobility:  Supine to Sit: Minimal Assist  Transfers: Sit to Stand: Contact Guard Assist  Gait: Level Of Assist: Minimal Assist with Front-Wheel Walker       Plan of Care: Will benefit from Physical Therapy 4 times per week  Discharge Recommendations: Equipment: Will Continue to Assess for Equipment Needs. Recommend inpatient transitional care services for continued physical therapy services.      See \"Rehab Therapy-Acute\" Patient Summary Report for complete documentation.     Pt was recenlty admitted for a GLF and presented with R hip fracture and is now s/p hemiarthoplasty with WBAT precautions in place for RLE. Pt presented with impaired balance, impaired coordination, impaired motor planning/sequencing, impaired gait, poor saftey awareness, pain, weakness, and dec activity tolerance. Pt appears to be most limited due to cog/motor delayes and sequencing during functional mobility. Pt requires constant cues and redirection during functional mobility for safe transfers and ambulation. Pt was able to demonstrate Min A to CGA for all functional mobility at this time w/FWW use. Per RN, pt was AxOx2 post op and has improved slowly, however, continues to be somewhat dealyed at times. With current functional mobility and delays, will recommend post acute therapy prior to d/c home given current objective findings, age, IPLOF, and limited social support. Will continue to follow.   "

## 2019-01-13 NOTE — PROGRESS NOTES
Hospital Medicine Daily Progress Note    Date of Service  1/13/2019    Chief Complaint  78 y.o. female admitted 1/11/2019 with GLF    Hospital Course    77 y/o F with PMH of HTN, DLD here with above and found to have right hip fracture.      Interval Problem Update  POD 1. No pain if she doesn't move. PT/OT evaluating today.    Consultants/Specialty  Dr. Ennis    Code Status  full    Disposition  Remain on the floor    Review of Systems  Review of Systems   Constitutional: Negative for chills, fever and weight loss.   HENT: Negative for congestion and nosebleeds.    Eyes: Negative for blurred vision, pain, discharge and redness.   Respiratory: Negative for cough, shortness of breath and stridor.    Cardiovascular: Negative for chest pain and orthopnea.   Gastrointestinal: Negative for diarrhea, heartburn, nausea and vomiting.   Genitourinary: Negative for dysuria and frequency.   Musculoskeletal: Positive for falls and joint pain. Negative for back pain and myalgias.   Skin: Negative for itching and rash.   Neurological: Negative for dizziness, focal weakness and headaches.   Psychiatric/Behavioral: Negative for depression. The patient is not nervous/anxious and does not have insomnia.         Physical Exam  Temp:  [36.1 °C (96.9 °F)-36.6 °C (97.9 °F)] 36.3 °C (97.4 °F)  Pulse:  [53-74] 74  Resp:  [12-18] 15  BP: (105-132)/(59-73) 131/72    Physical Exam   Constitutional: She is oriented to person, place, and time. No distress.   HENT:   Head: Normocephalic and atraumatic.   Eyes: Pupils are equal, round, and reactive to light. EOM are normal.   Neck: Normal range of motion. No tracheal deviation present. No thyromegaly present.   Cardiovascular: Normal rate and regular rhythm.    No murmur heard.  Pulmonary/Chest: Effort normal and breath sounds normal. No respiratory distress.   Abdominal: Soft. Bowel sounds are normal. She exhibits no distension. There is no tenderness.   Musculoskeletal: She exhibits tenderness.  She exhibits no edema.   Right hip pain   Neurological: She is alert and oriented to person, place, and time. No cranial nerve deficit.   Skin: Skin is warm and dry. She is not diaphoretic. No erythema.   Psychiatric: She has a normal mood and affect. Her behavior is normal. Thought content normal.       Fluids    Intake/Output Summary (Last 24 hours) at 01/13/19 0717  Last data filed at 01/12/19 1400   Gross per 24 hour   Intake             1200 ml   Output                0 ml   Net             1200 ml       Laboratory  Recent Labs      01/11/19   1503  01/12/19   0357   WBC  17.4*  10.7   RBC  5.05  4.30   HEMOGLOBIN  15.4  13.1   HEMATOCRIT  46.5  40.2   MCV  92.1  93.5   MCH  30.5  30.5   MCHC  33.1*  32.6*   RDW  45.9  47.1   PLATELETCT  197  180   MPV  11.0  11.5     Recent Labs      01/11/19   1503  01/12/19   0357   SODIUM  140  139   POTASSIUM  3.9  4.2   CHLORIDE  105  109   CO2  25  26   GLUCOSE  114*  115*   BUN  24*  27*   CREATININE  0.81  0.83   CALCIUM  9.6  9.1     Recent Labs      01/11/19   1503   APTT  28.0   INR  1.03               Imaging  DX-PELVIS-1 OR 2 VIEWS   Final Result      Satisfactory positioning new right hip hemiarthroplasty on single AP view.      CT-HEAD W/O   Final Result         1.  No acute intracranial abnormality is identified, there are nonspecific white matter changes, commonly associated with small vessel ischemic disease.  Associated mild cerebral atrophy is noted.   2.  Atherosclerosis.      DX-FEMUR-2+ RIGHT   Final Result      Displaced subcapital right femoral neck fracture.      DX-PELVIS-1 OR 2 VIEWS   Final Result      Displaced right femoral neck fracture.      DX-CHEST-PORTABLE (1 VIEW)   Final Result      Patchy opacity in the peripheral right upper lobe may represent pneumonitis. Short interval follow-up is recommended.      Mild interstitial prominence is again noted.              Assessment/Plan  Fracture of neck of right femur (HCC)   Assessment & Plan     Pain control  POD 1: hemiarthroplasty  PT/OT  Pain control     Elevated liver enzymes   Assessment & Plan    Follow cmp in am     Leukocytosis   Assessment & Plan    Likely reactive  improving  Follow cbc     Subdural hematoma (HCC)- (present on admission)   Assessment & Plan    Per chart review she has history of subdural hematoma and for that she is on seizure prophylaxis with Keppra as outpatient.  She underwent CT head today and did not show any acute abnormalities.     HTN (hypertension), benign- (present on admission)   Assessment & Plan    Continue home blood pressure medications          VTE prophylaxis: scds

## 2019-01-14 VITALS
DIASTOLIC BLOOD PRESSURE: 70 MMHG | HEART RATE: 78 BPM | SYSTOLIC BLOOD PRESSURE: 131 MMHG | RESPIRATION RATE: 17 BRPM | HEIGHT: 64 IN | TEMPERATURE: 97.2 F | WEIGHT: 118 LBS | OXYGEN SATURATION: 90 % | BODY MASS INDEX: 20.14 KG/M2

## 2019-01-14 PROCEDURE — 700102 HCHG RX REV CODE 250 W/ 637 OVERRIDE(OP): Performed by: INTERNAL MEDICINE

## 2019-01-14 PROCEDURE — 99239 HOSP IP/OBS DSCHRG MGMT >30: CPT | Performed by: INTERNAL MEDICINE

## 2019-01-14 PROCEDURE — A9270 NON-COVERED ITEM OR SERVICE: HCPCS | Performed by: INTERNAL MEDICINE

## 2019-01-14 RX ORDER — AMOXICILLIN 250 MG
2 CAPSULE ORAL 2 TIMES DAILY
Qty: 30 TAB | Refills: 0 | Status: SHIPPED | OUTPATIENT
Start: 2019-01-14 | End: 2019-02-08 | Stop reason: CLARIF

## 2019-01-14 RX ORDER — OXYCODONE HYDROCHLORIDE 5 MG/1
5-10 TABLET ORAL EVERY 4 HOURS PRN
Qty: 10 TAB | Refills: 0 | Status: SHIPPED | OUTPATIENT
Start: 2019-01-14 | End: 2019-01-16

## 2019-01-14 RX ADMIN — TIMOLOL MALEATE 1 DROP: 5 SOLUTION OPHTHALMIC at 06:00

## 2019-01-14 RX ADMIN — ACETAMINOPHEN 650 MG: 325 TABLET, FILM COATED ORAL at 12:08

## 2019-01-14 RX ADMIN — AMLODIPINE BESYLATE 5 MG: 5 TABLET ORAL at 05:49

## 2019-01-14 RX ADMIN — LEVETIRACETAM 500 MG: 500 TABLET, FILM COATED ORAL at 12:08

## 2019-01-14 RX ADMIN — STANDARDIZED SENNA CONCENTRATE AND DOCUSATE SODIUM 2 TABLET: 8.6; 5 TABLET, FILM COATED ORAL at 05:49

## 2019-01-14 RX ADMIN — BRIMONIDINE TARTRATE 1 DROP: 2 SOLUTION OPHTHALMIC at 06:00

## 2019-01-14 RX ADMIN — ACETAMINOPHEN 650 MG: 325 TABLET, FILM COATED ORAL at 05:49

## 2019-01-14 RX ADMIN — LEVETIRACETAM 500 MG: 500 TABLET, FILM COATED ORAL at 05:49

## 2019-01-14 RX ADMIN — METOPROLOL TARTRATE 25 MG: 25 TABLET, FILM COATED ORAL at 05:49

## 2019-01-14 ASSESSMENT — PAIN SCALES - WONG BAKER
WONGBAKER_NUMERICALRESPONSE: HURTS JUST A LITTLE BIT
WONGBAKER_NUMERICALRESPONSE: HURTS JUST A LITTLE BIT
WONGBAKER_NUMERICALRESPONSE: DOESN'T HURT AT ALL
WONGBAKER_NUMERICALRESPONSE: DOESN'T HURT AT ALL

## 2019-01-14 ASSESSMENT — ENCOUNTER SYMPTOMS
DIARRHEA: 0
HEARTBURN: 0
WEIGHT LOSS: 0
BLURRED VISION: 0
FALLS: 1
SHORTNESS OF BREATH: 0
EYE PAIN: 0
MYALGIAS: 0
NERVOUS/ANXIOUS: 0
NAUSEA: 0
FOCAL WEAKNESS: 0
BACK PAIN: 0
STRIDOR: 0
FEVER: 0
COUGH: 0
DEPRESSION: 0
CHILLS: 0
DIZZINESS: 0
ORTHOPNEA: 0
PALPITATIONS: 0

## 2019-01-14 NOTE — DISCHARGE SUMMARY
Discharge Summary    CHIEF COMPLAINT ON ADMISSION  Chief Complaint   Patient presents with   • Groin Pain   • T-5000 GLF       Reason for Admission  Fall; hip pain     Admission Date  1/11/2019    CODE STATUS  Full Code    HPI & HOSPITAL COURSE       79 y/o F with PMH of HTN, DLD here with a fall and hip pain and found to have right hip fracture on imaging.  Surgery was consulted and did hemiarthroplasty without complication. She was seen by PT/OT and recommended SNF.  She was accepted to SNF today for rehab.    Therefore, she is discharged in guarded and stable condition to home with close outpatient follow-up.    The patient met 2-midnight criteria for an inpatient stay at the time of discharge.    Discharge Date  1/14/19    FOLLOW UP ITEMS POST DISCHARGE      DISCHARGE DIAGNOSES  Active Problems:    Fracture of neck of right femur (HCC) POA: Yes    HTN (hypertension), benign POA: Yes    Subdural hematoma (HCC) POA: Yes    Leukocytosis POA: Unknown    Elevated liver enzymes POA: Unknown  Resolved Problems:    * No resolved hospital problems. *      FOLLOW UP  No future appointments.  Mayo Oswald M.D.  [address]  [address]  [place] [address]  [phone]    In 1 week      Kirill Rose M.D.  [address] [facility]  [place] [address]  [phone]    In 1 week        MEDICATIONS ON DISCHARGE     Medication List      START taking these medications      Instructions   enoxaparin 40 MG/0.4ML Soln inj  Commonly known as:  LOVENOX   Inject 40 mg as instructed every day.  Dose:  40 mg     oxyCODONE immediate-release 5 MG Tabs  Commonly known as:  ROXICODONE   Take 1-2 Tabs by mouth every four hours as needed for up to 2 days.  Dose:  5-10 mg     senna-docusate 8.6-50 MG Tabs  Commonly known as:  PERICOLACE or SENOKOT S   Take 2 Tabs by mouth 2 Times a Day.  Dose:  2 Tab        CONTINUE taking these medications      Instructions   amLODIPine 5 MG Tabs  Commonly known as:  NORVASC   Take 5 mg by mouth every day.  Dose:  5 mg      COMBIGAN 0.2-0.5 % Soln  Generic drug:  Brimonidine Tartrate-Timolol   Place 1 Drop in both eyes 2 Times a Day.  Dose:  1 Drop     latanoprost 0.005 % Soln  Commonly known as:  XALATAN   Place 1 Drop in left eye every evening.  Dose:  1 Drop     levETIRAcetam 500 MG Tabs  Commonly known as:  KEPPRA   Take 500 mg by mouth 2 times a day.  Dose:  500 mg     metoprolol 25 MG Tabs  Commonly known as:  LOPRESSOR   Take 25 mg by mouth 2 times a day.  Dose:  25 mg            Allergies  Allergies   Allergen Reactions   • Tamoxifen Anaphylaxis     bleeding       DIET  Orders Placed This Encounter   Procedures   • Diet Order Regular     Standing Status:   Standing     Number of Occurrences:   1     Order Specific Question:   Diet:     Answer:   Regular [1]       ACTIVITY  As tolerated.  Weight bearing as tolerated    CONSULTATIONS  ortho    PROCEDURES      LABORATORY  Lab Results   Component Value Date    SODIUM 139 01/12/2019    POTASSIUM 4.2 01/12/2019    CHLORIDE 109 01/12/2019    CO2 26 01/12/2019    GLUCOSE 115 (H) 01/12/2019    BUN 27 (H) 01/12/2019    CREATININE 0.83 01/12/2019    CREATININE 0.97 04/14/2011    GLOMRATE >59 11/19/2009        Lab Results   Component Value Date    WBC 12.9 (H) 01/13/2019    WBC 7.5 11/19/2009    HEMOGLOBIN 13.8 01/13/2019    HEMATOCRIT 43.2 01/13/2019    PLATELETCT 192 01/13/2019        Total time of the discharge process exceeds 33 minutes.

## 2019-01-14 NOTE — PROGRESS NOTES
[facility] Medicine Daily Progress Note    Date of Service  1/14/2019    Chief Complaint  78 y.o. female admitted 1/11/2019 with GLF    Hospital Course    77 y/o F with PMH of HTN, DLD here with above and found to have right hip fracture.      Interval Problem Update  POD 2. Having lots of pain at surgical site today.    Consultants/Specialty  Dr. Ennis    Code Status  full    Disposition  Remain on the floor    Review of Systems  Review of Systems   Constitutional: Negative for chills, fever and weight loss.   HENT: Negative for congestion and nosebleeds.    Eyes: Negative for blurred vision and pain.   Respiratory: Negative for cough, shortness of breath and stridor.    Cardiovascular: Negative for chest pain, palpitations and orthopnea.   Gastrointestinal: Negative for diarrhea, heartburn and nausea.   Genitourinary: Negative for dysuria and frequency.   Musculoskeletal: Positive for falls and joint pain. Negative for back pain and myalgias.   Skin: Negative for itching and rash.   Neurological: Negative for dizziness and focal weakness.   Psychiatric/Behavioral: Negative for depression. The patient is not nervous/anxious.         Physical Exam  Temp:  [36.2 °C (97.2 °F)-37.1 °C (98.7 °F)] 37.1 °C (98.7 °F)  Pulse:  [62-80] 80  Resp:  [15-17] 17  BP: (104-153)/(54-73) 153/73    Physical Exam   Constitutional: She is oriented to person, place, and time. No distress.   HENT:   Head: Normocephalic and atraumatic.   Mouth/Throat: Oropharynx is clear and moist.   Eyes: Pupils are equal, round, and reactive to light. EOM are normal.   Neck: Normal range of motion. No tracheal deviation present. No thyromegaly present.   Cardiovascular: Normal rate and regular rhythm.    No murmur heard.  Pulmonary/Chest: Effort normal. No respiratory distress. She has no wheezes.   Abdominal: Soft. Bowel sounds are normal. She exhibits no distension.   Musculoskeletal: She exhibits tenderness. She exhibits no edema.   Right hip pain    Neurological: She is alert and oriented to person, place, and time. No cranial nerve deficit.   Skin: Skin is warm and dry. She is not diaphoretic. No erythema.   Psychiatric: She has a normal mood and affect. Her behavior is normal. Thought content normal.       Fluids    Intake/Output Summary (Last 24 hours) at 01/14/19 0756  Last data filed at 01/13/19 1300   Gross per 24 hour   Intake              240 ml   Output                0 ml   Net              240 ml       Laboratory  Recent Labs      01/11/19   1503  01/12/19   0357  01/13/19   1254   WBC  17.4*  10.7  12.9*   RBC  5.05  4.30  4.60   HEMOGLOBIN  15.4  13.1  13.8   HEMATOCRIT  46.5  40.2  43.2   MCV  92.1  93.5  93.9   MCH  30.5  30.5  30.0   MCHC  33.1*  32.6*  31.9*   RDW  45.9  47.1  47.8   PLATELETCT  197  180  192   MPV  11.0  11.5  11.4     Recent Labs      01/11/19   1503  01/12/19   0357   SODIUM  140  139   POTASSIUM  3.9  4.2   CHLORIDE  105  109   CO2  25  26   GLUCOSE  114*  115*   BUN  24*  27*   CREATININE  0.81  0.83   CALCIUM  9.6  9.1     Recent Labs      01/11/19   1503   APTT  28.0   INR  1.03               Imaging  DX-PELVIS-1 OR 2 VIEWS   Final Result      Satisfactory positioning new right hip hemiarthroplasty on single AP view.      CT-HEAD W/O   Final Result         1.  No acute intracranial abnormality is identified, there are nonspecific white matter changes, commonly associated with small vessel ischemic disease.  Associated mild cerebral atrophy is noted.   2.  Atherosclerosis.      DX-FEMUR-2+ RIGHT   Final Result      Displaced subcapital right femoral neck fracture.      DX-PELVIS-1 OR 2 VIEWS   Final Result      Displaced right femoral neck fracture.      DX-CHEST-PORTABLE (1 VIEW)   Final Result      Patchy opacity in the peripheral right upper lobe may represent pneumonitis. Short interval follow-up is recommended.      Mild interstitial prominence is again noted.              Assessment/Plan  Fracture of neck of right  femur (HCC)- (present on admission)   Assessment & Plan    Pain control  POD 2: hemiarthroplasty  PT/OT  Pain control     Elevated liver enzymes   Assessment & Plan    Follow cmp in am     Leukocytosis   Assessment & Plan    Likely reactive  improving  Follow cbc     Subdural hematoma (HCC)- (present on admission)   Assessment & Plan    Per chart review she has history of subdural hematoma and for that she is on seizure prophylaxis with Keppra as outpatient.  She underwent CT head today and did not show any acute abnormalities.     HTN (hypertension), benign- (present on admission)   Assessment & Plan    Continue home blood pressure medications          VTE prophylaxis: scds

## 2019-01-14 NOTE — CARE PLAN
Problem: Safety  Goal: Will remain free from injury  Bed alarm on. Call button within reach. Upper bedrails up. Bed in low position. Treaded socks on. Hourly rounding.

## 2019-01-14 NOTE — CARE PLAN
Problem: Discharge Barriers/Planning  Goal: Patient's continuum of care needs will be met  Outcome: MET Date Met: 01/14/19  Discharge orders acknowledged, Pt aware and compliant with new orders, D/C teaching completed, Pt able to verbalize needs and complaint with discharge orders. Medication script given to transporter with explanation. Report given to Korin carr [place].

## 2019-01-14 NOTE — PROGRESS NOTES
Confused. Bed alarm on. Pain on RLE medicated with tylenol. Ice pack applied. Assist with ambulation handheld. Up to the bedside commode. Voiding. BM last night. Pt removed silver mepilex dressing. Incision is approximated with no drainage. Island dressing applied.

## 2019-01-14 NOTE — PROGRESS NOTES
Received shift report from alda RN and assumed care of this pt at 0715. Pt AOx2 to self and place . Pt unable to report pain/discomfort, with repositioning pt visually showed grimmacing  - Recently medicated prn per MAR. Pt is up TWO assist, . Pt is not appropriate with call light when needing assistance. Bed alarm is ON. No MD KASIE aware. Pt is on NC with SaO2 at 2 liter.  Discussed POC for day shift,  comfort, and safety. Patient has call light and personal belongings within reach. Safety and fall precautions in place. Reviewed orders, notes, labs, and test results. Hourly rounding in place with RN rounding on odd hours and CNA on even hours.

## 2019-01-14 NOTE — PROGRESS NOTES
Discharge orders acknowledged, Pt aware and compliant with new orders, D/C teaching completed, Pt able to verbalize needs and complaint with discharge orders. Medication script given to transporter with explanation. Report given to Korin [place].

## 2019-01-14 NOTE — DISCHARGE PLANNING
Received Choice form at 6537  Agency/Facility Name: [facility]  Referral sent per Choice form @ 8577

## 2019-01-14 NOTE — DISCHARGE PLANNING
Spoke with daughter Alis, she would like patient to be transported to [facility]. [facility] informed patient's daughter of Medicare right to appeal discharge. Daughter expressed understanding. Per verbal from daughter IMM signed    Received Transport Form @ 1216  Spoke to Emerita @ [facility]    Transport is scheduled for [facility] @1700 going to [facility].

## 2019-01-14 NOTE — CARE PLAN
Problem: Communication  Goal: The ability to communicate needs accurately and effectively will improve  Outcome: PROGRESSING AS EXPECTED  Pt AOx2 to place and person, pleasantly confused redirect and re educate pt.     Problem: Safety  Goal: Will remain free from injury  Outcome: PROGRESSING AS EXPECTED  Pt remains free from injury, Floor clear from clutter and cords.  Pt A+OX2, Non-Skid yellow socks, Bed/chair alarm on. Proper signs outside pt door in place. Door remains open.  Pt uses call light appropriately. Call light with in reach of pt.  Hourly rounding in place.    Problem: Venous Thromboembolism (VTW)/Deep Vein Thrombosis (DVT) Prevention:  Goal: Patient will participate in Venous Thrombosis (VTE)/Deep Vein Thrombosis (DVT)Prevention Measures  Outcome: PROGRESSING AS EXPECTED   01/14/19 0851   Mechanical/VTE Prophylaxis   Mechanical Prophylaxis  SCDs, Sequential Compression Device   SCDs, Sequential Compression Device On   OTHER   Risk Assessment Score 3   VTE RISK High   Pharmacologic Prophylaxis Used LMWH: Enoxaparin(Lovenox)       Problem: Pain Management  Goal: Pain level will decrease to patient's comfort goal  Outcome: PROGRESSING AS EXPECTED

## 2019-01-14 NOTE — DISCHARGE PLANNING
Discussed discharge plans with patients Alis cervantes verbal choice obtained for SNF #[facility] and #2 [facility] and faxed to [facility].             Care Transition Team Assessment    Information Source  Orientation : Disoriented to Event, Disoriented to Time  Information Given By: Relative  Informant's Name: helen Snell    Readmission Evaluation  Is this a readmission?: No    Elopement Risk  Legal Hold: No  Ambulatory or Self Mobile in Wheelchair: No-Not an Elopement Risk  Disoriented: Time-At Risk for Elopement, Situation-At Risk for Elopement  Psychiatric Symptoms: None  History of Wandering: No  Elopement this Admit: No  Vocalizing Wanting to Leave: No  Displays Behaviors, Body Language Wanting to Leave: No-Not at Risk for Elopement  Elopement Risk: Not at Risk for Elopement    Interdisciplinary Discharge Planning  Lives with - Patient's Self Care Capacity: Alone and Able to Care For Self  Patient or legal guardian wants to designate a caregiver (see row info): No (states dtr is POA but does not feel need to label as caregiver)  Housing / Facility: [address] [facility]  Prior Services: Housekeeping / Homemaker Services    Discharge Preparedness  What is your plan after discharge?: Skilled nursing facility  What are your discharge supports?: Child  Prior Functional Level: Needs Assist with Activities of Daily Living  Difficulity with ADLs: Bathing, Dressing    Functional Assesment  Prior Functional Level: Needs Assist with Activities of Daily Living    Finances  Financial Barriers to Discharge: No  Prescription Coverage: Yes    Vision / Hearing Impairment  Vision Impairment : Yes  Right Eye Vision: Impaired  Left Eye Vision: Impaired  Hearing Impairment : No    Values / Beliefs / Concerns  Values / Beliefs Concerns : No  Special Hospitalization Concerns: none    Advance Directive  Advance Directive?: Living Will    Domestic Abuse  Have you ever been the victim of abuse or violence?: No  Physical Abuse or Sexual  Abuse: No  Verbal Abuse or Emotional Abuse: No    Psychological Assessment  History of Substance Abuse: None  History of Psychiatric Problems: No  Non-compliant with Treatment: No  Newly Diagnosed Illness: No    Discharge Risks or Barriers  Discharge risks or barriers?: Complex medical needs    Anticipated Discharge Information  Anticipated discharge disposition: SNF

## 2019-01-14 NOTE — PROGRESS NOTES
Dual RN Skin check completed at bedside.   Surgical incision dressing is C/D/I.   Skin other wise intact.

## 2019-01-31 ENCOUNTER — PATIENT OUTREACH (OUTPATIENT)
Dept: HEALTH INFORMATION MANAGEMENT | Facility: OTHER | Age: 79
End: 2019-01-31

## 2019-01-31 NOTE — PROGRESS NOTES
Situation    TCN contacted patient daughter to discuss her DC plan and [facility] CCM program. Patient will be discharging from SNF to [facility]. Daughter would like patient to have [facility] at DC. Daughter does not feel patient currently needs CCM program but was grateful for the program information and TCN contact information should future needs arise.    Background       79 y/o F with PMH of HTN, DLD here with a fall and hip pain and found to have right hip fracture on imaging. Surgery was consulted and did hemiarthroplasty without complication. She was seen by PT/OT and recommended SNF.Patient medical history includes: HTN, subdural hematoma, leukocytosis, elevated liver enzymes     Patient admitted to hospital 1/11/19 to 1/14/19 and discharged to SNF on 1/14/19    Assessment    Patient is making slow progress at SNF and varies in functional level from min to SBA with mobility and ADLs due to cognition. Patient is set to DC to [facility] on 2/7/19 with [facility].    Recommendation TCN provided daughter with contact information. TCN will leave CCM program brochure and business card at patient bedside next week.     Patient will not be enrolled in CCM program at this time.      Deborah White, PT DPT TCN  [phone]

## 2019-02-08 ENCOUNTER — HOSPITAL ENCOUNTER (INPATIENT)
Facility: MEDICAL CENTER | Age: 79
LOS: 5 days | DRG: 189 | End: 2019-02-13
Attending: EMERGENCY MEDICINE | Admitting: HOSPITALIST
Payer: MEDICARE

## 2019-02-08 ENCOUNTER — APPOINTMENT (OUTPATIENT)
Dept: RADIOLOGY | Facility: MEDICAL CENTER | Age: 79
DRG: 189 | End: 2019-02-08
Attending: EMERGENCY MEDICINE
Payer: MEDICARE

## 2019-02-08 DIAGNOSIS — J96.01 ACUTE RESPIRATORY FAILURE WITH HYPOXIA (HCC): ICD-10-CM

## 2019-02-08 DIAGNOSIS — R09.02 HYPOXIA: ICD-10-CM

## 2019-02-08 DIAGNOSIS — R06.02 SOB (SHORTNESS OF BREATH): ICD-10-CM

## 2019-02-08 DIAGNOSIS — J44.1 COPD EXACERBATION (HCC): ICD-10-CM

## 2019-02-08 LAB
ALBUMIN SERPL BCP-MCNC: 3.8 G/DL (ref 3.2–4.9)
ALBUMIN/GLOB SERPL: 1.4 G/DL
ALP SERPL-CCNC: 84 U/L (ref 30–99)
ALT SERPL-CCNC: 8 U/L (ref 2–50)
ANION GAP SERPL CALC-SCNC: 9 MMOL/L (ref 0–11.9)
APTT PPP: 28.7 SEC (ref 24.7–36)
AST SERPL-CCNC: 14 U/L (ref 12–45)
BASOPHILS # BLD AUTO: 0.4 % (ref 0–1.8)
BASOPHILS # BLD: 0.03 K/UL (ref 0–0.12)
BILIRUB SERPL-MCNC: 0.5 MG/DL (ref 0.1–1.5)
BNP SERPL-MCNC: 150 PG/ML (ref 0–100)
BUN SERPL-MCNC: 22 MG/DL (ref 8–22)
CALCIUM SERPL-MCNC: 9.9 MG/DL (ref 8.5–10.5)
CHLORIDE SERPL-SCNC: 104 MMOL/L (ref 96–112)
CO2 SERPL-SCNC: 30 MMOL/L (ref 20–33)
CREAT SERPL-MCNC: 0.8 MG/DL (ref 0.5–1.4)
EKG IMPRESSION: NORMAL
EOSINOPHIL # BLD AUTO: 0.18 K/UL (ref 0–0.51)
EOSINOPHIL NFR BLD: 2.3 % (ref 0–6.9)
ERYTHROCYTE [DISTWIDTH] IN BLOOD BY AUTOMATED COUNT: 47.7 FL (ref 35.9–50)
GLOBULIN SER CALC-MCNC: 2.7 G/DL (ref 1.9–3.5)
GLUCOSE SERPL-MCNC: 135 MG/DL (ref 65–99)
HCT VFR BLD AUTO: 40.6 % (ref 37–47)
HGB BLD-MCNC: 12.6 G/DL (ref 12–16)
IMM GRANULOCYTES # BLD AUTO: 0.01 K/UL (ref 0–0.11)
IMM GRANULOCYTES NFR BLD AUTO: 0.1 % (ref 0–0.9)
INR PPP: 1.11 (ref 0.87–1.13)
LACTATE BLD-SCNC: 1.4 MMOL/L (ref 0.5–2)
LYMPHOCYTES # BLD AUTO: 1.86 K/UL (ref 1–4.8)
LYMPHOCYTES NFR BLD: 23.8 % (ref 22–41)
MCH RBC QN AUTO: 29.6 PG (ref 27–33)
MCHC RBC AUTO-ENTMCNC: 31 G/DL (ref 33.6–35)
MCV RBC AUTO: 95.3 FL (ref 81.4–97.8)
MONOCYTES # BLD AUTO: 0.95 K/UL (ref 0–0.85)
MONOCYTES NFR BLD AUTO: 12.2 % (ref 0–13.4)
NEUTROPHILS # BLD AUTO: 4.78 K/UL (ref 2–7.15)
NEUTROPHILS NFR BLD: 61.2 % (ref 44–72)
NRBC # BLD AUTO: 0 K/UL
NRBC BLD-RTO: 0 /100 WBC
PLATELET # BLD AUTO: 196 K/UL (ref 164–446)
PMV BLD AUTO: 11.3 FL (ref 9–12.9)
POTASSIUM SERPL-SCNC: 3.9 MMOL/L (ref 3.6–5.5)
PROT SERPL-MCNC: 6.5 G/DL (ref 6–8.2)
PROTHROMBIN TIME: 14.4 SEC (ref 12–14.6)
RBC # BLD AUTO: 4.26 M/UL (ref 4.2–5.4)
SODIUM SERPL-SCNC: 143 MMOL/L (ref 135–145)
TROPONIN I SERPL-MCNC: 0.02 NG/ML (ref 0–0.04)
WBC # BLD AUTO: 7.8 K/UL (ref 4.8–10.8)

## 2019-02-08 PROCEDURE — 770006 HCHG ROOM/CARE - MED/SURG/GYN SEMI*

## 2019-02-08 PROCEDURE — 700117 HCHG RX CONTRAST REV CODE 255: Performed by: EMERGENCY MEDICINE

## 2019-02-08 PROCEDURE — 71275 CT ANGIOGRAPHY CHEST: CPT

## 2019-02-08 PROCEDURE — 85025 COMPLETE CBC W/AUTO DIFF WBC: CPT

## 2019-02-08 PROCEDURE — 93005 ELECTROCARDIOGRAM TRACING: CPT | Performed by: EMERGENCY MEDICINE

## 2019-02-08 PROCEDURE — 85730 THROMBOPLASTIN TIME PARTIAL: CPT

## 2019-02-08 PROCEDURE — 87040 BLOOD CULTURE FOR BACTERIA: CPT

## 2019-02-08 PROCEDURE — 99285 EMERGENCY DEPT VISIT HI MDM: CPT

## 2019-02-08 PROCEDURE — 99223 1ST HOSP IP/OBS HIGH 75: CPT | Performed by: HOSPITALIST

## 2019-02-08 PROCEDURE — 83880 ASSAY OF NATRIURETIC PEPTIDE: CPT

## 2019-02-08 PROCEDURE — 85610 PROTHROMBIN TIME: CPT

## 2019-02-08 PROCEDURE — 36415 COLL VENOUS BLD VENIPUNCTURE: CPT

## 2019-02-08 PROCEDURE — 94640 AIRWAY INHALATION TREATMENT: CPT

## 2019-02-08 PROCEDURE — 84484 ASSAY OF TROPONIN QUANT: CPT

## 2019-02-08 PROCEDURE — 83605 ASSAY OF LACTIC ACID: CPT

## 2019-02-08 PROCEDURE — 80053 COMPREHEN METABOLIC PANEL: CPT

## 2019-02-08 PROCEDURE — 700101 HCHG RX REV CODE 250: Performed by: EMERGENCY MEDICINE

## 2019-02-08 RX ORDER — IPRATROPIUM BROMIDE AND ALBUTEROL SULFATE 2.5; .5 MG/3ML; MG/3ML
3 SOLUTION RESPIRATORY (INHALATION)
Status: DISCONTINUED | OUTPATIENT
Start: 2019-02-09 | End: 2019-02-10

## 2019-02-08 RX ORDER — DOXYCYCLINE 100 MG/1
100 TABLET ORAL EVERY 12 HOURS
Status: DISCONTINUED | OUTPATIENT
Start: 2019-02-09 | End: 2019-02-13 | Stop reason: HOSPADM

## 2019-02-08 RX ORDER — IPRATROPIUM BROMIDE AND ALBUTEROL SULFATE 2.5; .5 MG/3ML; MG/3ML
3 SOLUTION RESPIRATORY (INHALATION)
Status: DISCONTINUED | OUTPATIENT
Start: 2019-02-08 | End: 2019-02-13 | Stop reason: HOSPADM

## 2019-02-08 RX ORDER — POLYETHYLENE GLYCOL 3350 17 G/17G
1 POWDER, FOR SOLUTION ORAL
Status: DISCONTINUED | OUTPATIENT
Start: 2019-02-08 | End: 2019-02-13 | Stop reason: HOSPADM

## 2019-02-08 RX ORDER — OXYCODONE HYDROCHLORIDE 5 MG/1
5 TABLET ORAL 2 TIMES DAILY PRN
COMMUNITY
End: 2020-02-15

## 2019-02-08 RX ORDER — LEVETIRACETAM 250 MG/1
250 TABLET ORAL DAILY
Status: DISCONTINUED | OUTPATIENT
Start: 2019-02-09 | End: 2019-02-13 | Stop reason: HOSPADM

## 2019-02-08 RX ORDER — ACETAMINOPHEN 325 MG/1
650 TABLET ORAL EVERY 6 HOURS PRN
Status: DISCONTINUED | OUTPATIENT
Start: 2019-02-08 | End: 2019-02-13 | Stop reason: HOSPADM

## 2019-02-08 RX ORDER — ONDANSETRON 4 MG/1
4 TABLET, ORALLY DISINTEGRATING ORAL EVERY 4 HOURS PRN
Status: DISCONTINUED | OUTPATIENT
Start: 2019-02-08 | End: 2019-02-13 | Stop reason: HOSPADM

## 2019-02-08 RX ORDER — BISACODYL 10 MG
10 SUPPOSITORY, RECTAL RECTAL
Status: DISCONTINUED | OUTPATIENT
Start: 2019-02-08 | End: 2019-02-13 | Stop reason: HOSPADM

## 2019-02-08 RX ORDER — PREDNISONE 20 MG/1
40 TABLET ORAL DAILY
Status: DISCONTINUED | OUTPATIENT
Start: 2019-02-09 | End: 2019-02-12

## 2019-02-08 RX ORDER — AMOXICILLIN 250 MG
2 CAPSULE ORAL 2 TIMES DAILY
Status: DISCONTINUED | OUTPATIENT
Start: 2019-02-09 | End: 2019-02-13 | Stop reason: HOSPADM

## 2019-02-08 RX ORDER — ONDANSETRON 2 MG/ML
4 INJECTION INTRAMUSCULAR; INTRAVENOUS EVERY 4 HOURS PRN
Status: DISCONTINUED | OUTPATIENT
Start: 2019-02-08 | End: 2019-02-13 | Stop reason: HOSPADM

## 2019-02-08 RX ORDER — SODIUM CHLORIDE 9 MG/ML
INJECTION, SOLUTION INTRAVENOUS CONTINUOUS
Status: DISCONTINUED | OUTPATIENT
Start: 2019-02-09 | End: 2019-02-09

## 2019-02-08 RX ORDER — GUAIFENESIN/DEXTROMETHORPHAN 100-10MG/5
10 SYRUP ORAL EVERY 6 HOURS PRN
Status: DISCONTINUED | OUTPATIENT
Start: 2019-02-08 | End: 2019-02-13 | Stop reason: HOSPADM

## 2019-02-08 RX ORDER — LATANOPROST 50 UG/ML
1 SOLUTION/ DROPS OPHTHALMIC NIGHTLY
Status: DISCONTINUED | OUTPATIENT
Start: 2019-02-09 | End: 2019-02-13 | Stop reason: HOSPADM

## 2019-02-08 RX ORDER — BRIMONIDINE TARTRATE AND TIMOLOL MALEATE 2; 5 MG/ML; MG/ML
1 SOLUTION OPHTHALMIC 2 TIMES DAILY
Status: DISCONTINUED | OUTPATIENT
Start: 2019-02-09 | End: 2019-02-09

## 2019-02-08 RX ORDER — AMLODIPINE BESYLATE 5 MG/1
5 TABLET ORAL DAILY
Status: DISCONTINUED | OUTPATIENT
Start: 2019-02-09 | End: 2019-02-13 | Stop reason: HOSPADM

## 2019-02-08 RX ADMIN — IPRATROPIUM BROMIDE AND ALBUTEROL SULFATE 3 ML: .5; 3 SOLUTION RESPIRATORY (INHALATION) at 23:04

## 2019-02-08 RX ADMIN — IOHEXOL 60 ML: 350 INJECTION, SOLUTION INTRAVENOUS at 22:23

## 2019-02-09 PROBLEM — Z86.79 HISTORY OF SUBDURAL HEMATOMA: Status: ACTIVE | Noted: 2018-05-06

## 2019-02-09 PROBLEM — J96.01 ACUTE RESPIRATORY FAILURE WITH HYPOXIA (HCC): Status: ACTIVE | Noted: 2019-02-09

## 2019-02-09 PROBLEM — I10 ESSENTIAL HYPERTENSION: Status: ACTIVE | Noted: 2019-02-09

## 2019-02-09 PROBLEM — Z96.649 S/P HIP HEMIARTHROPLASTY: Status: ACTIVE | Noted: 2019-01-12

## 2019-02-09 PROBLEM — J44.1 COPD EXACERBATION (HCC): Status: ACTIVE | Noted: 2019-02-09

## 2019-02-09 PROBLEM — G30.1 LATE ONSET ALZHEIMER'S DISEASE WITHOUT BEHAVIORAL DISTURBANCE (HCC): Status: ACTIVE | Noted: 2019-02-09

## 2019-02-09 PROBLEM — F02.80 LATE ONSET ALZHEIMER'S DISEASE WITHOUT BEHAVIORAL DISTURBANCE (HCC): Status: ACTIVE | Noted: 2019-02-09

## 2019-02-09 LAB
25(OH)D3 SERPL-MCNC: 38 NG/ML (ref 30–100)
PROCALCITONIN SERPL-MCNC: 0.07 NG/ML
TSH SERPL DL<=0.005 MIU/L-ACNC: 2.87 UIU/ML (ref 0.38–5.33)
VIT B12 SERPL-MCNC: 370 PG/ML (ref 211–911)

## 2019-02-09 PROCEDURE — A9270 NON-COVERED ITEM OR SERVICE: HCPCS | Performed by: HOSPITALIST

## 2019-02-09 PROCEDURE — 94640 AIRWAY INHALATION TREATMENT: CPT

## 2019-02-09 PROCEDURE — 84443 ASSAY THYROID STIM HORMONE: CPT

## 2019-02-09 PROCEDURE — 700111 HCHG RX REV CODE 636 W/ 250 OVERRIDE (IP): Performed by: HOSPITALIST

## 2019-02-09 PROCEDURE — 770006 HCHG ROOM/CARE - MED/SURG/GYN SEMI*

## 2019-02-09 PROCEDURE — 700102 HCHG RX REV CODE 250 W/ 637 OVERRIDE(OP): Performed by: HOSPITALIST

## 2019-02-09 PROCEDURE — 99233 SBSQ HOSP IP/OBS HIGH 50: CPT | Performed by: FAMILY MEDICINE

## 2019-02-09 PROCEDURE — 99407 BEHAV CHNG SMOKING > 10 MIN: CPT

## 2019-02-09 PROCEDURE — 82306 VITAMIN D 25 HYDROXY: CPT

## 2019-02-09 PROCEDURE — 36415 COLL VENOUS BLD VENIPUNCTURE: CPT

## 2019-02-09 PROCEDURE — 94760 N-INVAS EAR/PLS OXIMETRY 1: CPT

## 2019-02-09 PROCEDURE — 700101 HCHG RX REV CODE 250: Performed by: HOSPITALIST

## 2019-02-09 PROCEDURE — 84145 PROCALCITONIN (PCT): CPT

## 2019-02-09 PROCEDURE — 82607 VITAMIN B-12: CPT

## 2019-02-09 PROCEDURE — 700105 HCHG RX REV CODE 258: Performed by: HOSPITALIST

## 2019-02-09 RX ORDER — TIMOLOL MALEATE 5 MG/ML
1 SOLUTION/ DROPS OPHTHALMIC 2 TIMES DAILY
Status: DISCONTINUED | OUTPATIENT
Start: 2019-02-09 | End: 2019-02-13 | Stop reason: HOSPADM

## 2019-02-09 RX ORDER — BRIMONIDINE TARTRATE 2 MG/ML
1 SOLUTION/ DROPS OPHTHALMIC 2 TIMES DAILY
Status: DISCONTINUED | OUTPATIENT
Start: 2019-02-09 | End: 2019-02-13 | Stop reason: HOSPADM

## 2019-02-09 RX ADMIN — TIMOLOL MALEATE 1 DROP: 5 SOLUTION/ DROPS OPHTHALMIC at 18:08

## 2019-02-09 RX ADMIN — LEVETIRACETAM 250 MG: 250 TABLET ORAL at 06:15

## 2019-02-09 RX ADMIN — SODIUM CHLORIDE: 9 INJECTION, SOLUTION INTRAVENOUS at 03:19

## 2019-02-09 RX ADMIN — DOXYCYCLINE 100 MG: 100 TABLET, FILM COATED ORAL at 06:15

## 2019-02-09 RX ADMIN — METOPROLOL TARTRATE 25 MG: 25 TABLET, FILM COATED ORAL at 06:15

## 2019-02-09 RX ADMIN — IPRATROPIUM BROMIDE AND ALBUTEROL SULFATE 3 ML: .5; 3 SOLUTION RESPIRATORY (INHALATION) at 19:17

## 2019-02-09 RX ADMIN — PREDNISONE 40 MG: 20 TABLET ORAL at 11:10

## 2019-02-09 RX ADMIN — LATANOPROST 1 DROP: 50 SOLUTION OPHTHALMIC at 22:38

## 2019-02-09 RX ADMIN — BRIMONIDINE TARTRATE 1 DROP: 2 SOLUTION OPHTHALMIC at 06:08

## 2019-02-09 RX ADMIN — TIMOLOL MALEATE 1 DROP: 5 SOLUTION/ DROPS OPHTHALMIC at 06:14

## 2019-02-09 RX ADMIN — DOXYCYCLINE 100 MG: 100 TABLET, FILM COATED ORAL at 18:06

## 2019-02-09 RX ADMIN — METOPROLOL TARTRATE 25 MG: 25 TABLET, FILM COATED ORAL at 18:06

## 2019-02-09 RX ADMIN — ENOXAPARIN SODIUM 40 MG: 100 INJECTION SUBCUTANEOUS at 06:15

## 2019-02-09 RX ADMIN — IPRATROPIUM BROMIDE AND ALBUTEROL SULFATE 3 ML: .5; 3 SOLUTION RESPIRATORY (INHALATION) at 15:33

## 2019-02-09 RX ADMIN — AMLODIPINE BESYLATE 5 MG: 5 TABLET ORAL at 06:15

## 2019-02-09 RX ADMIN — BRIMONIDINE TARTRATE 1 DROP: 2 SOLUTION OPHTHALMIC at 18:08

## 2019-02-09 ASSESSMENT — ENCOUNTER SYMPTOMS
CHILLS: 0
PSYCHIATRIC NEGATIVE: 1
COUGH: 1
NEUROLOGICAL NEGATIVE: 1
WHEEZING: 1
SORE THROAT: 0
VOMITING: 0
BACK PAIN: 0
ABDOMINAL PAIN: 0
CARDIOVASCULAR NEGATIVE: 1
EYES NEGATIVE: 1
WEAKNESS: 1
DIARRHEA: 0
CONSTITUTIONAL NEGATIVE: 1
HEARTBURN: 0
FEVER: 0
BLURRED VISION: 0
GASTROINTESTINAL NEGATIVE: 1
NAUSEA: 1
NERVOUS/ANXIOUS: 0
PALPITATIONS: 0
MUSCULOSKELETAL NEGATIVE: 1
NECK PAIN: 0
DIZZINESS: 0
SHORTNESS OF BREATH: 0
HEADACHES: 0

## 2019-02-09 ASSESSMENT — COPD QUESTIONNAIRES
COPD SCREENING SCORE: 7
DURING THE PAST 4 WEEKS HOW MUCH DID YOU FEEL SHORT OF BREATH: SOME OF THE TIME
DO YOU EVER COUGH UP ANY MUCUS OR PHLEGM?: YES, A FEW DAYS A WEEK OR MONTH
HAVE YOU SMOKED AT LEAST 100 CIGARETTES IN YOUR ENTIRE LIFE: YES

## 2019-02-09 ASSESSMENT — LIFESTYLE VARIABLES: EVER_SMOKED: YES

## 2019-02-09 NOTE — ED PROVIDER NOTES
"ED Provider Note    CHIEF COMPLAINT  Chief Complaint   Patient presents with   • Shortness of Breath     started today, 82% RA per EMS.        HPI  Hilda Blake is a 78 y.o. female who presents for evaluation of hypoxia with shortness of breath, the patient is residing in a nursing home, she recently was discharged from the hospital and rehab facility after a surgery for a hip fracture, the patient states that she is short of breath but no chest pain, no unilateral lower leg pain or swelling, no fever, she denies even having a cough, abdominal pain or back pain.  She offers no other complaints at this time other than to say throughout \"my whole life\" she is been dealing with issues like this.    REVIEW OF SYSTEMS  Negative for fever, rash, chest pain, abdominal pain, nausea, vomiting, diarrhea, headache, focal weakness, focal numbness, focal tingling, back pain. All other systems are negative.     PAST MEDICAL HISTORY  Past Medical History:   Diagnosis Date   • Breast cancer (HCC) 2004    treated with radiation and resection   • Glaucoma - Dr. Lima 4/20/2010   • Hemangioma of liver    • Hemangioma of liver benign 8/19/2013   • HTN (hypertension), benign    • Hyperlipidemia    • Hypertension    • Osteopenia    • Ptosis of eyelid     right   • Tobacco abuse     chronic       FAMILY HISTORY  Family History   Problem Relation Age of Onset   • Autoimmune Disease Son         Sarcoidosis   • Cancer Son 48        colon    • No Known Problems Son    • No Known Problems Son    • No Known Problems Daughter    • Diabetes Neg Hx    • Hypertension Neg Hx    • Heart Disease Neg Hx        SOCIAL HISTORY  Social History   Substance Use Topics   • Smoking status: Former Smoker     Packs/day: 0.50     Years: 61.00     Types: Cigarettes     Start date: 4/27/1956     Quit date: 7/11/2018   • Smokeless tobacco: Never Used      Comment: cessation recommended   • Alcohol use 0.5 oz/week     1 Shots of liquor per week      Comment: > " 1 per week       SURGICAL HISTORY  Past Surgical History:   Procedure Laterality Date   • HIP HEMIARTHROPLASTY Right 2019    Procedure: HIP HEMIARTHROPLASTY;  Surgeon: Kirill Sanchez M.D.;  Location: SURGERY [facility];  Service: Orthopedics   • CHOLECYSTECTOMY     • CHOLECYSTECTOMY     • MASTECTOMY BILATERAL SUBQ     • MASTECTOMY BILATERAL SUBQ         CURRENT MEDICATIONS  I personally reviewed the medication list in the charting documentation.     ALLERGIES  Allergies   Allergen Reactions   • Tamoxifen Anaphylaxis     bleeding       MEDICAL RECORD  I have reviewed patient's medical record and pertinent results are listed above.      PHYSICAL EXAM  VITAL SIGNS: /63   Pulse 73   Temp 36.9 °C (98.4 °F) (Temporal)   Resp 20   Wt 54.4 kg (120 lb)   SpO2 95%   BMI 20.60 kg/m²    Constitutional: Elderly and frail-appearing but no acute distress   hENT: Mucus membranes moist.    Eyes: No scleral icterus. Normal conjunctiva   Neck: Supple, comfortable, nonpainful range of motion.   Cardiovascular: Regular heart rate and rhythm.   Thorax & Lungs: Scattered expiratory wheezes with a few inspiratory wheezes, bibasilar crackles are appreciated  Abdomen: Soft, with no tenderness, rebound nor guarding.  No mass or pulsatile mass appreciated.  Skin: Warm, dry. No rash appreciated  Extremities/Musculoskeletal: No sign of trauma. No asymmetric calf tenderness, erythema or edema. Normal range of motion   Neurologic: Alert & oriented. No focal deficits observed.     DIAGNOSTIC STUDIES / PROCEDURES    EKG  Results for orders placed or performed during the hospital encounter of 19   EKG   Result Value Ref Range    Report       [facility] Emergency Dept.    Test Date:  2019  Pt Name:    ABRIL REHMAN               Department: ER  MRN:        [id number]                      Room:        28  Gender:     Female                       Technician: [id number]  :        1940                    Requested By:DANIELA NORMAN  Order #:    [id number]                    Reading MD: DANIELA NORMAN MD    Measurements  Intervals                                Axis  Rate:       69                           P:          65  PA:         176                          QRS:        -12  QRSD:       102                          T:          16  QT:         400  QTc:        429    Interpretive Statements  12 Lead EKG interpreted by me to show: -- Rate 69 -- Rhythm: Normal sinus  rhythm  -- Axis: Normal -- PA and QRS Intervals: Normal -- T waves: No acute changes  --  ST segments: No acute changes -- Ectopy: None. My impression of this EKG:  Does  not indicate acute ischemia at this time.  Electronically Si gned On 2-8-2019 20:05:31 PST by DANIELA NORMAN MD         LABS  Results for orders placed or performed during the hospital encounter of 02/08/19   CBC w/ Differential   Result Value Ref Range    WBC 7.8 4.8 - 10.8 K/uL    RBC 4.26 4.20 - 5.40 M/uL    Hemoglobin 12.6 12.0 - 16.0 g/dL    Hematocrit 40.6 37.0 - 47.0 %    MCV 95.3 81.4 - 97.8 fL    MCH 29.6 27.0 - 33.0 pg    MCHC 31.0 (L) 33.6 - 35.0 g/dL    RDW 47.7 35.9 - 50.0 fL    Platelet Count 196 164 - 446 K/uL    MPV 11.3 9.0 - 12.9 fL    Neutrophils-Polys 61.20 44.00 - 72.00 %    Lymphocytes 23.80 22.00 - 41.00 %    Monocytes 12.20 0.00 - 13.40 %    Eosinophils 2.30 0.00 - 6.90 %    Basophils 0.40 0.00 - 1.80 %    Immature Granulocytes 0.10 0.00 - 0.90 %    Nucleated RBC 0.00 /100 WBC    Neutrophils (Absolute) 4.78 2.00 - 7.15 K/uL    Lymphs (Absolute) 1.86 1.00 - 4.80 K/uL    Monos (Absolute) 0.95 (H) 0.00 - 0.85 K/uL    Eos (Absolute) 0.18 0.00 - 0.51 K/uL    Baso (Absolute) 0.03 0.00 - 0.12 K/uL    Immature Granulocytes (abs) 0.01 0.00 - 0.11 K/uL    NRBC (Absolute) 0.00 K/uL   Complete Metabolic Panel (CMP)   Result Value Ref Range    Sodium 143 135 - 145 mmol/L    Potassium 3.9 3.6 - 5.5 mmol/L    Chloride 104 96 - 112 mmol/L    Co2 30 20 - 33 mmol/L     Anion Gap 9.0 0.0 - 11.9    Glucose 135 (H) 65 - 99 mg/dL    Bun 22 8 - 22 mg/dL    Creatinine 0.80 0.50 - 1.40 mg/dL    Calcium 9.9 8.5 - 10.5 mg/dL    AST(SGOT) 14 12 - 45 U/L    ALT(SGPT) 8 2 - 50 U/L    Alkaline Phosphatase 84 30 - 99 U/L    Total Bilirubin 0.5 0.1 - 1.5 mg/dL    Albumin 3.8 3.2 - 4.9 g/dL    Total Protein 6.5 6.0 - 8.2 g/dL    Globulin 2.7 1.9 - 3.5 g/dL    A-G Ratio 1.4 g/dL   Btype Natriuretic Peptide   Result Value Ref Range    B Natriuretic Peptide 150 (H) 0 - 100 pg/mL   Troponin STAT   Result Value Ref Range    Troponin I 0.02 0.00 - 0.04 ng/mL   PT/INR   Result Value Ref Range    PT 14.4 12.0 - 14.6 sec    INR 1.11 0.87 - 1.13   APTT   Result Value Ref Range    APTT 28.7 24.7 - 36.0 sec   LACTIC ACID   Result Value Ref Range    Lactic Acid 1.4 0.5 - 2.0 mmol/L   ESTIMATED GFR   Result Value Ref Range    GFR If African American >60 >60 mL/min/1.73 m 2    GFR If Non African American >60 >60 mL/min/1.73 m 2   EKG   Result Value Ref Range    Report       [facility] Emergency Dept.    Test Date:  2019  Pt Name:    ABRIL REHMAN               Department: ER  MRN:        [id number]                      Room:       [facility]  Gender:     Female                       Technician: [id number]  :        1940                   Requested By:DANIELA NORMAN  Order #:    [id number]                    Reading MD: DANIELA NORMAN MD    Measurements  Intervals                                Axis  Rate:       69                           P:          65  UT:         176                          QRS:        -12  QRSD:       102                          T:          16  QT:         400  QTc:        429    Interpretive Statements  12 Lead EKG interpreted by me to show: -- Rate 69 -- Rhythm: Normal sinus  rhythm  -- Axis: Normal -- UT and QRS Intervals: Normal -- T waves: No acute changes  --  ST segments: No acute changes -- Ectopy: None. My impression of this EKG:  Does  not indicate  acute ischemia at this time.  Electronically Si gned On 2-8-2019 20:05:31 PST by DANIELA NORMAN MD          RADIOLOGY  CT-CTA CHEST PULMONARY ARTERY W/ RECONS   Final Result      No evidence of pulmonary emboli or other acute thoracic abnormality.                  COURSE & MEDICAL DECISION MAKING  I have reviewed any medical record information, laboratory studies and radiographic results as noted above.  Differential diagnoses includes: PE, pneumonia, pneumothorax, ACS, anemia, dehydration, large fluid abnormalities    Encounter Summary: This is a 78 y.o. female with shortness of breath and hypoxia that began today, she lives in a nursing home, she is one-month status post a surgery for hip replacement and subsequent rehab treatment.  She states she has no other symptoms, on exam she is not tachycardic, on 4 L submental oxygen her oxygen saturation is within normal limits.  She was in the low 80s on room air according to EMS.  No findings concerning for DVT on exam.  Initial EKG does not reveal an obvious evidence of right heart strain or ischemia.  Will obtain a CT scan as she has a high pretest probability of pulmonary embolism, will obtain troponin, BNP as well because she is short of breath with bibasilar crackles on exam as well as coag studies anticipating the need for anticoagulation, she will be reevaluated after breathing treatment because she was wheezing as well -----blood work is essentially unremarkable, a CT scan of her chest is without abnormalities, no pneumonia, no pulmonary embolism, a room air challenge was performed and the patient desaturated to 84%, she will be admitted to the hospital for further evaluation of her hypoxia      DISPOSITION: Admit in guarded condition      FINAL IMPRESSION  1. Hypoxia    2. SOB (shortness of breath)           This dictation was created using voice recognition software. The accuracy of the dictation is limited to the abilities of the software. I expect there  may be some errors of grammar and possibly content. The nursing notes were reviewed and certain aspects of this information were incorporated into this note.    Electronically signed by: Boby Bills, 2/8/2019 8:02 PM

## 2019-02-09 NOTE — PROGRESS NOTES
Received telephone report from SHADY Chambers. Pt transferred from ED on [facility]. Pt showing no signs of acute distress at this time. Audible wheezing upon arrival. SCD's placed and IV maintinence fluid started. 2RN skin check complete. Pt has skin tear on left elbow prior to arrival with band-aid on which was removed, pictures taken, steri strips and mepilex placed.
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
## 2019-02-09 NOTE — CARE PLAN
Problem: Venous Thromboembolism (VTW)/Deep Vein Thrombosis (DVT) Prevention:  Goal: Patient will participate in Venous Thrombosis (VTE)/Deep Vein Thrombosis (DVT)Prevention Measures  Outcome: PROGRESSING AS EXPECTED  Pt has SCDs and SQ Lovenox ordered.     Problem: Respiratory:  Goal: Respiratory status will improve  Outcome: PROGRESSING AS EXPECTED  Pt on , weaned O2 down from 3.5 to 2.5 L NC. Will continue to wean as able.

## 2019-02-09 NOTE — ED NOTES
Per EMS [facility] called today due to SOB and wheezing. Pt 82% on room air. Pt had right hip replacement Jan/2019 and just got out of rehab. Pt currently refusing IV

## 2019-02-09 NOTE — PROGRESS NOTES
· 2 RN skin check complete with SHADY Magallanes.  · Devices in place NA.  · Skin assessed under devices NA.  · Confirmed pressure ulcers found on NA.  · New potential pressure ulcers noted on NA.  · The following interventions in place - soft silicone nasal canula.

## 2019-02-09 NOTE — H&P
"Hospital Medicine History & Physical Note    Date of Service  2/8/2019    Primary Care Physician  Barbara yPle P.A.-C.    Consultants  None    Code Status  Full code     Chief Complaint  Shortness of breath, hypoxic at outside facility     History of Presenting Illness  78 y.o. female with history of essential hypertension which is currently controlled, glaucoma on medication regimen, and prior tobacco abuse now with cessation, was in her usual state of health until the day of admission, when she was found by her sick with saturations in the 80s.  When speaking with the patient, she does not provide a cogent history, she does state that she has had a cough for \"since prior to her surgery\" which was according to records about 1 month ago.  She does seem to go through periods of coherent thought followed by stream of consciousness.  She does not reports shortness of breath, she does report cough, no fever or chills, no chest pain or abdominal pain, no other complaints.    Review of Systems  Review of Systems   Constitutional: Negative.    HENT: Negative.    Eyes: Negative.    Respiratory: Positive for cough. Negative for shortness of breath.    Cardiovascular: Negative.    Gastrointestinal: Negative.    Genitourinary: Negative.    Musculoskeletal: Negative.    Skin: Negative.    Neurological: Negative.    Endo/Heme/Allergies: Negative.    Psychiatric/Behavioral: Negative.        Past Medical History   has a past medical history of Breast cancer (HCC) (2004); Glaucoma - Dr. Lima (4/20/2010); Hemangioma of liver; Hemangioma of liver benign (8/19/2013); HTN (hypertension), benign; Hyperlipidemia; Hypertension; Osteopenia; Ptosis of eyelid; and Tobacco abuse.    Surgical History   has a past surgical history that includes mastectomy bilateral subq; cholecystectomy; mastectomy bilateral subq; cholecystectomy; and hip hemiarthroplasty (Right, 1/12/2019).     Family History  family history includes Autoimmune " Disease in her son; Cancer (age of onset: 48) in her son; No Known Problems in her daughter, son, and son.     Social History   reports that she quit smoking about 7 months ago. Her smoking use included Cigarettes. She started smoking about 62 years ago. She has a 30.50 pack-year smoking history. She has never used smokeless tobacco. She reports that she drinks about 0.5 oz of alcohol per week . She reports that she does not use drugs.    Allergies  Allergies   Allergen Reactions   • Tamoxifen Anaphylaxis     bleeding       Medications  Prior to Admission Medications   Prescriptions Last Dose Informant Patient Reported? Taking?   Brimonidine Tartrate-Timolol (COMBIGAN) 0.2-0.5 % Solution 2/7/2019 at PM Patient Yes No   Sig: Place 1 Drop in both eyes 2 Times a Day.   amLODIPine (NORVASC) 5 MG Tab 2/7/2019 at AM Patient Yes No   Sig: Take 5 mg by mouth every day.   latanoprost (XALATAN) 0.005 % Solution 2/7/2019 at PM Patient Yes No   Sig: Place 1 Drop in left eye every evening.   levETIRAcetam (KEPPRA) 250 MG tablet 2/7/2019 at AM Patient Yes No   Sig: Take 250 mg by mouth every day.   metoprolol (LOPRESSOR) 25 MG Tab 2/7/2019 at PM Patient Yes No   Sig: Take 25 mg by mouth 2 times a day.   oxyCODONE immediate-release (ROXICODONE) 5 MG Tab 2/7/2019 at PRN Patient Yes Yes   Sig: Take 5 mg by mouth 2 times a day as needed for Severe Pain.      Facility-Administered Medications: None       Physical Exam  Temp:  [36.9 °C (98.4 °F)] 36.9 °C (98.4 °F)  Pulse:  [60-73] 60  Resp:  [18-20] 18  BP: (120)/(63) 120/63  SpO2:  [95 %-98 %] 96 %    Physical Exam   Constitutional: She is oriented to person, place, and time. She appears well-developed and well-nourished. No distress.   HENT:   Head: Normocephalic and atraumatic.   Eyes: Pupils are equal, round, and reactive to light. Conjunctivae are normal.   Neck: Normal range of motion. Neck supple. No tracheal deviation present. No thyromegaly present.   Cardiovascular: Normal  rate, regular rhythm and normal heart sounds.  Exam reveals no gallop and no friction rub.    No murmur heard.  Pulmonary/Chest: Effort normal. No respiratory distress. She has wheezes. She has no rales.   Abdominal: Soft. Bowel sounds are normal. She exhibits no distension. There is no tenderness. There is no rebound.   Musculoskeletal: Normal range of motion. She exhibits no edema.   Lymphadenopathy:     She has no cervical adenopathy.   Neurological: She is alert and oriented to person, place, and time. No cranial nerve deficit.   Skin: Skin is warm and dry. She is not diaphoretic.   Psychiatric: She has a normal mood and affect.   Nursing note and vitals reviewed.      Laboratory:  Recent Labs      02/08/19 2110   WBC  7.8   RBC  4.26   HEMOGLOBIN  12.6   HEMATOCRIT  40.6   MCV  95.3   MCH  29.6   MCHC  31.0*   RDW  47.7   PLATELETCT  196   MPV  11.3     Recent Labs      02/08/19 2110   SODIUM  143   POTASSIUM  3.9   CHLORIDE  104   CO2  30   GLUCOSE  135*   BUN  22   CREATININE  0.80   CALCIUM  9.9     Recent Labs      02/08/19 2110   ALTSGPT  8   ASTSGOT  14   ALKPHOSPHAT  84   TBILIRUBIN  0.5   GLUCOSE  135*     Recent Labs      02/08/19 2110   APTT  28.7   INR  1.11     Recent Labs      02/08/19 2110   BNPBTYPENAT  150*         Recent Labs      02/08/19 2110   TROPONINI  0.02       Urinalysis:    No results found     Imaging:  CT-CTA CHEST PULMONARY ARTERY W/ RECONS   Final Result      No evidence of pulmonary emboli or other acute thoracic abnormality.                  Assessment/Plan:  I anticipate this patient will require at least two midnights for appropriate medical management, necessitating inpatient admission.    * Acute respiratory failure with hypoxia (HCC)   Assessment & Plan    Unclear etiology - no evident changes on CT imaging, no evidence of PE.  Suspect acute likely on chronic bronchitis given history of smoking.  Will continue oxygen supplementation, add doxycycline, steroid  therapy and respiratory therapy.  Monitor.      Fracture of neck of right femur (HCC)- (present on admission)   Assessment & Plan    January 2019 status post surgery, currently rehabilitating at skilled facility.  Stable.      Essential hypertension   Assessment & Plan    Controlled with current medication regimen.  Monitor.      Late onset Alzheimer's disease without behavioral disturbance   Assessment & Plan    Unclear if previously diagnosed, however with significant memory deficit.  Will monitor.      Subdural hematoma (HCC)- (present on admission)   Assessment & Plan    Prior.  No residua on current CT head.  Of note, patient's chart is labeled with seizure disorder on keppra, however prior records state that the keppra was given as prophylaxis for one month after SDH.  Will refrain from discontinuing at this point, but may need further evaluation by neurology as outpatient to make this decision.      Glaucoma - Dr. Lima- (present on admission)   Assessment & Plan    Continue current medication regimen          VTE prophylaxis: SCD, lovenox

## 2019-02-09 NOTE — ED NOTES
Med Rec Updated and Complete per Pt at bedside  Allergies Reviewed  No PO ABX last 30 days    Pt reports she is not taking Lovenox injections at home.  Pt reports she is not taking Anticoagulants or Bloodthinners.

## 2019-02-09 NOTE — RESPIRATORY CARE
COPD EDUCATION by COPD CLINICAL EDUCATOR  2/9/2019  at  12:48 PM by Joyce Noguera     Patient interviewed by COPD education team.  Patient refused to participate in full program.  Short intervention has been conducted and continued smoking cessation discussed.

## 2019-02-09 NOTE — PROGRESS NOTES
[facility] Medicine Daily Progress Note    Date of Service  2/9/2019    Chief Complaint  78 y.o. female admitted 2/8/2019 with respiratory failure.    Hospital Course  Admitted with respiratory failure, appears to have probable COPD exacerbation.  Recent admission for femoral neck fracture, underwent right hip hemiarthroplasty, discharged to skilled nursing facility.    Interval Problem Update  Resp failure - O2 at 3 lpm NC  COPD exac - mild wheezing  HTN - controlled    Consultants/Specialty  None    Code Status  Full    Disposition  SNF    Review of Systems  Review of Systems   Constitutional: Positive for malaise/fatigue. Negative for chills and fever.   HENT: Negative for hearing loss and sore throat.    Eyes: Negative for blurred vision.   Respiratory: Positive for cough and wheezing. Negative for shortness of breath.    Cardiovascular: Negative for chest pain, palpitations and leg swelling.   Gastrointestinal: Positive for nausea. Negative for abdominal pain, diarrhea, heartburn and vomiting.   Genitourinary: Negative for dysuria.   Musculoskeletal: Positive for joint pain. Negative for back pain and neck pain.   Skin: Negative for rash.   Neurological: Positive for weakness. Negative for dizziness and headaches.   Psychiatric/Behavioral: The patient is not nervous/anxious.         Physical Exam  Temp:  [36.4 °C (97.5 °F)-36.9 °C (98.4 °F)] 36.5 °C (97.7 °F)  Pulse:  [58-73] 58  Resp:  [16-20] 17  BP: (103-139)/(55-74) 103/55  SpO2:  [90 %-98 %] 98 %    Physical Exam   Constitutional: She appears well-developed.   HENT:   Head: Normocephalic and atraumatic.   Eyes: Pupils are equal, round, and reactive to light. Conjunctivae are normal.   Neck: No tracheal deviation present. No thyromegaly present.   Cardiovascular: Normal rate and regular rhythm.    Pulmonary/Chest: Effort normal. She has wheezes.   Abdominal: Soft. Bowel sounds are normal. She exhibits no distension. There is no tenderness.   Musculoskeletal:  She exhibits no edema.   Lymphadenopathy:     She has no cervical adenopathy.   Neurological: She is alert.   Oriented to person and place   Skin: Skin is warm and dry.   Nursing note and vitals reviewed.      Fluids  No intake or output data in the 24 hours ending 02/09/19 1051    Laboratory  Recent Labs      02/08/19 2110   WBC  7.8   RBC  4.26   HEMOGLOBIN  12.6   HEMATOCRIT  40.6   MCV  95.3   MCH  29.6   MCHC  31.0*   RDW  47.7   PLATELETCT  196   MPV  11.3     Recent Labs      02/08/19 2110   SODIUM  143   POTASSIUM  3.9   CHLORIDE  104   CO2  30   GLUCOSE  135*   BUN  22   CREATININE  0.80   CALCIUM  9.9     Recent Labs      02/08/19 2110   APTT  28.7   INR  1.11     Recent Labs      02/08/19 2110   BNPBTYPENAT  150*           Imaging  CT-CTA CHEST PULMONARY ARTERY W/ RECONS   Final Result      No evidence of pulmonary emboli or other acute thoracic abnormality.            EC-ECHOCARDIOGRAM COMPLETE W/O CONT    (Results Pending)        Assessment/Plan  * Acute respiratory failure with hypoxia (HCC)- (present on admission)   Assessment & Plan    Suspect possible chronic component  Keep O2 sats above 92%  Encourage I-S, RT protocol  Check echocardiogram     COPD exacerbation (HCC)- (present on admission)   Assessment & Plan    Prednisone, doxycycline, RT protocol     S/P hip hemiarthroplasty- (present on admission)   Assessment & Plan    1/12/2019   Check vitamin D level  PT/OT     Essential hypertension- (present on admission)   Assessment & Plan    Metoprolol and Norvasc     History of subdural hematoma- (present on admission)   Assessment & Plan    Keppra for seizure prophylaxis     Glaucoma - (present on admission)   Assessment & Plan    Timolol, brimonidine, Xalatan          VTE prophylaxis: Lovenox

## 2019-02-09 NOTE — CARE PLAN
Problem: Safety  Goal: Will remain free from falls  Outcome: PROGRESSING AS EXPECTED  Pt standby assist with shuffling gait.     Problem: Infection  Goal: Will remain free from infection  Outcome: PROGRESSING AS EXPECTED  No s/sx of infection at this time.

## 2019-02-10 ENCOUNTER — APPOINTMENT (OUTPATIENT)
Dept: CARDIOLOGY | Facility: MEDICAL CENTER | Age: 79
DRG: 189 | End: 2019-02-10
Attending: FAMILY MEDICINE
Payer: MEDICARE

## 2019-02-10 PROBLEM — R73.9 HYPERGLYCEMIA: Status: ACTIVE | Noted: 2019-02-10

## 2019-02-10 PROBLEM — R79.89 LOW VITAMIN B12 LEVEL: Status: ACTIVE | Noted: 2019-02-10

## 2019-02-10 LAB
ANION GAP SERPL CALC-SCNC: 8 MMOL/L (ref 0–11.9)
BASOPHILS # BLD AUTO: 0.3 % (ref 0–1.8)
BASOPHILS # BLD: 0.02 K/UL (ref 0–0.12)
BUN SERPL-MCNC: 20 MG/DL (ref 8–22)
CALCIUM SERPL-MCNC: 8.5 MG/DL (ref 8.5–10.5)
CHLORIDE SERPL-SCNC: 107 MMOL/L (ref 96–112)
CO2 SERPL-SCNC: 27 MMOL/L (ref 20–33)
CREAT SERPL-MCNC: 0.72 MG/DL (ref 0.5–1.4)
EOSINOPHIL # BLD AUTO: 0 K/UL (ref 0–0.51)
EOSINOPHIL NFR BLD: 0 % (ref 0–6.9)
ERYTHROCYTE [DISTWIDTH] IN BLOOD BY AUTOMATED COUNT: 46.1 FL (ref 35.9–50)
GLUCOSE SERPL-MCNC: 193 MG/DL (ref 65–99)
HCT VFR BLD AUTO: 39.2 % (ref 37–47)
HGB BLD-MCNC: 12.1 G/DL (ref 12–16)
IMM GRANULOCYTES # BLD AUTO: 0.01 K/UL (ref 0–0.11)
IMM GRANULOCYTES NFR BLD AUTO: 0.2 % (ref 0–0.9)
LYMPHOCYTES # BLD AUTO: 1.05 K/UL (ref 1–4.8)
LYMPHOCYTES NFR BLD: 17.3 % (ref 22–41)
MCH RBC QN AUTO: 29.5 PG (ref 27–33)
MCHC RBC AUTO-ENTMCNC: 30.9 G/DL (ref 33.6–35)
MCV RBC AUTO: 95.6 FL (ref 81.4–97.8)
MONOCYTES # BLD AUTO: 0.4 K/UL (ref 0–0.85)
MONOCYTES NFR BLD AUTO: 6.6 % (ref 0–13.4)
NEUTROPHILS # BLD AUTO: 4.58 K/UL (ref 2–7.15)
NEUTROPHILS NFR BLD: 75.6 % (ref 44–72)
NRBC # BLD AUTO: 0 K/UL
NRBC BLD-RTO: 0 /100 WBC
PLATELET # BLD AUTO: 185 K/UL (ref 164–446)
PMV BLD AUTO: 11 FL (ref 9–12.9)
POTASSIUM SERPL-SCNC: 3.9 MMOL/L (ref 3.6–5.5)
RBC # BLD AUTO: 4.1 M/UL (ref 4.2–5.4)
SODIUM SERPL-SCNC: 142 MMOL/L (ref 135–145)
WBC # BLD AUTO: 6.1 K/UL (ref 4.8–10.8)

## 2019-02-10 PROCEDURE — 80048 BASIC METABOLIC PNL TOTAL CA: CPT

## 2019-02-10 PROCEDURE — 770006 HCHG ROOM/CARE - MED/SURG/GYN SEMI*

## 2019-02-10 PROCEDURE — 700111 HCHG RX REV CODE 636 W/ 250 OVERRIDE (IP): Performed by: HOSPITALIST

## 2019-02-10 PROCEDURE — 36415 COLL VENOUS BLD VENIPUNCTURE: CPT

## 2019-02-10 PROCEDURE — A9270 NON-COVERED ITEM OR SERVICE: HCPCS | Performed by: HOSPITALIST

## 2019-02-10 PROCEDURE — 85025 COMPLETE CBC W/AUTO DIFF WBC: CPT

## 2019-02-10 PROCEDURE — 94640 AIRWAY INHALATION TREATMENT: CPT

## 2019-02-10 PROCEDURE — 700101 HCHG RX REV CODE 250: Performed by: HOSPITALIST

## 2019-02-10 PROCEDURE — 99233 SBSQ HOSP IP/OBS HIGH 50: CPT | Performed by: FAMILY MEDICINE

## 2019-02-10 PROCEDURE — 97165 OT EVAL LOW COMPLEX 30 MIN: CPT

## 2019-02-10 PROCEDURE — 700102 HCHG RX REV CODE 250 W/ 637 OVERRIDE(OP): Performed by: HOSPITALIST

## 2019-02-10 PROCEDURE — 94760 N-INVAS EAR/PLS OXIMETRY 1: CPT

## 2019-02-10 RX ORDER — IPRATROPIUM BROMIDE AND ALBUTEROL SULFATE 2.5; .5 MG/3ML; MG/3ML
3 SOLUTION RESPIRATORY (INHALATION)
Status: DISCONTINUED | OUTPATIENT
Start: 2019-02-10 | End: 2019-02-11

## 2019-02-10 RX ADMIN — BRIMONIDINE TARTRATE 1 DROP: 2 SOLUTION OPHTHALMIC at 05:46

## 2019-02-10 RX ADMIN — IPRATROPIUM BROMIDE AND ALBUTEROL SULFATE 3 ML: .5; 3 SOLUTION RESPIRATORY (INHALATION) at 15:41

## 2019-02-10 RX ADMIN — IPRATROPIUM BROMIDE AND ALBUTEROL SULFATE 3 ML: .5; 3 SOLUTION RESPIRATORY (INHALATION) at 08:44

## 2019-02-10 RX ADMIN — TIMOLOL MALEATE 1 DROP: 5 SOLUTION/ DROPS OPHTHALMIC at 05:46

## 2019-02-10 RX ADMIN — BRIMONIDINE TARTRATE 1 DROP: 2 SOLUTION OPHTHALMIC at 17:40

## 2019-02-10 RX ADMIN — METOPROLOL TARTRATE 25 MG: 25 TABLET, FILM COATED ORAL at 17:40

## 2019-02-10 RX ADMIN — LEVETIRACETAM 250 MG: 250 TABLET ORAL at 05:45

## 2019-02-10 RX ADMIN — AMLODIPINE BESYLATE 5 MG: 5 TABLET ORAL at 05:43

## 2019-02-10 RX ADMIN — IPRATROPIUM BROMIDE AND ALBUTEROL SULFATE 3 ML: .5; 3 SOLUTION RESPIRATORY (INHALATION) at 20:25

## 2019-02-10 RX ADMIN — TIMOLOL MALEATE 1 DROP: 5 SOLUTION/ DROPS OPHTHALMIC at 17:40

## 2019-02-10 RX ADMIN — ENOXAPARIN SODIUM 40 MG: 100 INJECTION SUBCUTANEOUS at 05:46

## 2019-02-10 RX ADMIN — DOXYCYCLINE 100 MG: 100 TABLET, FILM COATED ORAL at 05:45

## 2019-02-10 RX ADMIN — LATANOPROST 1 DROP: 50 SOLUTION OPHTHALMIC at 20:33

## 2019-02-10 RX ADMIN — DOXYCYCLINE 100 MG: 100 TABLET, FILM COATED ORAL at 17:39

## 2019-02-10 RX ADMIN — Medication 2 TABLET: at 17:40

## 2019-02-10 RX ADMIN — METOPROLOL TARTRATE 25 MG: 25 TABLET, FILM COATED ORAL at 05:45

## 2019-02-10 RX ADMIN — Medication 2 TABLET: at 05:44

## 2019-02-10 RX ADMIN — PREDNISONE 40 MG: 20 TABLET ORAL at 05:44

## 2019-02-10 ASSESSMENT — ENCOUNTER SYMPTOMS
WEAKNESS: 1
NERVOUS/ANXIOUS: 0
FEVER: 0
DIZZINESS: 0
SORE THROAT: 0
CHILLS: 0
NECK PAIN: 0
VOMITING: 0
COUGH: 1
ABDOMINAL PAIN: 0
WHEEZING: 1
HEADACHES: 0
PALPITATIONS: 0
BLURRED VISION: 0
HEARTBURN: 0
NAUSEA: 0
DIARRHEA: 0
SHORTNESS OF BREATH: 0
BACK PAIN: 0

## 2019-02-10 ASSESSMENT — COGNITIVE AND FUNCTIONAL STATUS - GENERAL
TOILETING: A LOT
DAILY ACTIVITIY SCORE: 16
PERSONAL GROOMING: A LITTLE
DRESSING REGULAR UPPER BODY CLOTHING: A LITTLE
SUGGESTED CMS G CODE MODIFIER DAILY ACTIVITY: CK
DRESSING REGULAR LOWER BODY CLOTHING: A LOT
HELP NEEDED FOR BATHING: A LOT

## 2019-02-10 ASSESSMENT — ACTIVITIES OF DAILY LIVING (ADL): TOILETING: REQUIRES ASSIST

## 2019-02-10 NOTE — PROGRESS NOTES
Received bedside report and assumed pt care at 1900. Pt awake with no signs of acute distress. VSS. A&Ox4. Assessment complete and labs reviewed. Updated whiteboard and discussed POC with pt. Fall prevention in place. Bed alarm on. Call light within reach. No further needs at this time. Will continue hourly rounding.

## 2019-02-10 NOTE — THERAPY
"Occupational Therapy Evaluation completed.   Functional Status:  Pt is a 77 y/o female admitted for COPD exacerbation. Pt was a [facility] for rehab for R hip hemiarthroplasty, WBAT. Pt was just discharged to [facility] for 1 day before being admitted to hospital. RN reported hx of Alzheimer dementia, but no history to be found to substantiate claim. Pt was on 0.5L O2 in room and did slightly desat with activity (94 ->88%). Pt ambulated 100' very slowly, denied needing to use bathroom. Pt would likely be able to discharge back to [facility] assuming facility can take patient. Pt appears to be at baseline. No further acute OT needs upon d/c, continue to improve endurance and mobility during ADL care.    Plan of Care: Will benefit from Occupational Therapy 2 times per week  Discharge Recommendations:  Equipment: Will Continue to Assess for Equipment Needs.     Anticipate that the patient will have no further occupational therapy needs after discharge from the hospital.     See \"Rehab Therapy-Acute\" Patient Summary Report for complete documentation.    "

## 2019-02-10 NOTE — CARE PLAN
Problem: Safety  Goal: Will remain free from falls  Outcome: PROGRESSING AS EXPECTED  Pt calls appropriately    Problem: Infection  Goal: Will remain free from infection  Outcome: PROGRESSING AS EXPECTED  No s/sx of infection

## 2019-02-10 NOTE — DIETARY
"Nutrition services: Day 2 of admit.  Hilda Blake is a 78 y.o. female with admitting DX of Acute respiratory failure. Further Dx: COPD, s/p hip hemiarthroplasty, Low vit B-12, hyperglycemia, essential HTN, hx of Subdural hematoma, Glaucoma.     Pt seen for low BMI. Attempted to speak with pt, pt poor historian and unable to confirm UBW, PO intake PTA, etc. Confirmed with RN, pt with memory deficits. Pt declined snacks/supplements and reports good appetite and eager for breakfast meal this am.     Assessment:  Height: 162.6 cm (5' 4\") (stated per pt)  Weight: (!) 23.5 kg (51 lb 12.8 oz)  Body mass index is 8.89 kg/m².  Diet/Intake: Regular. Per ADL, pt ate % of breakfast this am.     Evaluation:   1. Pertinent Labs: am Glu 193. Pt on prednisone. No hx of DM.     Malnutrition Risk: Does not meet enough criteria at this point as little history available to properly assess pt.     Recommendations/Plan:  1. RD to monitor for consistently adequate intake.   2. Encourage intake and document all PO as % taken in ADL's to provide interdisciplinary communication across all shifts.   3. Monitor weight. Discussed with RN, believe wt should have been entered as 51 kg instead of lbs. RN to get new wt.   4. Nutrition rep will continue to see patient for ongoing meal and snack preferences.                     "

## 2019-02-10 NOTE — CARE PLAN
Problem: Safety  Goal: Will remain free from injury    Intervention: Provide assistance with mobility  Instructing patient to call for assistance when ambulating. Progressing, called out to get to bathroom. Using walker appropriately WCTM

## 2019-02-10 NOTE — PROGRESS NOTES
[facility] Medicine Daily Progress Note    Date of Service  2/10/2019    Chief Complaint  78 y.o. female admitted 2/8/2019 with respiratory failure.    Hospital Course  Admitted with respiratory failure, appears to have probable COPD exacerbation.  Recent admission for femoral neck fracture, underwent right hip hemiarthroplasty, discharged to skilled nursing facility.    Interval Problem Update  Resp failure - O2 at 2 lpm NC  COPD exac - less wheezing  HTN - controlled  Elevated BS - 193  Low B12    Consultants/Specialty  None    Code Status  Full    Disposition  SNF    Review of Systems  Review of Systems   Constitutional: Positive for malaise/fatigue. Negative for chills and fever.   HENT: Negative for hearing loss and sore throat.    Eyes: Negative for blurred vision.   Respiratory: Positive for cough and wheezing. Negative for shortness of breath.    Cardiovascular: Negative for chest pain, palpitations and leg swelling.   Gastrointestinal: Negative for abdominal pain, diarrhea, heartburn, nausea and vomiting.   Genitourinary: Negative for dysuria.   Musculoskeletal: Positive for joint pain. Negative for back pain and neck pain.   Skin: Negative for rash.   Neurological: Positive for weakness. Negative for dizziness and headaches.   Psychiatric/Behavioral: The patient is not nervous/anxious.         Physical Exam  Temp:  [36.1 °C (97 °F)-36.5 °C (97.7 °F)] 36.4 °C (97.5 °F)  Pulse:  [60-67] 60  Resp:  [16-18] 18  BP: (107-150)/(62-79) 122/73  SpO2:  [94 %-99 %] 96 %    Physical Exam   Constitutional: She appears well-developed.   HENT:   Head: Normocephalic and atraumatic.   Eyes: Pupils are equal, round, and reactive to light. Conjunctivae are normal.   Drooping right upper eyelid   Neck: No tracheal deviation present. No thyromegaly present.   Cardiovascular: Normal rate and regular rhythm.    Pulmonary/Chest: Effort normal. She has wheezes.   Abdominal: Soft. Bowel sounds are normal. She exhibits no distension.  There is no tenderness.   Musculoskeletal: She exhibits no edema.   Lymphadenopathy:     She has no cervical adenopathy.   Neurological: She is alert.   Oriented to person and place   Skin: Skin is warm and dry.   Nursing note and vitals reviewed.      Fluids    Intake/Output Summary (Last 24 hours) at 02/10/19 1201  Last data filed at 02/10/19 1000   Gross per 24 hour   Intake              360 ml   Output                0 ml   Net              360 ml       Laboratory  Recent Labs      02/08/19   2110  02/10/19   0050   WBC  7.8  6.1   RBC  4.26  4.10*   HEMOGLOBIN  12.6  12.1   HEMATOCRIT  40.6  39.2   MCV  95.3  95.6   MCH  29.6  29.5   MCHC  31.0*  30.9*   RDW  47.7  46.1   PLATELETCT  196  185   MPV  11.3  11.0     Recent Labs      02/08/19   2110  02/10/19   0050   SODIUM  143  142   POTASSIUM  3.9  3.9   CHLORIDE  104  107   CO2  30  27   GLUCOSE  135*  193*   BUN  22  20   CREATININE  0.80  0.72   CALCIUM  9.9  8.5     Recent Labs      02/08/19 2110   APTT  28.7   INR  1.11     Recent Labs      02/08/19 2110   BNPBTYPENAT  150*           Imaging  CT-CTA CHEST PULMONARY ARTERY W/ RECONS   Final Result      No evidence of pulmonary emboli or other acute thoracic abnormality.            EC-ECHOCARDIOGRAM COMPLETE W/O CONT    (Results Pending)        Assessment/Plan  * Acute respiratory failure with hypoxia (HCC)- (present on admission)   Assessment & Plan    Suspect possible chronic component  Keep O2 sats above 92%  Encourage I-S, RT protocol  Echocardiogram  pending     COPD exacerbation (HCC)- (present on admission)   Assessment & Plan    Prednisone, Doxycycline, RT protocol     S/P hip hemiarthroplasty- (present on admission)   Assessment & Plan    1/12/2019   PT/OT     Hyperglycemia- (present on admission)   Assessment & Plan    Check hemoglobin A1c     Low vitamin B12 level- (present on admission)   Assessment & Plan    Check homocystine level     Essential hypertension- (present on admission)    Assessment & Plan    Metoprolol and Norvasc     History of subdural hematoma- (present on admission)   Assessment & Plan    Keppra for seizure prophylaxis     Glaucoma - (present on admission)   Assessment & Plan    Timolol, Brimonidine, Xalatan     Ptosis of eyelid, right- (present on admission)   Assessment & Plan    chronic          VTE prophylaxis: Lovenox

## 2019-02-11 ENCOUNTER — APPOINTMENT (OUTPATIENT)
Dept: CARDIOLOGY | Facility: MEDICAL CENTER | Age: 79
DRG: 189 | End: 2019-02-11
Attending: FAMILY MEDICINE
Payer: MEDICARE

## 2019-02-11 PROBLEM — Z91.199 NONCOMPLIANCE: Status: ACTIVE | Noted: 2019-02-11

## 2019-02-11 LAB
LV EJECT FRACT  99904: 70
LV EJECT FRACT MOD 2C 99903: 79.77
LV EJECT FRACT MOD 4C 99902: 88.04
LV EJECT FRACT MOD BP 99901: 85.19

## 2019-02-11 PROCEDURE — 770006 HCHG ROOM/CARE - MED/SURG/GYN SEMI*

## 2019-02-11 PROCEDURE — 700111 HCHG RX REV CODE 636 W/ 250 OVERRIDE (IP): Performed by: HOSPITALIST

## 2019-02-11 PROCEDURE — 93306 TTE W/DOPPLER COMPLETE: CPT

## 2019-02-11 PROCEDURE — 700102 HCHG RX REV CODE 250 W/ 637 OVERRIDE(OP): Performed by: HOSPITALIST

## 2019-02-11 PROCEDURE — 93306 TTE W/DOPPLER COMPLETE: CPT | Mod: 26 | Performed by: INTERNAL MEDICINE

## 2019-02-11 PROCEDURE — 700101 HCHG RX REV CODE 250: Performed by: HOSPITALIST

## 2019-02-11 PROCEDURE — 94640 AIRWAY INHALATION TREATMENT: CPT

## 2019-02-11 PROCEDURE — 97162 PT EVAL MOD COMPLEX 30 MIN: CPT

## 2019-02-11 PROCEDURE — 99233 SBSQ HOSP IP/OBS HIGH 50: CPT | Performed by: FAMILY MEDICINE

## 2019-02-11 PROCEDURE — A9270 NON-COVERED ITEM OR SERVICE: HCPCS | Performed by: HOSPITALIST

## 2019-02-11 RX ADMIN — METOPROLOL TARTRATE 25 MG: 25 TABLET, FILM COATED ORAL at 05:33

## 2019-02-11 RX ADMIN — TIMOLOL MALEATE 1 DROP: 5 SOLUTION/ DROPS OPHTHALMIC at 18:42

## 2019-02-11 RX ADMIN — Medication 2 TABLET: at 18:42

## 2019-02-11 RX ADMIN — PREDNISONE 40 MG: 20 TABLET ORAL at 05:33

## 2019-02-11 RX ADMIN — BRIMONIDINE TARTRATE 1 DROP: 2 SOLUTION OPHTHALMIC at 05:36

## 2019-02-11 RX ADMIN — LATANOPROST 1 DROP: 50 SOLUTION OPHTHALMIC at 20:58

## 2019-02-11 RX ADMIN — LEVETIRACETAM 250 MG: 250 TABLET ORAL at 05:33

## 2019-02-11 RX ADMIN — DOXYCYCLINE 100 MG: 100 TABLET, FILM COATED ORAL at 18:42

## 2019-02-11 RX ADMIN — DOXYCYCLINE 100 MG: 100 TABLET, FILM COATED ORAL at 05:33

## 2019-02-11 RX ADMIN — ENOXAPARIN SODIUM 40 MG: 100 INJECTION SUBCUTANEOUS at 05:33

## 2019-02-11 RX ADMIN — IPRATROPIUM BROMIDE AND ALBUTEROL SULFATE 3 ML: .5; 3 SOLUTION RESPIRATORY (INHALATION) at 15:52

## 2019-02-11 RX ADMIN — BRIMONIDINE TARTRATE 1 DROP: 2 SOLUTION OPHTHALMIC at 18:42

## 2019-02-11 RX ADMIN — AMLODIPINE BESYLATE 5 MG: 5 TABLET ORAL at 05:33

## 2019-02-11 RX ADMIN — TIMOLOL MALEATE 1 DROP: 5 SOLUTION/ DROPS OPHTHALMIC at 05:34

## 2019-02-11 RX ADMIN — METOPROLOL TARTRATE 25 MG: 25 TABLET, FILM COATED ORAL at 18:42

## 2019-02-11 ASSESSMENT — ENCOUNTER SYMPTOMS
SHORTNESS OF BREATH: 0
COUGH: 1
FEVER: 0
SORE THROAT: 0
PALPITATIONS: 0
ABDOMINAL PAIN: 0
WEAKNESS: 1
WHEEZING: 1
DIARRHEA: 0
NAUSEA: 0
VOMITING: 0
NECK PAIN: 0
HEADACHES: 0
BLURRED VISION: 0
DIZZINESS: 0
BACK PAIN: 0
NERVOUS/ANXIOUS: 0
CHILLS: 0
HEARTBURN: 0

## 2019-02-11 ASSESSMENT — GAIT ASSESSMENTS
GAIT LEVEL OF ASSIST: CONTACT GUARD ASSIST
DISTANCE (FEET): 80
ASSISTIVE DEVICE: FRONT WHEEL WALKER
DEVIATION: DECREASED BASE OF SUPPORT;OTHER (COMMENT)

## 2019-02-11 ASSESSMENT — COGNITIVE AND FUNCTIONAL STATUS - GENERAL
TURNING FROM BACK TO SIDE WHILE IN FLAT BAD: A LITTLE
MOVING TO AND FROM BED TO CHAIR: A LITTLE
WALKING IN HOSPITAL ROOM: A LITTLE
SUGGESTED CMS G CODE MODIFIER MOBILITY: CK
STANDING UP FROM CHAIR USING ARMS: A LITTLE
CLIMB 3 TO 5 STEPS WITH RAILING: A LITTLE
MOBILITY SCORE: 18
MOVING FROM LYING ON BACK TO SITTING ON SIDE OF FLAT BED: A LITTLE

## 2019-02-11 NOTE — DISCHARGE PLANNING
Received Choice form at 1330.  Agency/Facility Name: [facility]   Referral sent per Choice form at 4004.

## 2019-02-11 NOTE — PROGRESS NOTES
"Received alert and oriented x 1. Pleasantly confused. Check vitals sign and recorded accordingly and due med given per MAR. Monitor sign and symptoms of respiratory distress and treatment given accordingly per MAR on 02 at 1l/nc at 95%.Medicated per MAR and reassessed every 2 hours per protocol. Denied pain and discomfort. Call light within reach. Bed alarm in placed. One person assist with FWW. Left elbow skin tear dressing reinforced. Needs attended. Will continue to monitor./70   Pulse 64   Temp 36.5 °C (97.7 °F) (Temporal)   Resp 18   Ht 1.626 m (5' 4\") Comment: stated per pt  Wt 52.1 kg (114 lb 13.8 oz)   SpO2 95%   BMI 19.72 kg/m² .  "

## 2019-02-11 NOTE — FACE TO FACE
Face to Face Supporting Documentation - Home Health    The encounter with this patient was in whole or in part the primary reason for home health admission.    Date of encounter:   Patient:                    MRN:                       YOB: 2019  Hilda Blake  [id number]  1940     Home health to see patient for:  Skilled Nursing care for assessment, interventions & education    Skilled need for:  Exacerbation of Chronic Disease State COPD exacerbation    Skilled nursing interventions to include:  Comment: PT/OT    Homebound status evidenced by:  Needs the assistance of another person in order to leave the home. Leaving home requires a considerable and taxing effort. There is a normal inability to leave the home.    Community Physician to provide follow up care: Barbara Pyle P.A.-C.     Optional Interventions? No      I certify the face to face encounter for this home health care referral meets the CMS requirements and the encounter/clinical assessment with the patient was, in whole, or in part, for the medical condition(s) listed above, which is the primary reason for home health care. Based on my clinical findings: the service(s) are medically necessary, support the need for home health care, and the homebound criteria are met.  I certify that this patient has had a face to face encounter by myself.  Jonathan Hester M.D. - NPI: [id number]

## 2019-02-11 NOTE — FACE TO FACE
"Face to Face Note  -  Durable Medical Equipment    Jonahtan Hester M.D. - NPI: [id number]  I certify that this patient is under my care and that they had a durable medical equipment(DME)face to face encounter by myself that meets the physician DME face-to-face encounter requirements with this patient on:    Date of encounter:   Patient:                    MRN:                       YOB: 2019  Hilda Blake  [id number]  1940     The encounter with the patient was in whole, or in part, for the following medical condition, which is the primary reason for durable medical equipment:  COPD    I certify that, based on my findings, the following durable medical equipment is medically necessary:  Oxygen.    HOME O2 Saturation Measurements:(Values must be present for Home Oxygen orders)  Room air sat at rest: 88  Room air sat with amb: 85  With liters of O2: 1, O2 sat at rest with O2: 92  With Liters of O2: 1, O2 sat with amb with O2 : 91  Is the patient mobile?: Yes    My Clinical findings support the need for the above equipment due to:  Hypoxia    Supporting Symptoms: The patient requires supplemental oxygen, as the following interventions have been tried with limited or no improvement: \"Bronchodilators and/or steroid inhalers    "

## 2019-02-11 NOTE — DISCHARGE PLANNING
Anticipated Discharge Disposition: [facility] with O2     Action: Discussed discharge with bedside RN. Per RN, pt desating and requiring O2. LSW informed MD via [email or web address].     LSW contacted pt's daughter to discuss CHOICE. Daughter requested referral sent to [facility].    Faxed to [facility].    LSW contacted [facility] to update on above information. [facility] requested that O2 be delivered prior to discharge.    Barriers to Discharge: None    Plan: Await delivery of O2, then transportation will need to be set up.

## 2019-02-11 NOTE — DISCHARGE PLANNING
Received Choice form at 1410.  Agency/Facility Name: [facility]  Referral sent per Choice form at 1415.

## 2019-02-11 NOTE — DISCHARGE PLANNING
Charge nurse informed LSW pt is medically clear to return to [facility]. LSW called wellness nurse at [facility] ([phone]) and  for return call. Approval for pt to return to [facility] needed prior to discharge.

## 2019-02-11 NOTE — THERAPY
"Physical Therapy Evaluation completed.   Bed Mobility:  Supine to Sit: Contact Guard Assist  Transfers: Sit to Stand: Contact Guard Assist  Gait: Level Of Assist: Contact Guard Assist with Front-Wheel Walker       Plan of Care: Patient with no further skilled PT needs in the acute care setting at this time  Discharge Recommendations: Equipment: Front-Wheel Walker. Recommend outpatient or home health transitional care services for continued physical therapy services.    See \"Rehab Therapy-Acute\" Patient Summary Report for complete documentation.     Pt was recently admitted for SOB with COPD excacerbation. Pt had a recent R hip hemiarthoplasty who is now WBAT and has a hx of alzheimers and SDH. Pt was able to demonstrate CGA for all functional mobility at this time w/FWW use. Pt was primarly self limiting and required cues throughout for navigation and reinforcement. Pt was primarily limited due too poor cognition. Pt was able to ambulate for about 100ft w/CGA w/FWW with step to gait and dec FIDELINA. With current functional mobility pt will be able to d/c back to [facility] with 24/7 assist. Per SW pt is to be d/c soon back to [facility] and will have HH therapy. Will recommend HH therapy at this time, pt and nsg staff encouraged to mobilize with pt while in house. Pt will have improved outcomes with therapy in her home setting as she appears to be increasingly confused at times and less participatory with staff here.   "

## 2019-02-11 NOTE — DISCHARGE PLANNING
Anticipated Discharge Disposition: [facility] with HHC    Action: LSW received return call from Shikha with [facility]. Shikha confirmed pt clear to return to [facility]. Shikha to confirm if transportation is available and call LSW.    LSW contacted pt's daughter, Alis, and informed her pt is medically clear and returning to [facility] today. Alis stated pt had [facility] prior to admission and requested it continue. LSW to request HHC order from MD in IDT rounds.    Barriers to Discharge: None    Plan: F/U on HHC order and referral.

## 2019-02-12 PROBLEM — R53.81 DEBILITY: Status: ACTIVE | Noted: 2019-02-12

## 2019-02-12 PROCEDURE — 770006 HCHG ROOM/CARE - MED/SURG/GYN SEMI*

## 2019-02-12 PROCEDURE — 700102 HCHG RX REV CODE 250 W/ 637 OVERRIDE(OP): Performed by: HOSPITALIST

## 2019-02-12 PROCEDURE — 90471 IMMUNIZATION ADMIN: CPT

## 2019-02-12 PROCEDURE — 700111 HCHG RX REV CODE 636 W/ 250 OVERRIDE (IP): Performed by: HOSPITALIST

## 2019-02-12 PROCEDURE — 99233 SBSQ HOSP IP/OBS HIGH 50: CPT | Performed by: INTERNAL MEDICINE

## 2019-02-12 PROCEDURE — 700111 HCHG RX REV CODE 636 W/ 250 OVERRIDE (IP): Performed by: FAMILY MEDICINE

## 2019-02-12 PROCEDURE — A9270 NON-COVERED ITEM OR SERVICE: HCPCS | Performed by: HOSPITALIST

## 2019-02-12 PROCEDURE — 3E02340 INTRODUCTION OF INFLUENZA VACCINE INTO MUSCLE, PERCUTANEOUS APPROACH: ICD-10-PCS | Performed by: FAMILY MEDICINE

## 2019-02-12 PROCEDURE — 90662 IIV NO PRSV INCREASED AG IM: CPT | Performed by: FAMILY MEDICINE

## 2019-02-12 RX ORDER — PREDNISONE 20 MG/1
20 TABLET ORAL DAILY
Status: COMPLETED | OUTPATIENT
Start: 2019-02-13 | End: 2019-02-13

## 2019-02-12 RX ADMIN — BRIMONIDINE TARTRATE 1 DROP: 2 SOLUTION OPHTHALMIC at 16:29

## 2019-02-12 RX ADMIN — TIMOLOL MALEATE 1 DROP: 5 SOLUTION/ DROPS OPHTHALMIC at 16:29

## 2019-02-12 RX ADMIN — INFLUENZA A VIRUS A/MICHIGAN/45/2015 X-275 (H1N1) ANTIGEN (FORMALDEHYDE INACTIVATED), INFLUENZA A VIRUS A/SINGAPORE/INFIMH-16-0019/2016 IVR-186 (H3N2) ANTIGEN (FORMALDEHYDE INACTIVATED), AND INFLUENZA B VIRUS B/MARYLAND/15/2016 BX-69A (A B/COLORADO/6/2017-LIKE VIRUS) ANTIGEN (FORMALDEHYDE INACTIVATED) 0.5 ML: 60; 60; 60 INJECTION, SUSPENSION INTRAMUSCULAR at 04:55

## 2019-02-12 RX ADMIN — AMLODIPINE BESYLATE 5 MG: 5 TABLET ORAL at 04:45

## 2019-02-12 RX ADMIN — PREDNISONE 40 MG: 20 TABLET ORAL at 04:45

## 2019-02-12 RX ADMIN — DOXYCYCLINE 100 MG: 100 TABLET, FILM COATED ORAL at 16:29

## 2019-02-12 RX ADMIN — METOPROLOL TARTRATE 25 MG: 25 TABLET, FILM COATED ORAL at 16:29

## 2019-02-12 RX ADMIN — ENOXAPARIN SODIUM 40 MG: 100 INJECTION SUBCUTANEOUS at 04:44

## 2019-02-12 RX ADMIN — LATANOPROST 1 DROP: 50 SOLUTION OPHTHALMIC at 20:48

## 2019-02-12 RX ADMIN — TIMOLOL MALEATE 1 DROP: 5 SOLUTION/ DROPS OPHTHALMIC at 04:45

## 2019-02-12 RX ADMIN — Medication 2 TABLET: at 04:44

## 2019-02-12 RX ADMIN — METOPROLOL TARTRATE 25 MG: 25 TABLET, FILM COATED ORAL at 04:45

## 2019-02-12 RX ADMIN — BRIMONIDINE TARTRATE 1 DROP: 2 SOLUTION OPHTHALMIC at 04:45

## 2019-02-12 RX ADMIN — DOXYCYCLINE 100 MG: 100 TABLET, FILM COATED ORAL at 04:45

## 2019-02-12 RX ADMIN — LEVETIRACETAM 250 MG: 250 TABLET ORAL at 04:45

## 2019-02-12 ASSESSMENT — ENCOUNTER SYMPTOMS
SPUTUM PRODUCTION: 1
NECK PAIN: 0
SHORTNESS OF BREATH: 1
WHEEZING: 1
NERVOUS/ANXIOUS: 0
BLURRED VISION: 0
MYALGIAS: 0
HEADACHES: 0
HEARTBURN: 0
WEAKNESS: 1
ABDOMINAL PAIN: 0
DEPRESSION: 0
MEMORY LOSS: 0
CHILLS: 0
PALPITATIONS: 0
DIARRHEA: 0
COUGH: 1
NAUSEA: 0
BACK PAIN: 0
FEVER: 0

## 2019-02-12 NOTE — DISCHARGE PLANNING
Anticipated Discharge Disposition: [facility] with [facility] and O2     Action: Pt discussed in IDT rounds. Per MD, pt lethargic this am. MD holding discharge until tomorrow to monitor pt.     LSW contacted Matias at [facility]. O2 delivered. LSW informed of above.    LSW contacted pt's daughter, Alis, and informed of above.     Barriers to Discharge: None    Plan: Await medical clearance.

## 2019-02-12 NOTE — PROGRESS NOTES
[facility] Medicine Daily Progress Note    Date of Service  2/11/2019    Chief Complaint  78 y.o. female admitted 2/8/2019 with respiratory failure.    Hospital Course  Admitted with respiratory failure, appears to have probable COPD exacerbation.  Recent admission for femoral neck fracture, underwent right hip hemiarthroplasty, discharged to skilled nursing facility.    Interval Problem Update  Resp failure - O2 at 1 lpm NC  COPD exac - less wheezing  HTN - controlled  Elevated BS   Low B12    Refused lab draw.    Consultants/Specialty  None    Code Status  Full    Disposition  Home health and O2 ([facility])    Review of Systems  Review of Systems   Constitutional: Positive for malaise/fatigue. Negative for chills and fever.   HENT: Negative for hearing loss and sore throat.    Eyes: Negative for blurred vision.   Respiratory: Positive for cough and wheezing. Negative for shortness of breath.    Cardiovascular: Negative for chest pain, palpitations and leg swelling.   Gastrointestinal: Negative for abdominal pain, diarrhea, heartburn, nausea and vomiting.   Genitourinary: Negative for dysuria.   Musculoskeletal: Positive for joint pain. Negative for back pain and neck pain.   Skin: Negative for rash.   Neurological: Positive for weakness. Negative for dizziness and headaches.   Psychiatric/Behavioral: The patient is not nervous/anxious.         Physical Exam  Temp:  [36.3 °C (97.3 °F)-36.9 °C (98.4 °F)] 36.7 °C (98 °F)  Pulse:  [58-83] 83  Resp:  [16-18] 16  BP: (116-146)/(62-71) 117/69  SpO2:  [86 %-97 %] 92 %    Physical Exam   Constitutional: She appears well-developed.   HENT:   Head: Normocephalic and atraumatic.   Eyes: Pupils are equal, round, and reactive to light. Conjunctivae are normal.   Drooping right upper eyelid   Neck: No tracheal deviation present. No thyromegaly present.   Cardiovascular: Normal rate and regular rhythm.    Pulmonary/Chest: Effort normal. She has wheezes.   Abdominal: Soft. Bowel sounds  are normal. She exhibits no distension. There is no tenderness.   Musculoskeletal: She exhibits no edema.   Lymphadenopathy:     She has no cervical adenopathy.   Neurological: She is alert.   Oriented to person and place   Skin: Skin is warm and dry.   Nursing note and vitals reviewed.      Fluids    Intake/Output Summary (Last 24 hours) at 02/11/19 1654  Last data filed at 02/11/19 1300   Gross per 24 hour   Intake              100 ml   Output                0 ml   Net              100 ml       Laboratory  Recent Labs      02/08/19   2110  02/10/19   0050   WBC  7.8  6.1   RBC  4.26  4.10*   HEMOGLOBIN  12.6  12.1   HEMATOCRIT  40.6  39.2   MCV  95.3  95.6   MCH  29.6  29.5   MCHC  31.0*  30.9*   RDW  47.7  46.1   PLATELETCT  196  185   MPV  11.3  11.0     Recent Labs      02/08/19   2110  02/10/19   0050   SODIUM  143  142   POTASSIUM  3.9  3.9   CHLORIDE  104  107   CO2  30  27   GLUCOSE  135*  193*   BUN  22  20   CREATININE  0.80  0.72   CALCIUM  9.9  8.5     Recent Labs      02/08/19 2110   APTT  28.7   INR  1.11     Recent Labs      02/08/19 2110   BNPBTYPENAT  150*           Imaging  EC-ECHOCARDIOGRAM COMPLETE W/O CONT   Final Result      CT-CTA CHEST PULMONARY ARTERY W/ RECONS   Final Result      No evidence of pulmonary emboli or other acute thoracic abnormality.                 Assessment/Plan  * Acute respiratory failure with hypoxia (HCC)- (present on admission)   Assessment & Plan    Suspect possible chronic component  Keep O2 sats above 92%  Encourage I-S, RT protocol     COPD exacerbation (HCC)- (present on admission)   Assessment & Plan    Prednisone, Doxycycline, RT protocol     S/P hip hemiarthroplasty- (present on admission)   Assessment & Plan    1/12/2019   PT/OT     Noncompliance- (present on admission)   Assessment & Plan    counseling     Hyperglycemia- (present on admission)   Assessment & Plan    Check hemoglobin A1c     Low vitamin B12 level- (present on admission)   Assessment &  Plan    Check homocystine level  Start oral B12     Essential hypertension- (present on admission)   Assessment & Plan    Metoprolol and Norvasc     History of subdural hematoma- (present on admission)   Assessment & Plan    Keppra for seizure prophylaxis     Glaucoma - (present on admission)   Assessment & Plan    Timolol, Brimonidine, Xalatan     Ptosis of eyelid, right- (present on admission)   Assessment & Plan    chronic          VTE prophylaxis: Lovenox

## 2019-02-12 NOTE — DISCHARGE PLANNING
Spoke to Peter at "[employer]"    2/12 transport cancelled. Transport is scheduled for 2/13 at 1130 going to [facility]. OMAYRA Graves notified.

## 2019-02-12 NOTE — CARE PLAN
Problem: Communication  Goal: The ability to communicate needs accurately and effectively will improve  Outcome: PROGRESSING AS EXPECTED  Patient reoriented to situation. Encouraged use of call light. Patient educated on POC    Problem: Discharge Barriers/Planning  Goal: Patient's continuum of care needs will be met  Outcome: PROGRESSING AS EXPECTED  Patient received portable 02 to DC with tomorrow. Patient will return to prior living situation.

## 2019-02-12 NOTE — DISCHARGE SUMMARY
Discharge Summary    CHIEF COMPLAINT ON ADMISSION  Chief Complaint   Patient presents with   • Shortness of Breath     started today, 82% RA per EMS.        Reason for Admission  EMS     CODE STATUS  Full Code    HPI & HOSPITAL COURSE  This is a 78 y.o. female here with probable COPD exacerbation and acute respiratory failure with hypoxia.  Patient was placed on prednisone, covered empirically with doxycycline, placed on RT protocol.  Her symptoms improved.  However she continued to require O2 supplementation.  She had a recent hip hemiarthroplasty for a hip fracture, previously discharged to skilled nursing facility, currently in an assisted living facility.  Physical and occupational therapy came to evaluate her and recommended continued home health services.  Home O2 will be set up for her.  She was noted to have low vitamin B12 level, and hyperglycemia, however she refused further lab draws.    Patient with slight lethargy on February 12, she has ambulated with staff throughout the day.  Patient appears to be near baseline physical function and is cleared for discharge to assisted living facility.       Therefore, she is discharged in good and stable condition to home with organized home healthcare and close outpatient follow-up.    The patient met 2-midnight criteria for an inpatient stay at the time of discharge.      FOLLOW UP ITEMS POST DISCHARGE  None    DISCHARGE DIAGNOSES  Principal Problem:    Acute respiratory failure with hypoxia (HCC) POA: Yes  Active Problems:    S/P hip hemiarthroplasty POA: Yes    COPD exacerbation (HCC) POA: Yes    Essential hypertension POA: Yes    Low vitamin B12 level POA: Yes    Hyperglycemia POA: Yes    Noncompliance POA: Yes    Ptosis of eyelid, right POA: Yes    Glaucoma  POA: Yes    History of subdural hematoma POA: Yes    Debility POA: Unknown  Resolved Problems:    * No resolved hospital problems. *      FOLLOW UP  No future appointments.  [facility] ([facility])  [address] [place] [id number]  [phone]          MEDICATIONS ON DISCHARGE     Medication List      START taking these medications      Instructions   doxycycline monohydrate 100 MG tablet  Commonly known as:  ADOXA   Take 1 Tab by mouth every 12 hours for 1 day.  Dose:  100 mg     guaiFENesin dextromethorphan 100-10 MG/5ML Syrp syrup  Commonly known as:  ROBITUSSIN DM   Take 10 mL by mouth every 6 hours as needed for Cough.  Dose:  10 mL        CONTINUE taking these medications      Instructions   amLODIPine 5 MG Tabs  Commonly known as:  NORVASC   Take 5 mg by mouth every day.  Dose:  5 mg     COMBIGAN 0.2-0.5 % Soln  Generic drug:  Brimonidine Tartrate-Timolol   Place 1 Drop in both eyes 2 Times a Day.  Dose:  1 Drop     latanoprost 0.005 % Soln  Commonly known as:  XALATAN   Place 1 Drop in left eye every evening.  Dose:  1 Drop     levETIRAcetam 250 MG tablet  Commonly known as:  KEPPRA   Take 250 mg by mouth every day.  Dose:  250 mg     metoprolol 25 MG Tabs  Commonly known as:  LOPRESSOR   Take 25 mg by mouth 2 times a day.  Dose:  25 mg     oxyCODONE immediate-release 5 MG Tabs  Commonly known as:  ROXICODONE   Take 5 mg by mouth 2 times a day as needed for Severe Pain.  Dose:  5 mg            Allergies  Allergies   Allergen Reactions   • Tamoxifen Anaphylaxis     bleeding       DIET  Orders Placed This Encounter   Procedures   • Diet Order Regular     Standing Status:   Standing     Number of Occurrences:   1     Order Specific Question:   Diet:     Answer:   Regular [1]       ACTIVITY  As tolerated.  Weight bearing as tolerated    LINES, DRAINS, AND WOUNDS  This is an automated list. Peripheral IVs will be removed prior to discharge.  Peripheral IV 02/08/19 20 G Right Antecubital (Active)   Site Assessment Clean;Dry;Intact 2/11/2019 10:00 AM   Dressing Type Transparent 2/11/2019 10:00 AM   Line Status Flushed;Saline locked;Scrubbed the hub prior to access 2/11/2019 10:00 AM   Dressing Status  Clean;Dry;Intact 2/11/2019 10:00 AM   Dressing Intervention N/A 2/10/2019  8:00 PM   Date Primary Tubing Changed 02/09/19 2/9/2019  4:00 AM   NEXT Primary Tubing Change  02/12/19 2/9/2019  4:00 AM   Infiltration Grading ([facility], CV) 0 2/9/2019  8:00 PM   Phlebitis Scale ([facility] Only) 0 2/9/2019  8:00 PM       Pressure Ulcer  Deep Tissue Injury POA Sacrum Midline (Active)       Pressure Ulcer  Stage 2 POA Shoulder Left Anterior (Active)       Pressure Ulcer  Deep Tissue Injury POA Hip Right Lateral (Active)       Pressure Ulcer  Stage I  POA Head Right Posterior;Lateral (Active)       Wound POA Abrasion Elbow (Active)       Peripheral IV 02/08/19 20 G Right Antecubital (Active)   Site Assessment Clean;Dry;Intact 2/11/2019 10:00 AM   Dressing Type Transparent 2/11/2019 10:00 AM   Line Status Flushed;Saline locked;Scrubbed the hub prior to access 2/11/2019 10:00 AM   Dressing Status Clean;Dry;Intact 2/11/2019 10:00 AM   Dressing Intervention N/A 2/10/2019  8:00 PM   Date Primary Tubing Changed 02/09/19 2/9/2019  4:00 AM   NEXT Primary Tubing Change  02/12/19 2/9/2019  4:00 AM   Infiltration Grading ([facility], CV) 0 2/9/2019  8:00 PM   Phlebitis Scale ([facility] Only) 0 2/9/2019  8:00 PM               MENTAL STATUS ON TRANSFER  Level of Consciousness: Alert  Orientation : oriented to Event, oriented to Place, oriented to Time  Speech: Speech Clear    CONSULTATIONS  None    PROCEDURES  None    LABORATORY  Lab Results   Component Value Date    SODIUM 142 02/10/2019    POTASSIUM 3.9 02/10/2019    CHLORIDE 107 02/10/2019    CO2 27 02/10/2019    GLUCOSE 193 (H) 02/10/2019    BUN 20 02/10/2019    CREATININE 0.72 02/10/2019    CREATININE 0.97 04/14/2011    GLOMRATE >59 11/19/2009        Lab Results   Component Value Date    WBC 6.1 02/10/2019    WBC 7.5 11/19/2009    HEMOGLOBIN 12.1 02/10/2019    HEMATOCRIT 39.2 02/10/2019    PLATELETCT 185 02/10/2019        Total time of the discharge process exceeds 40 minutes.

## 2019-02-12 NOTE — DISCHARGE PLANNING
Received Transport Format 1145.  Spoke to Adrianne at [email or web address].    Transport is scheduled for 2/12 at 1600 going to [facility].     OMAYRA Graves notified of transport time.

## 2019-02-12 NOTE — DISCHARGE PLANNING
Agency/Facility Name: [facility]  Spoke To: Leena   Outcome: Order received and currently being processed. Unable to give an update on delivery time. OMAYRA Graves notified.

## 2019-02-12 NOTE — PROGRESS NOTES
Hospital Medicine Daily Progress Note    Date of Service  2/12/2019    Chief Complaint  78 y.o. female admitted 2/8/2019 with respiratory failure.    Hospital Course  Admitted with respiratory failure, appears to have probable COPD exacerbation.  Recent admission for femoral neck fracture, underwent right hip hemiarthroplasty, discharged to skilled nursing facility.    Interval Problem Update  Lethargic this am  Oriented x 1, states her mother is her caregiver  Ongoing sob  Diarrhea, denies abd pain    Consultants/Specialty  None    Code Status  Full    Disposition  Home health and O2 (intermediate)/[facility]  OOB tid    Review of Systems  Review of Systems   Constitutional: Positive for malaise/fatigue. Negative for chills and fever.   HENT: Negative for hearing loss.    Eyes: Negative for blurred vision.   Respiratory: Positive for cough, sputum production, shortness of breath and wheezing.    Cardiovascular: Negative for chest pain, palpitations and leg swelling.   Gastrointestinal: Negative for abdominal pain, diarrhea, heartburn and nausea.   Genitourinary: Negative for dysuria.   Musculoskeletal: Positive for joint pain. Negative for back pain, myalgias and neck pain.   Skin: Negative for rash.   Neurological: Positive for weakness. Negative for headaches.   Psychiatric/Behavioral: Negative for depression and memory loss. The patient is not nervous/anxious.         Physical Exam  Temp:  [36.1 °C (97 °F)-36.9 °C (98.5 °F)] 36.9 °C (98.5 °F)  Pulse:  [64-78] 78  Resp:  [15-18] 15  BP: (118-143)/(68-75) 118/68  SpO2:  [92 %-95 %] 92 %    Physical Exam   Constitutional: She appears well-developed. No distress.   HENT:   Head: Normocephalic and atraumatic.   Mouth/Throat: No oropharyngeal exudate.   Eyes: Pupils are equal, round, and reactive to light. Conjunctivae are normal. Left eye exhibits no discharge.   Drooping right upper eyelid   Neck: No JVD present. No tracheal deviation present. No thyromegaly present.    Cardiovascular: Normal rate, regular rhythm and intact distal pulses.    Pulmonary/Chest: Effort normal. No respiratory distress. She has wheezes. She has rales.   Abdominal: Soft. Bowel sounds are normal. She exhibits no distension.   Musculoskeletal: She exhibits no edema.   Neurological: She is alert. No cranial nerve deficit. Coordination normal.   Oriented to person and place   Skin: Skin is warm and dry. She is not diaphoretic.   Nursing note and vitals reviewed.      Fluids    Intake/Output Summary (Last 24 hours) at 02/12/19 1637  Last data filed at 02/11/19 2000   Gross per 24 hour   Intake              100 ml   Output                0 ml   Net              100 ml       Laboratory  Recent Labs      02/10/19   0050   WBC  6.1   RBC  4.10*   HEMOGLOBIN  12.1   HEMATOCRIT  39.2   MCV  95.6   MCH  29.5   MCHC  30.9*   RDW  46.1   PLATELETCT  185   MPV  11.0     Recent Labs      02/10/19   0050   SODIUM  142   POTASSIUM  3.9   CHLORIDE  107   CO2  27   GLUCOSE  193*   BUN  20   CREATININE  0.72   CALCIUM  8.5                   Imaging  EC-ECHOCARDIOGRAM COMPLETE W/O CONT   Final Result      CT-CTA CHEST PULMONARY ARTERY W/ RECONS   Final Result      No evidence of pulmonary emboli or other acute thoracic abnormality.                 Assessment/Plan  * Acute respiratory failure with hypoxia (HCC)- (present on admission)   Assessment & Plan    Suspect possible chronic component  Keep O2 sats above 92%  Encourage I-S, RT protocol     COPD exacerbation (HCC)- (present on admission)   Assessment & Plan    Taper Prednisone  -Doxycycline, RT protocol     S/P hip hemiarthroplasty- (present on admission)   Assessment & Plan    1/12/2019   PT/OT     Noncompliance- (present on admission)   Assessment & Plan    counseling     Hyperglycemia- (present on admission)   Assessment & Plan    Check hemoglobin A1c     Low vitamin B12 level- (present on admission)   Assessment & Plan    Check homocystine level  Start oral B12      Essential hypertension- (present on admission)   Assessment & Plan    Metoprolol and Norvasc     Debility   Assessment & Plan    - pt/ot- cga, FWW ordered  - needs h/h,  Encourage activity     History of subdural hematoma- (present on admission)   Assessment & Plan    Keppra for seizure prophylaxis     Glaucoma - (present on admission)   Assessment & Plan    Timolol, Brimonidine, Xalatan     Ptosis of eyelid, right- (present on admission)   Assessment & Plan    chronic          VTE prophylaxis: Lovenox

## 2019-02-12 NOTE — DISCHARGE PLANNING
Anticipated Discharge Disposition: [facility] with [facility] and O2     Action: LSW confirmed O2 tank delivered to bedside. LSW contacted Shikha at [facility], no supplies delivered. CCA to contacted [facility].    Barriers to Discharge: None    Plan: Await delivery of O2 to [facility].    Update: LSW contacted [facility] and [facility] to arrange delivery. [facility] to delivery O2 within 3 hours. Matias at [facility] to call LSW when delivered to confirm pt can return.

## 2019-02-12 NOTE — PROGRESS NOTES
"Assumed patient care at 1900. Received Bedside report. Patient in bed finished meal, alert and oriented x 2. Discussed POC. No s/s of distress. Patient denies any further questions or concerns. Call light in reach, fall precautions in place. Continue hourly rounding. /75   Pulse 70   Temp 36.3 °C (97.3 °F) (Temporal)   Resp 18   Ht 1.626 m (5' 4\") Comment: stated per pt  Wt 52.1 kg (114 lb 13.8 oz)   SpO2 94%   BMI 19.72 kg/m²   "

## 2019-02-13 VITALS
TEMPERATURE: 99.3 F | OXYGEN SATURATION: 91 % | WEIGHT: 114.86 LBS | SYSTOLIC BLOOD PRESSURE: 98 MMHG | HEART RATE: 75 BPM | BODY MASS INDEX: 19.61 KG/M2 | HEIGHT: 64 IN | DIASTOLIC BLOOD PRESSURE: 55 MMHG | RESPIRATION RATE: 16 BRPM

## 2019-02-13 PROCEDURE — 700111 HCHG RX REV CODE 636 W/ 250 OVERRIDE (IP): Performed by: HOSPITALIST

## 2019-02-13 PROCEDURE — 700111 HCHG RX REV CODE 636 W/ 250 OVERRIDE (IP): Performed by: INTERNAL MEDICINE

## 2019-02-13 PROCEDURE — A9270 NON-COVERED ITEM OR SERVICE: HCPCS | Performed by: HOSPITALIST

## 2019-02-13 PROCEDURE — 99239 HOSP IP/OBS DSCHRG MGMT >30: CPT | Performed by: INTERNAL MEDICINE

## 2019-02-13 PROCEDURE — 700102 HCHG RX REV CODE 250 W/ 637 OVERRIDE(OP): Performed by: HOSPITALIST

## 2019-02-13 RX ORDER — GUAIFENESIN/DEXTROMETHORPHAN 100-10MG/5
10 SYRUP ORAL EVERY 6 HOURS PRN
Qty: 840 ML | Refills: 0 | Status: SHIPPED
Start: 2019-02-13 | End: 2020-02-15

## 2019-02-13 RX ORDER — DOXYCYCLINE 100 MG/1
100 TABLET ORAL EVERY 12 HOURS
Qty: 2 TAB | Refills: 0 | Status: SHIPPED | OUTPATIENT
Start: 2019-02-13 | End: 2019-02-14

## 2019-02-13 RX ADMIN — PREDNISONE 20 MG: 20 TABLET ORAL at 04:22

## 2019-02-13 RX ADMIN — LEVETIRACETAM 250 MG: 250 TABLET ORAL at 04:23

## 2019-02-13 RX ADMIN — ENOXAPARIN SODIUM 40 MG: 100 INJECTION SUBCUTANEOUS at 04:23

## 2019-02-13 RX ADMIN — TIMOLOL MALEATE 1 DROP: 5 SOLUTION/ DROPS OPHTHALMIC at 04:23

## 2019-02-13 RX ADMIN — DOXYCYCLINE 100 MG: 100 TABLET, FILM COATED ORAL at 04:22

## 2019-02-13 RX ADMIN — BRIMONIDINE TARTRATE 1 DROP: 2 SOLUTION OPHTHALMIC at 04:23

## 2019-02-13 RX ADMIN — METOPROLOL TARTRATE 25 MG: 25 TABLET, FILM COATED ORAL at 04:23

## 2019-02-13 RX ADMIN — AMLODIPINE BESYLATE 5 MG: 5 TABLET ORAL at 04:23

## 2019-02-13 NOTE — CARE PLAN
Problem: Safety  Goal: Will remain free from injury  Outcome: PROGRESSING AS EXPECTED  Bed in low position, bed alarm on, call light in reach, instructed pt to use call light for assistance.

## 2019-02-13 NOTE — CARE PLAN
Problem: Safety  Goal: Will remain free from injury    Intervention: Provide assistance with mobility  Patient is impulsive, up to the bathroom with front wheel walker, shuffling, weak gait noted. Patient ambulated with fww in the hallways, tolerated well. Patient is confused and does not call for assistance, need reinforcement to call before getting up      Problem: Knowledge Deficit  Goal: Knowledge of disease process/condition, treatment plan, diagnostic tests, and medications will improve    Intervention: Assess knowledge level of disease process/condition, treatment plan, diagnostic tests, and medications  Patient currently on 1L O2 via nasal cannula, denies any shortness of breath--patient encouraged to use incentive spirometer-return demonstration witnessed. [facility]

## 2019-02-13 NOTE — PROGRESS NOTES
"Assumed care of patient at 0700 . Received report from RN. Patient is AOX2 . Assessment complete. Labs reviewed.Patient and RN discussed plan of care. Patient questions answered. Patient needs are met at this time. Bed in lowest and locked position. Upper bed rails up.  Call light is within reach. Hourly rounding in place. /77   Pulse 73   Temp 36.9 °C (98.5 °F) (Temporal)   Resp 20   Ht 1.626 m (5' 4.02\")   Wt 52.1 kg (114 lb 13.8 oz)   SpO2 91%   BMI 19.71 kg/m²     "

## 2019-02-13 NOTE — PROGRESS NOTES
Patient being discharged to [facility].Patient is AOX2-3. PIV removed. Patient is on 1LO2.

## 2019-02-13 NOTE — PROGRESS NOTES
Assumed patient care at 0700.  Patient is alert, awake, and oriented to self, place, and time, non labored breathing on 1L O2 via nasal cannula.  Patient is confused and impulsive at times. Bed alarm in place.  Patient denies any generalized pain, chest pain or shortness of breath.  Lungs are diminished. Abdomen is soft, non tender. Last bowel movement was 2/12/19.  Patient is continent of urine and bowels, but have urgency episodes when needing to urinate.  Patient has been up to the bathroom, 1 person assist with FWW. Patient also ambulated hallway in the afternoon with O2 and FWW-tolerated well.  Skin is intact. Patient awaiting medical clearance. Transportation set up at 1000 for [facility] tomorrow 2/12/19 for possible discharge. Bed in lowest position. Call light and belongings within reach. [facility]

## 2019-02-13 NOTE — CARE PLAN
Problem: Nutritional:  Goal: Achieve adequate nutritional intake  Patient will consume >50% of meals   Outcome: MET Date Met: 02/13/19  Per chart pt PO % of meals. Pt is eating well. RD available prn

## 2019-02-13 NOTE — DISCHARGE PLANNING
Anticipated Discharge Disposition: [facility]    Action: Discharge order received from MD. LSW contacted Shikha at [facility] and informed. [facility] does not have any transportation available.    LSW faxed transport form to [facility].    LSW contacted pt's daughter, Alis. Discussed discharge. LSW informed of IMM right to appeal. Alis expressed understanding.    Barriers to Discharge: None    Plan: Await transport time.

## 2019-02-13 NOTE — DISCHARGE INSTRUCTIONS
Discharge Instructions    Discharged to group home by medical transportation with escort. Discharged via wheelchair, hospital escort: Yes.  Special equipment needed: Not Applicable    Be sure to schedule a follow-up appointment with your primary care doctor or any specialists as instructed.     Discharge Plan:   Diet Plan: Discussed  Activity Level: Discussed  Smoking Cessation Offered: Patient Refused  Confirmed Follow up Appointment: Appointment Scheduled  Confirmed Symptoms Management: Discussed  Influenza Vaccine Indication: Indicated: 65 years and older  Influenza Vaccine Given - only chart on this line when given: Influenza Vaccine Given (See MAR)    I understand that a diet low in cholesterol, fat, and sodium is recommended for good health. Unless I have been given specific instructions below for another diet, I accept this instruction as my diet prescription.   Other diet: Regular Diet    Special Instructions: None    · Is patient discharged on Warfarin / Coumadin?   No     Depression / Suicide Risk    As you are discharged from this [facility] facility, it is important to learn how to keep safe from harming yourself.    Recognize the warning signs:  · Abrupt changes in personality, positive or negative- including increase in energy   · Giving away possessions  · Change in eating patterns- significant weight changes-  positive or negative  · Change in sleeping patterns- unable to sleep or sleeping all the time   · Unwillingness or inability to communicate  · Depression  · Unusual sadness, discouragement and loneliness  · Talk of wanting to die  · Neglect of personal appearance   · Rebelliousness- reckless behavior  · Withdrawal from people/activities they love  · Confusion- inability to concentrate     If you or a loved one observes any of these behaviors or has concerns about self-harm, here's what you can do:  · Talk about it- your feelings and reasons for harming yourself  · Remove any means that you  might use to hurt yourself (examples: pills, rope, extension cords, firearm)  · Get professional help from the community (Mental Health, Substance Abuse, psychological counseling)  · Do not be alone:Call your Safe Contact- someone whom you trust who will be there for you.  · Call your local CRISIS HOTLINE 944-7547 or 543-166-0741  · Call your local Children's Mobile Crisis Response Team [place] (895) 946-3024 or www.Wisecam  · Call the toll free National Suicide Prevention Hotlines   · National Suicide Prevention Lifeline 963-808-CQOE (3159)  · National Hope Line Network 800-SUICIDE (839-3315)

## 2019-02-13 NOTE — PROGRESS NOTES
Report received, pt awake, alert, bed alarm on, bed in low position, call light in reach, instructed pt to use call light for assistance.

## 2019-02-14 ENCOUNTER — PATIENT OUTREACH (OUTPATIENT)
Dept: HEALTH INFORMATION MANAGEMENT | Facility: OTHER | Age: 79
End: 2019-02-14

## 2019-02-14 LAB
BACTERIA BLD CULT: NORMAL
SIGNIFICANT IND 70042: NORMAL
SITE SITE: NORMAL
SOURCE SOURCE: NORMAL

## 2019-02-14 NOTE — PROGRESS NOTES
Outbound call to [facility] for post discharge medication review. Unable to reach. Unable to leave  as it has not been set up. Reviewed medications against discharge summary 02/13/19. No clinically significant interactions noted.     Leena Vargas, PharmD

## 2019-03-15 ENCOUNTER — PATIENT OUTREACH (OUTPATIENT)
Dept: HEALTH INFORMATION MANAGEMENT | Facility: OTHER | Age: 79
End: 2019-03-15

## 2019-03-18 NOTE — PROGRESS NOTES
Hilda Blake was admitted to [facility] on 2/8/19 for Acute Respiratory Failure with Hypoxia, and was discharged on 2/13/19. [facility] Patient Advocate was able to successfully engage with patient’s authorized caregiver post-discharge. Per discharge orders, patient began home health services with [facility] on 2/14/19. Patient had a follow-up appointment with Dr. Oswald ([facility]) on 2/27/19. PPS screening was conducted at 60%.

## 2019-04-24 ENCOUNTER — HOSPITAL ENCOUNTER (OUTPATIENT)
Facility: MEDICAL CENTER | Age: 79
End: 2019-04-24
Attending: INTERNAL MEDICINE
Payer: MEDICARE

## 2019-04-24 LAB
25(OH)D3 SERPL-MCNC: 37 NG/ML (ref 30–100)
ALBUMIN SERPL BCP-MCNC: 4.1 G/DL (ref 3.2–4.9)
ALBUMIN/GLOB SERPL: 1.4 G/DL
ALP SERPL-CCNC: 87 U/L (ref 30–99)
ALT SERPL-CCNC: 9 U/L (ref 2–50)
ANION GAP SERPL CALC-SCNC: 8 MMOL/L (ref 0–11.9)
AST SERPL-CCNC: 14 U/L (ref 12–45)
BASOPHILS # BLD AUTO: 0.6 % (ref 0–1.8)
BASOPHILS # BLD: 0.05 K/UL (ref 0–0.12)
BILIRUB SERPL-MCNC: 0.5 MG/DL (ref 0.1–1.5)
BUN SERPL-MCNC: 27 MG/DL (ref 8–22)
CALCIUM SERPL-MCNC: 9.9 MG/DL (ref 8.5–10.5)
CHLORIDE SERPL-SCNC: 105 MMOL/L (ref 96–112)
CHOLEST SERPL-MCNC: 212 MG/DL (ref 100–199)
CO2 SERPL-SCNC: 27 MMOL/L (ref 20–33)
CREAT SERPL-MCNC: 0.76 MG/DL (ref 0.5–1.4)
EOSINOPHIL # BLD AUTO: 0.14 K/UL (ref 0–0.51)
EOSINOPHIL NFR BLD: 1.8 % (ref 0–6.9)
ERYTHROCYTE [DISTWIDTH] IN BLOOD BY AUTOMATED COUNT: 47.8 FL (ref 35.9–50)
EST. AVERAGE GLUCOSE BLD GHB EST-MCNC: 134 MG/DL
FOLATE SERPL-MCNC: >23.6 NG/ML
GLOBULIN SER CALC-MCNC: 3 G/DL (ref 1.9–3.5)
GLUCOSE SERPL-MCNC: 103 MG/DL (ref 65–99)
HBA1C MFR BLD: 6.3 % (ref 0–5.6)
HCT VFR BLD AUTO: 45.8 % (ref 37–47)
HDLC SERPL-MCNC: 86 MG/DL
HGB BLD-MCNC: 14.7 G/DL (ref 12–16)
IMM GRANULOCYTES # BLD AUTO: 0.02 K/UL (ref 0–0.11)
IMM GRANULOCYTES NFR BLD AUTO: 0.3 % (ref 0–0.9)
LDLC SERPL CALC-MCNC: 109 MG/DL
LYMPHOCYTES # BLD AUTO: 1.64 K/UL (ref 1–4.8)
LYMPHOCYTES NFR BLD: 20.6 % (ref 22–41)
MCH RBC QN AUTO: 29.2 PG (ref 27–33)
MCHC RBC AUTO-ENTMCNC: 32.1 G/DL (ref 33.6–35)
MCV RBC AUTO: 91.1 FL (ref 81.4–97.8)
MONOCYTES # BLD AUTO: 0.7 K/UL (ref 0–0.85)
MONOCYTES NFR BLD AUTO: 8.8 % (ref 0–13.4)
NEUTROPHILS # BLD AUTO: 5.43 K/UL (ref 2–7.15)
NEUTROPHILS NFR BLD: 67.9 % (ref 44–72)
NRBC # BLD AUTO: 0 K/UL
NRBC BLD-RTO: 0 /100 WBC
PLATELET # BLD AUTO: 210 K/UL (ref 164–446)
PMV BLD AUTO: 11.7 FL (ref 9–12.9)
POTASSIUM SERPL-SCNC: 4.2 MMOL/L (ref 3.6–5.5)
PROT SERPL-MCNC: 7.1 G/DL (ref 6–8.2)
RBC # BLD AUTO: 5.03 M/UL (ref 4.2–5.4)
SODIUM SERPL-SCNC: 140 MMOL/L (ref 135–145)
TRIGL SERPL-MCNC: 87 MG/DL (ref 0–149)
TSH SERPL DL<=0.005 MIU/L-ACNC: 2.61 UIU/ML (ref 0.38–5.33)
VIT B12 SERPL-MCNC: 238 PG/ML (ref 211–911)
WBC # BLD AUTO: 8 K/UL (ref 4.8–10.8)

## 2019-04-24 PROCEDURE — 36415 COLL VENOUS BLD VENIPUNCTURE: CPT

## 2019-04-24 PROCEDURE — 84443 ASSAY THYROID STIM HORMONE: CPT

## 2019-04-24 PROCEDURE — 82607 VITAMIN B-12: CPT

## 2019-04-24 PROCEDURE — 80053 COMPREHEN METABOLIC PANEL: CPT

## 2019-04-24 PROCEDURE — 80061 LIPID PANEL: CPT

## 2019-04-24 PROCEDURE — 85025 COMPLETE CBC W/AUTO DIFF WBC: CPT

## 2019-04-24 PROCEDURE — 82746 ASSAY OF FOLIC ACID SERUM: CPT

## 2019-04-24 PROCEDURE — 83036 HEMOGLOBIN GLYCOSYLATED A1C: CPT

## 2019-04-24 PROCEDURE — 82306 VITAMIN D 25 HYDROXY: CPT

## 2019-07-12 ENCOUNTER — HOSPITAL ENCOUNTER (OUTPATIENT)
Dept: LAB | Facility: MEDICAL CENTER | Age: 79
End: 2019-07-12
Attending: OPHTHALMOLOGY
Payer: MEDICARE

## 2019-07-12 LAB
ANION GAP SERPL CALC-SCNC: 9 MMOL/L (ref 0–11.9)
BASOPHILS # BLD AUTO: 0.5 % (ref 0–1.8)
BASOPHILS # BLD: 0.04 K/UL (ref 0–0.12)
BUN SERPL-MCNC: 32 MG/DL (ref 8–22)
CALCIUM SERPL-MCNC: 9.8 MG/DL (ref 8.5–10.5)
CHLORIDE SERPL-SCNC: 107 MMOL/L (ref 96–112)
CO2 SERPL-SCNC: 25 MMOL/L (ref 20–33)
CREAT SERPL-MCNC: 0.84 MG/DL (ref 0.5–1.4)
EOSINOPHIL # BLD AUTO: 0.13 K/UL (ref 0–0.51)
EOSINOPHIL NFR BLD: 1.6 % (ref 0–6.9)
ERYTHROCYTE [DISTWIDTH] IN BLOOD BY AUTOMATED COUNT: 51.3 FL (ref 35.9–50)
FASTING STATUS PATIENT QL REPORTED: NORMAL
GLUCOSE SERPL-MCNC: 75 MG/DL (ref 65–99)
HCT VFR BLD AUTO: 50.3 % (ref 37–47)
HGB BLD-MCNC: 15.2 G/DL (ref 12–16)
IMM GRANULOCYTES # BLD AUTO: 0.02 K/UL (ref 0–0.11)
IMM GRANULOCYTES NFR BLD AUTO: 0.2 % (ref 0–0.9)
LYMPHOCYTES # BLD AUTO: 1.66 K/UL (ref 1–4.8)
LYMPHOCYTES NFR BLD: 20.4 % (ref 22–41)
MCH RBC QN AUTO: 28.1 PG (ref 27–33)
MCHC RBC AUTO-ENTMCNC: 30.2 G/DL (ref 33.6–35)
MCV RBC AUTO: 93 FL (ref 81.4–97.8)
MONOCYTES # BLD AUTO: 0.84 K/UL (ref 0–0.85)
MONOCYTES NFR BLD AUTO: 10.3 % (ref 0–13.4)
NEUTROPHILS # BLD AUTO: 5.44 K/UL (ref 2–7.15)
NEUTROPHILS NFR BLD: 67 % (ref 44–72)
NRBC # BLD AUTO: 0 K/UL
NRBC BLD-RTO: 0 /100 WBC
PLATELET # BLD AUTO: 194 K/UL (ref 164–446)
PMV BLD AUTO: 12.1 FL (ref 9–12.9)
POTASSIUM SERPL-SCNC: 4.4 MMOL/L (ref 3.6–5.5)
RBC # BLD AUTO: 5.41 M/UL (ref 4.2–5.4)
SODIUM SERPL-SCNC: 141 MMOL/L (ref 135–145)
WBC # BLD AUTO: 8.1 K/UL (ref 4.8–10.8)

## 2019-07-12 PROCEDURE — 80048 BASIC METABOLIC PNL TOTAL CA: CPT

## 2019-07-12 PROCEDURE — 85025 COMPLETE CBC W/AUTO DIFF WBC: CPT

## 2019-07-12 PROCEDURE — 36415 COLL VENOUS BLD VENIPUNCTURE: CPT

## 2020-01-24 ENCOUNTER — HOSPITAL ENCOUNTER (OUTPATIENT)
Facility: MEDICAL CENTER | Age: 80
End: 2020-01-24
Attending: NURSE PRACTITIONER
Payer: MEDICARE

## 2020-01-24 PROCEDURE — 82746 ASSAY OF FOLIC ACID SERUM: CPT

## 2020-01-24 PROCEDURE — 82607 VITAMIN B-12: CPT

## 2020-01-24 PROCEDURE — 82306 VITAMIN D 25 HYDROXY: CPT

## 2020-01-24 PROCEDURE — 84443 ASSAY THYROID STIM HORMONE: CPT

## 2020-01-24 PROCEDURE — 83036 HEMOGLOBIN GLYCOSYLATED A1C: CPT

## 2020-01-24 PROCEDURE — 80177 DRUG SCRN QUAN LEVETIRACETAM: CPT

## 2020-01-24 PROCEDURE — 80053 COMPREHEN METABOLIC PANEL: CPT

## 2020-01-24 PROCEDURE — 85025 COMPLETE CBC W/AUTO DIFF WBC: CPT

## 2020-01-29 LAB
25(OH)D3 SERPL-MCNC: 41 NG/ML (ref 30–100)
ALBUMIN SERPL BCP-MCNC: 4.1 G/DL (ref 3.2–4.9)
ALBUMIN/GLOB SERPL: 1.4 G/DL
ALP SERPL-CCNC: 94 U/L (ref 30–99)
ALT SERPL-CCNC: 8 U/L (ref 2–50)
ANION GAP SERPL CALC-SCNC: 12 MMOL/L (ref 0–11.9)
AST SERPL-CCNC: 13 U/L (ref 12–45)
BASOPHILS # BLD AUTO: 0.6 % (ref 0–1.8)
BASOPHILS # BLD: 0.04 K/UL (ref 0–0.12)
BILIRUB SERPL-MCNC: 0.7 MG/DL (ref 0.1–1.5)
BUN SERPL-MCNC: 27 MG/DL (ref 8–22)
CALCIUM SERPL-MCNC: 10 MG/DL (ref 8.5–10.5)
CHLORIDE SERPL-SCNC: 106 MMOL/L (ref 96–112)
CO2 SERPL-SCNC: 26 MMOL/L (ref 20–33)
CREAT SERPL-MCNC: 1 MG/DL (ref 0.5–1.4)
EOSINOPHIL # BLD AUTO: 0.16 K/UL (ref 0–0.51)
EOSINOPHIL NFR BLD: 2.2 % (ref 0–6.9)
ERYTHROCYTE [DISTWIDTH] IN BLOOD BY AUTOMATED COUNT: 48.7 FL (ref 35.9–50)
EST. AVERAGE GLUCOSE BLD GHB EST-MCNC: 128 MG/DL
FOLATE SERPL-MCNC: >24 NG/ML
GLOBULIN SER CALC-MCNC: 2.9 G/DL (ref 1.9–3.5)
GLUCOSE SERPL-MCNC: 90 MG/DL (ref 65–99)
HBA1C MFR BLD: 6.1 % (ref 0–5.6)
HCT VFR BLD AUTO: 47.9 % (ref 37–47)
HGB BLD-MCNC: 15 G/DL (ref 12–16)
IMM GRANULOCYTES # BLD AUTO: 0.02 K/UL (ref 0–0.11)
IMM GRANULOCYTES NFR BLD AUTO: 0.3 % (ref 0–0.9)
LYMPHOCYTES # BLD AUTO: 1.67 K/UL (ref 1–4.8)
LYMPHOCYTES NFR BLD: 23.4 % (ref 22–41)
MCH RBC QN AUTO: 29.7 PG (ref 27–33)
MCHC RBC AUTO-ENTMCNC: 31.3 G/DL (ref 33.6–35)
MCV RBC AUTO: 94.9 FL (ref 81.4–97.8)
MONOCYTES # BLD AUTO: 0.71 K/UL (ref 0–0.85)
MONOCYTES NFR BLD AUTO: 9.9 % (ref 0–13.4)
NEUTROPHILS # BLD AUTO: 4.54 K/UL (ref 2–7.15)
NEUTROPHILS NFR BLD: 63.6 % (ref 44–72)
NRBC # BLD AUTO: 0 K/UL
NRBC BLD-RTO: 0 /100 WBC
PLATELET # BLD AUTO: 161 K/UL (ref 164–446)
PMV BLD AUTO: 13 FL (ref 9–12.9)
POTASSIUM SERPL-SCNC: 4.7 MMOL/L (ref 3.6–5.5)
PROT SERPL-MCNC: 7 G/DL (ref 6–8.2)
RBC # BLD AUTO: 5.05 M/UL (ref 4.2–5.4)
SODIUM SERPL-SCNC: 144 MMOL/L (ref 135–145)
TSH SERPL DL<=0.005 MIU/L-ACNC: 2.44 UIU/ML (ref 0.38–5.33)
VIT B12 SERPL-MCNC: 225 PG/ML (ref 211–911)
WBC # BLD AUTO: 7.1 K/UL (ref 4.8–10.8)

## 2020-01-30 LAB — LEVETIRACETAM SERPL-MCNC: 8 UG/ML (ref 12–46)

## 2020-02-15 ENCOUNTER — APPOINTMENT (OUTPATIENT)
Dept: RADIOLOGY | Facility: MEDICAL CENTER | Age: 80
End: 2020-02-15
Attending: EMERGENCY MEDICINE
Payer: MEDICARE

## 2020-02-15 ENCOUNTER — HOSPITAL ENCOUNTER (OUTPATIENT)
Facility: MEDICAL CENTER | Age: 80
End: 2020-02-21
Attending: EMERGENCY MEDICINE | Admitting: INTERNAL MEDICINE
Payer: MEDICARE

## 2020-02-15 DIAGNOSIS — R53.81 DEBILITY: ICD-10-CM

## 2020-02-15 DIAGNOSIS — R26.2 UNABLE TO AMBULATE: ICD-10-CM

## 2020-02-15 DIAGNOSIS — I10 HTN (HYPERTENSION), BENIGN: ICD-10-CM

## 2020-02-15 DIAGNOSIS — R07.89 CHEST WALL PAIN: ICD-10-CM
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
## 2020-02-15 LAB
ALBUMIN SERPL BCP-MCNC: 4.2 G/DL (ref 3.2–4.9)
ALBUMIN/GLOB SERPL: 1.4 G/DL
ALP SERPL-CCNC: 83 U/L (ref 30–99)
ALT SERPL-CCNC: 11 U/L (ref 2–50)
ANION GAP SERPL CALC-SCNC: 9 MMOL/L (ref 0–11.9)
APPEARANCE UR: CLEAR
AST SERPL-CCNC: 16 U/L (ref 12–45)
BACTERIA #/AREA URNS HPF: NEGATIVE /HPF
BASOPHILS # BLD AUTO: 0.3 % (ref 0–1.8)
BASOPHILS # BLD: 0.04 K/UL (ref 0–0.12)
BILIRUB SERPL-MCNC: 1 MG/DL (ref 0.1–1.5)
BILIRUB UR QL STRIP.AUTO: NEGATIVE
BUN SERPL-MCNC: 27 MG/DL (ref 8–22)
CALCIUM SERPL-MCNC: 9.4 MG/DL (ref 8.5–10.5)
CHLORIDE SERPL-SCNC: 107 MMOL/L (ref 96–112)
CO2 SERPL-SCNC: 26 MMOL/L (ref 20–33)
COLOR UR: YELLOW
CREAT SERPL-MCNC: 1.02 MG/DL (ref 0.5–1.4)
EOSINOPHIL # BLD AUTO: 0.09 K/UL (ref 0–0.51)
EOSINOPHIL NFR BLD: 0.8 % (ref 0–6.9)
EPI CELLS #/AREA URNS HPF: NEGATIVE /HPF
ERYTHROCYTE [DISTWIDTH] IN BLOOD BY AUTOMATED COUNT: 49.1 FL (ref 35.9–50)
GLOBULIN SER CALC-MCNC: 2.9 G/DL (ref 1.9–3.5)
GLUCOSE SERPL-MCNC: 82 MG/DL (ref 65–99)
GLUCOSE UR STRIP.AUTO-MCNC: NEGATIVE MG/DL
HCT VFR BLD AUTO: 46.5 % (ref 37–47)
HGB BLD-MCNC: 15 G/DL (ref 12–16)
HYALINE CASTS #/AREA URNS LPF: ABNORMAL /LPF
IMM GRANULOCYTES # BLD AUTO: 0.08 K/UL (ref 0–0.11)
IMM GRANULOCYTES NFR BLD AUTO: 0.7 % (ref 0–0.9)
KETONES UR STRIP.AUTO-MCNC: ABNORMAL MG/DL
LEUKOCYTE ESTERASE UR QL STRIP.AUTO: ABNORMAL
LYMPHOCYTES # BLD AUTO: 1.33 K/UL (ref 1–4.8)
LYMPHOCYTES NFR BLD: 11.6 % (ref 22–41)
MCH RBC QN AUTO: 30.5 PG (ref 27–33)
MCHC RBC AUTO-ENTMCNC: 32.3 G/DL (ref 33.6–35)
MCV RBC AUTO: 94.5 FL (ref 81.4–97.8)
MICRO URNS: ABNORMAL
MONOCYTES # BLD AUTO: 1.06 K/UL (ref 0–0.85)
MONOCYTES NFR BLD AUTO: 9.3 % (ref 0–13.4)
NEUTROPHILS # BLD AUTO: 8.84 K/UL (ref 2–7.15)
NEUTROPHILS NFR BLD: 77.3 % (ref 44–72)
NITRITE UR QL STRIP.AUTO: NEGATIVE
NRBC # BLD AUTO: 0 K/UL
NRBC BLD-RTO: 0 /100 WBC
PH UR STRIP.AUTO: 5.5 [PH] (ref 5–8)
PLATELET # BLD AUTO: 196 K/UL (ref 164–446)
PMV BLD AUTO: 12 FL (ref 9–12.9)
POTASSIUM SERPL-SCNC: 4.1 MMOL/L (ref 3.6–5.5)
PROT SERPL-MCNC: 7.1 G/DL (ref 6–8.2)
PROT UR QL STRIP: NEGATIVE MG/DL
RBC # BLD AUTO: 4.92 M/UL (ref 4.2–5.4)
RBC # URNS HPF: ABNORMAL /HPF
RBC UR QL AUTO: NEGATIVE
SODIUM SERPL-SCNC: 142 MMOL/L (ref 135–145)
SP GR UR STRIP.AUTO: 1.02
UROBILINOGEN UR STRIP.AUTO-MCNC: 1 MG/DL
WBC # BLD AUTO: 11.4 K/UL (ref 4.8–10.8)
WBC #/AREA URNS HPF: ABNORMAL /HPF

## 2020-02-15 PROCEDURE — 700102 HCHG RX REV CODE 250 W/ 637 OVERRIDE(OP): Performed by: INTERNAL MEDICINE

## 2020-02-15 PROCEDURE — A9270 NON-COVERED ITEM OR SERVICE: HCPCS | Performed by: INTERNAL MEDICINE

## 2020-02-15 PROCEDURE — 85025 COMPLETE CBC W/AUTO DIFF WBC: CPT

## 2020-02-15 PROCEDURE — 80053 COMPREHEN METABOLIC PANEL: CPT

## 2020-02-15 PROCEDURE — 700102 HCHG RX REV CODE 250 W/ 637 OVERRIDE(OP): Performed by: EMERGENCY MEDICINE

## 2020-02-15 PROCEDURE — G0378 HOSPITAL OBSERVATION PER HR: HCPCS

## 2020-02-15 PROCEDURE — 71101 X-RAY EXAM UNILAT RIBS/CHEST: CPT | Mod: LT

## 2020-02-15 PROCEDURE — 99219 PR INITIAL OBSERVATION CARE,LEVL II: CPT | Performed by: INTERNAL MEDICINE

## 2020-02-15 PROCEDURE — 99285 EMERGENCY DEPT VISIT HI MDM: CPT

## 2020-02-15 PROCEDURE — 81001 URINALYSIS AUTO W/SCOPE: CPT

## 2020-02-15 PROCEDURE — A9270 NON-COVERED ITEM OR SERVICE: HCPCS | Performed by: EMERGENCY MEDICINE

## 2020-02-15 RX ORDER — MULTIVIT-MIN/IRON/FOLIC ACID/K 18-600-40
2000 CAPSULE ORAL DAILY
COMMUNITY

## 2020-02-15 RX ORDER — LATANOPROST 50 UG/ML
1 SOLUTION/ DROPS OPHTHALMIC EVERY EVENING
COMMUNITY

## 2020-02-15 RX ORDER — AMLODIPINE BESYLATE 5 MG/1
5 TABLET ORAL ONCE
Status: COMPLETED | OUTPATIENT
Start: 2020-02-15 | End: 2020-02-15

## 2020-02-15 RX ORDER — BRIMONIDINE TARTRATE 2 MG/ML
1 SOLUTION/ DROPS OPHTHALMIC 2 TIMES DAILY
Status: DISCONTINUED | OUTPATIENT
Start: 2020-02-15 | End: 2020-02-21 | Stop reason: HOSPADM

## 2020-02-15 RX ORDER — GUAIFENESIN/DEXTROMETHORPHAN 100-10MG/5
10 SYRUP ORAL EVERY 6 HOURS PRN
Status: DISCONTINUED | OUTPATIENT
Start: 2020-02-15 | End: 2020-02-21 | Stop reason: HOSPADM

## 2020-02-15 RX ORDER — ACETAMINOPHEN 325 MG/1
650 TABLET ORAL ONCE
Status: COMPLETED | OUTPATIENT
Start: 2020-02-15 | End: 2020-02-15

## 2020-02-15 RX ORDER — OXYCODONE HYDROCHLORIDE 5 MG/1
5 TABLET ORAL 2 TIMES DAILY PRN
Status: DISCONTINUED | OUTPATIENT
Start: 2020-02-15 | End: 2020-02-18

## 2020-02-15 RX ORDER — ACETAMINOPHEN 325 MG/1
650 TABLET ORAL EVERY 6 HOURS PRN
Status: DISCONTINUED | OUTPATIENT
Start: 2020-02-15 | End: 2020-02-21 | Stop reason: HOSPADM

## 2020-02-15 RX ORDER — AMLODIPINE BESYLATE 5 MG/1
5 TABLET ORAL DAILY
Status: DISCONTINUED | OUTPATIENT
Start: 2020-02-16 | End: 2020-02-15

## 2020-02-15 RX ORDER — POLYETHYLENE GLYCOL 3350 17 G/17G
1 POWDER, FOR SOLUTION ORAL
Status: DISCONTINUED | OUTPATIENT
Start: 2020-02-15 | End: 2020-02-21 | Stop reason: HOSPADM

## 2020-02-15 RX ORDER — LEVETIRACETAM 250 MG/1
250 TABLET ORAL
COMMUNITY

## 2020-02-15 RX ORDER — BISACODYL 10 MG
10 SUPPOSITORY, RECTAL RECTAL
Status: DISCONTINUED | OUTPATIENT
Start: 2020-02-15 | End: 2020-02-21 | Stop reason: HOSPADM

## 2020-02-15 RX ORDER — FOLIC ACID 1 MG/1
1 TABLET ORAL
COMMUNITY

## 2020-02-15 RX ORDER — ONDANSETRON 4 MG/1
4 TABLET, ORALLY DISINTEGRATING ORAL EVERY 4 HOURS PRN
Status: DISCONTINUED | OUTPATIENT
Start: 2020-02-15 | End: 2020-02-21 | Stop reason: HOSPADM

## 2020-02-15 RX ORDER — AMLODIPINE BESYLATE 5 MG/1
5 TABLET ORAL
Status: DISCONTINUED | OUTPATIENT
Start: 2020-02-15 | End: 2020-02-19

## 2020-02-15 RX ORDER — AMLODIPINE BESYLATE 5 MG/1
10 TABLET ORAL DAILY
Status: DISCONTINUED | OUTPATIENT
Start: 2020-02-16 | End: 2020-02-15

## 2020-02-15 RX ORDER — ENALAPRILAT 1.25 MG/ML
1.25 INJECTION INTRAVENOUS EVERY 6 HOURS PRN
Status: DISCONTINUED | OUTPATIENT
Start: 2020-02-15 | End: 2020-02-21 | Stop reason: HOSPADM

## 2020-02-15 RX ORDER — ONDANSETRON 2 MG/ML
4 INJECTION INTRAMUSCULAR; INTRAVENOUS EVERY 4 HOURS PRN
Status: DISCONTINUED | OUTPATIENT
Start: 2020-02-15 | End: 2020-02-21 | Stop reason: HOSPADM

## 2020-02-15 RX ORDER — LEVETIRACETAM 250 MG/1
250 TABLET ORAL
Status: DISCONTINUED | OUTPATIENT
Start: 2020-02-17 | End: 2020-02-21 | Stop reason: HOSPADM

## 2020-02-15 RX ORDER — AMOXICILLIN 250 MG
2 CAPSULE ORAL 2 TIMES DAILY
Status: DISCONTINUED | OUTPATIENT
Start: 2020-02-15 | End: 2020-02-21 | Stop reason: HOSPADM

## 2020-02-15 RX ORDER — LATANOPROST 50 UG/ML
1 SOLUTION/ DROPS OPHTHALMIC NIGHTLY
Status: DISCONTINUED | OUTPATIENT
Start: 2020-02-16 | End: 2020-02-21 | Stop reason: HOSPADM

## 2020-02-15 RX ORDER — LABETALOL HYDROCHLORIDE 5 MG/ML
10 INJECTION, SOLUTION INTRAVENOUS EVERY 4 HOURS PRN
Status: DISCONTINUED | OUTPATIENT
Start: 2020-02-15 | End: 2020-02-21 | Stop reason: HOSPADM

## 2020-02-15 RX ADMIN — AMLODIPINE BESYLATE 5 MG: 5 TABLET ORAL at 21:57

## 2020-02-15 RX ADMIN — BRIMONIDINE TARTRATE 1 DROP: 2 SOLUTION OPHTHALMIC at 21:58

## 2020-02-15 RX ADMIN — ACETAMINOPHEN 650 MG: 325 TABLET, FILM COATED ORAL at 17:29

## 2020-02-15 ASSESSMENT — ENCOUNTER SYMPTOMS
HEMOPTYSIS: 0
DOUBLE VISION: 0
WEIGHT LOSS: 0
HALLUCINATIONS: 0
BACK PAIN: 0
NAUSEA: 0
PHOTOPHOBIA: 0
VOMITING: 0
ORTHOPNEA: 0
FOCAL WEAKNESS: 0
BLURRED VISION: 0
FEVER: 0
FLANK PAIN: 0
TREMORS: 0
HEADACHES: 0
CHILLS: 0
BRUISES/BLEEDS EASILY: 0
HEARTBURN: 0
COUGH: 0
NECK PAIN: 0
PALPITATIONS: 0
POLYDIPSIA: 0
SPUTUM PRODUCTION: 0
SPEECH CHANGE: 0
NERVOUS/ANXIOUS: 0

## 2020-02-15 ASSESSMENT — LIFESTYLE VARIABLES
CONSUMPTION TOTAL: NEGATIVE
HOW MANY TIMES IN THE PAST YEAR HAVE YOU HAD 5 OR MORE DRINKS IN A DAY: 0
DO YOU DRINK ALCOHOL: NO
TOTAL SCORE: 0
EVER_SMOKED: YES
EVER FELT BAD OR GUILTY ABOUT YOUR DRINKING: NO
AVERAGE NUMBER OF DAYS PER WEEK YOU HAVE A DRINK CONTAINING ALCOHOL: 0
TOTAL SCORE: 0
EVER HAD A DRINK FIRST THING IN THE MORNING TO STEADY YOUR NERVES TO GET RID OF A HANGOVER: NO
SUBSTANCE_ABUSE: 0
ON A TYPICAL DAY WHEN YOU DRINK ALCOHOL HOW MANY DRINKS DO YOU HAVE: 0
TOTAL SCORE: 0
HAVE PEOPLE ANNOYED YOU BY CRITICIZING YOUR DRINKING: NO
HAVE YOU EVER FELT YOU SHOULD CUT DOWN ON YOUR DRINKING: NO

## 2020-02-16 PROBLEM — F03.90 DEMENTIA (HCC): Status: ACTIVE | Noted: 2020-02-16

## 2020-02-16 LAB
25(OH)D3 SERPL-MCNC: 41 NG/ML (ref 30–100)
ANION GAP SERPL CALC-SCNC: 10 MMOL/L (ref 0–11.9)
BASOPHILS # BLD AUTO: 0.6 % (ref 0–1.8)
BASOPHILS # BLD: 0.04 K/UL (ref 0–0.12)
BUN SERPL-MCNC: 28 MG/DL (ref 8–22)
CALCIUM SERPL-MCNC: 9.6 MG/DL (ref 8.5–10.5)
CHLORIDE SERPL-SCNC: 108 MMOL/L (ref 96–112)
CO2 SERPL-SCNC: 26 MMOL/L (ref 20–33)
CREAT SERPL-MCNC: 0.83 MG/DL (ref 0.5–1.4)
EKG IMPRESSION: NORMAL
EOSINOPHIL # BLD AUTO: 0.12 K/UL (ref 0–0.51)
EOSINOPHIL NFR BLD: 1.7 % (ref 0–6.9)
ERYTHROCYTE [DISTWIDTH] IN BLOOD BY AUTOMATED COUNT: 49.1 FL (ref 35.9–50)
GLUCOSE SERPL-MCNC: 112 MG/DL (ref 65–99)
HCT VFR BLD AUTO: 44.6 % (ref 37–47)
HGB BLD-MCNC: 14.4 G/DL (ref 12–16)
IMM GRANULOCYTES # BLD AUTO: 0.02 K/UL (ref 0–0.11)
IMM GRANULOCYTES NFR BLD AUTO: 0.3 % (ref 0–0.9)
LYMPHOCYTES # BLD AUTO: 1.3 K/UL (ref 1–4.8)
LYMPHOCYTES NFR BLD: 18 % (ref 22–41)
MCH RBC QN AUTO: 30.4 PG (ref 27–33)
MCHC RBC AUTO-ENTMCNC: 32.3 G/DL (ref 33.6–35)
MCV RBC AUTO: 94.1 FL (ref 81.4–97.8)
MONOCYTES # BLD AUTO: 0.77 K/UL (ref 0–0.85)
MONOCYTES NFR BLD AUTO: 10.6 % (ref 0–13.4)
NEUTROPHILS # BLD AUTO: 4.99 K/UL (ref 2–7.15)
NEUTROPHILS NFR BLD: 68.8 % (ref 44–72)
NRBC # BLD AUTO: 0 K/UL
NRBC BLD-RTO: 0 /100 WBC
PLATELET # BLD AUTO: 187 K/UL (ref 164–446)
PMV BLD AUTO: 11.7 FL (ref 9–12.9)
POTASSIUM SERPL-SCNC: 4 MMOL/L (ref 3.6–5.5)
RBC # BLD AUTO: 4.74 M/UL (ref 4.2–5.4)
SODIUM SERPL-SCNC: 144 MMOL/L (ref 135–145)
TSH SERPL DL<=0.005 MIU/L-ACNC: 2.24 UIU/ML (ref 0.38–5.33)
VIT B12 SERPL-MCNC: 251 PG/ML (ref 211–911)
WBC # BLD AUTO: 7.2 K/UL (ref 4.8–10.8)

## 2020-02-16 PROCEDURE — 93005 ELECTROCARDIOGRAM TRACING: CPT | Performed by: NURSE PRACTITIONER

## 2020-02-16 PROCEDURE — 99226 PR SUBSEQUENT OBSERVATION CARE,LEVEL III: CPT | Performed by: HOSPITALIST

## 2020-02-16 PROCEDURE — 82306 VITAMIN D 25 HYDROXY: CPT

## 2020-02-16 PROCEDURE — 700101 HCHG RX REV CODE 250: Performed by: INTERNAL MEDICINE

## 2020-02-16 PROCEDURE — 80048 BASIC METABOLIC PNL TOTAL CA: CPT

## 2020-02-16 PROCEDURE — A9270 NON-COVERED ITEM OR SERVICE: HCPCS | Performed by: INTERNAL MEDICINE

## 2020-02-16 PROCEDURE — 82607 VITAMIN B-12: CPT

## 2020-02-16 PROCEDURE — 97161 PT EVAL LOW COMPLEX 20 MIN: CPT

## 2020-02-16 PROCEDURE — 700102 HCHG RX REV CODE 250 W/ 637 OVERRIDE(OP): Performed by: INTERNAL MEDICINE

## 2020-02-16 PROCEDURE — 85025 COMPLETE CBC W/AUTO DIFF WBC: CPT

## 2020-02-16 PROCEDURE — 93010 ELECTROCARDIOGRAM REPORT: CPT | Performed by: INTERNAL MEDICINE

## 2020-02-16 PROCEDURE — 84443 ASSAY THYROID STIM HORMONE: CPT

## 2020-02-16 PROCEDURE — 96374 THER/PROPH/DIAG INJ IV PUSH: CPT

## 2020-02-16 PROCEDURE — 97166 OT EVAL MOD COMPLEX 45 MIN: CPT

## 2020-02-16 PROCEDURE — G0378 HOSPITAL OBSERVATION PER HR: HCPCS

## 2020-02-16 RX ORDER — HALOPERIDOL 5 MG/ML
1 INJECTION INTRAMUSCULAR EVERY 4 HOURS PRN
Status: DISCONTINUED | OUTPATIENT
Start: 2020-02-16 | End: 2020-02-18

## 2020-02-16 RX ADMIN — LATANOPROST 1 DROP: 50 SOLUTION OPHTHALMIC at 21:55

## 2020-02-16 RX ADMIN — LABETALOL HYDROCHLORIDE 10 MG: 5 INJECTION INTRAVENOUS at 17:08

## 2020-02-16 RX ADMIN — BRIMONIDINE TARTRATE 1 DROP: 2 SOLUTION OPHTHALMIC at 17:57

## 2020-02-16 RX ADMIN — ACETAMINOPHEN 650 MG: 325 TABLET, FILM COATED ORAL at 17:55

## 2020-02-16 RX ADMIN — BRIMONIDINE TARTRATE 1 DROP: 2 SOLUTION OPHTHALMIC at 05:11

## 2020-02-16 ASSESSMENT — COGNITIVE AND FUNCTIONAL STATUS - GENERAL
PERSONAL GROOMING: A LITTLE
MOVING FROM LYING ON BACK TO SITTING ON SIDE OF FLAT BED: UNABLE
DRESSING REGULAR UPPER BODY CLOTHING: A LOT
DAILY ACTIVITIY SCORE: 14
STANDING UP FROM CHAIR USING ARMS: A LITTLE
WALKING IN HOSPITAL ROOM: A LITTLE
MOVING TO AND FROM BED TO CHAIR: UNABLE
DRESSING REGULAR LOWER BODY CLOTHING: A LOT
MOBILITY SCORE: 12
TURNING FROM BACK TO SIDE WHILE IN FLAT BAD: A LOT
SUGGESTED CMS G CODE MODIFIER DAILY ACTIVITY: CK
EATING MEALS: A LITTLE
CLIMB 3 TO 5 STEPS WITH RAILING: A LOT
SUGGESTED CMS G CODE MODIFIER MOBILITY: CL
HELP NEEDED FOR BATHING: A LOT
TOILETING: A LOT

## 2020-02-16 ASSESSMENT — GAIT ASSESSMENTS
GAIT LEVEL OF ASSIST: MINIMAL ASSIST
DISTANCE (FEET): 20
ASSISTIVE DEVICE: FRONT WHEEL WALKER

## 2020-02-16 ASSESSMENT — ACTIVITIES OF DAILY LIVING (ADL): TOILETING: UNABLE TO DETERMINE AT THIS TIME

## 2020-02-16 NOTE — ED NOTES
Med Rec Updated and Complete per Pt's MAR from [facility].  Allergies Reviewed  No PO ABX last 14 days of MAR.

## 2020-02-16 NOTE — PROGRESS NOTES
[facility] Medicine Daily Progress Note    Date of Service  2/16/2020    Chief Complaint  79 y.o. female admitted 2/15/2020 with ground-level fall.    Hospital Course    Patient is a 79-year-old female with known past medical history of hypertension, dyslipidemia, osteoporosis, breast cancer in remission and dementia.  Patient presented after a ground-level fall from assisted living facility.  Patient normally walks with a walker and has had chronic issues with coordination.  On day of admission patient got tripped with her walker and fell on the left side of her chest.  Patient was complaining of pain that was worse with deep inspiration and was transferred to the emergency room for further evaluation.  In the emergency room chest x-ray was obtained which was negative for rib fracture however patient does remain tender on the left side.  Assisted-living is worried and would like patient evaluated for failure to thrive patient to be admitted to CDU for close observation and PT/OT therapy evaluation.  Patient was also noted to have hypertension uncontrolled.      Interval Problem Update  2/16- Patient resting in bed, experiencing intermittent confusion. Awaiting PT/OT evaluations. Patient may require more help than assisted living is able to provide and will place order for skilled nursing facility to help with strength prior to returning.  Hypertension has resolved and is controlled at this time.    Consultants/Specialty  None     Code Status  DNR- POLST    Disposition  Referral placed for snf    Review of Systems  Review of Systems   Unable to perform ROS: Dementia        Physical Exam  Temp:  [36.2 °C (97.1 °F)-36.6 °C (97.9 °F)] 36.6 °C (97.9 °F)  Pulse:  [60-78] 67  Resp:  [16-18] 18  BP: (101-191)/(55-98) 114/58  SpO2:  [91 %-99 %] 99 %    Physical Exam  Vitals signs and nursing note reviewed.   Constitutional:       General: She is awake.      Appearance: She is normal weight. She is not ill-appearing.   HENT:       Head: Normocephalic.      Mouth/Throat:      Lips: Pink.   Eyes:      General: Lids are normal.      Conjunctiva/sclera: Conjunctivae normal.      Comments: Noted redness surrounding eyes    Cardiovascular:      Rate and Rhythm: Normal rate and regular rhythm.      Heart sounds: No friction rub.   Pulmonary:      Effort: Pulmonary effort is normal.      Breath sounds: Normal breath sounds. No decreased breath sounds.   Chest:      Chest wall: Tenderness present.   Abdominal:      General: Bowel sounds are normal.      Palpations: Abdomen is soft.      Tenderness: There is no abdominal tenderness.   Musculoskeletal:      Comments: NARANJO    Skin:     General: Skin is warm and dry.   Neurological:      Mental Status: She is alert. She is confused.      Comments: Patient not oriented    Psychiatric:         Attention and Perception: Attention normal.         Mood and Affect: Mood normal.         Behavior: Behavior is cooperative.         Thought Content: Thought content is paranoid.         Cognition and Memory: Cognition is impaired. Memory is impaired. She exhibits impaired recent memory.         Judgment: Judgment is impulsive and inappropriate.         Fluids  No intake or output data in the 24 hours ending 02/16/20 1506    Laboratory  Recent Labs     02/15/20  1700 02/16/20  0522   WBC 11.4* 7.2   RBC 4.92 4.74   HEMOGLOBIN 15.0 14.4   HEMATOCRIT 46.5 44.6   MCV 94.5 94.1   MCH 30.5 30.4   MCHC 32.3* 32.3*   RDW 49.1 49.1   PLATELETCT 196 187   MPV 12.0 11.7     Recent Labs     02/15/20  1700 02/16/20  0522   SODIUM 142 144   POTASSIUM 4.1 4.0   CHLORIDE 107 108   CO2 26 26   GLUCOSE 82 112*   BUN 27* 28*   CREATININE 1.02 0.83   CALCIUM 9.4 9.6                   Imaging  [id number] (WITH 1-VIEW CXR) LEFT   Final Result      No radiographic evidence of acute bony abnormality      Stable large volumes, cardiac silhouette enlargement and evidence of underlying scarring      Borderline enlarged right hilus.  This could be from vascular structures or adenopathy           Assessment/Plan  HTN (hypertension), benign- (present on admission)  Assessment & Plan  Controlled   Continue amlodipine  Monitor blood pressure  Labetalol IV as needed    Dementia (HCC)  Assessment & Plan  Patient experiencing intermittent confusion, likely related to sundowning  EKG to be obtained - then haldol to help with agitation PRN   Attempt to orient as able   Bed alarm     Debility  Assessment & Plan  Patient will be evaluated by PT OT for appropriateness of her current living situation at assisted living.  Fall precaution    Leukocytosis- (present on admission)  Assessment & Plan  Resolved       Fall- (present on admission)  Assessment & Plan  PT OT evaluation  We will check vitamin D, B12, TSH       VTE prophylaxis: SCD

## 2020-02-16 NOTE — ED PROVIDER NOTES
"ED Provider Note    CHIEF COMPLAINT  Chief Complaint   Patient presents with   • Fall       HPI  Hilda Blake is a 79 y.o. female who presents after a fall.  She states that she has been unsteady for some time.  Typically uses a walker.  Today her walker got caught up and she ended up tripping over it.  Initially she tells me that she heard nothing aside from her \"dignity.\"  But then tells me she is having quite a bit of pain in her left chest.  She hurt this when she fell.  She denies any headache or head injury.  No neck pain.  No shortness of breath.  No belly pain.  Her hip was hurting her as well but that is now feeling better.  She is able to move without difficulty.  There is no nausea vomiting no weakness or numbness.  No change in bowel or bladder.  She resides in assisted living.  After she fell she was able to crawl to the threshold of the apartment and eventually get some help.    PAST MEDICAL HISTORY  Past Medical History:   Diagnosis Date   • Breast cancer (HCC)     treated with radiation and resection   • Glaucoma - Dr. Lima 2010   • Hemangioma of liver    • Hemangioma of liver benign 2013   • HTN (hypertension), benign    • Hyperlipidemia    • Hypertension    • Osteopenia    • Ptosis of eyelid     right   • Tobacco abuse     chronic       FAMILY HISTORY  Family History   Problem Relation Age of Onset   • Autoimmune Disease Son         Sarcoidosis   • Cancer Son 48        colon    • No Known Problems Son    • No Known Problems Son    • No Known Problems Daughter    • Diabetes Neg Hx    • Hypertension Neg Hx    • Heart Disease Neg Hx        SOCIAL HISTORY  Social History     Tobacco Use   • Smoking status: Former Smoker     Packs/day: 0.50     Years: 61.00     Pack years: 30.50     Types: Cigarettes     Start date: 1956     Last attempt to quit: 2018     Years since quittin.6   • Smokeless tobacco: Never Used   • Tobacco comment: cessation recommended   Substance Use " "Topics   • Alcohol use: Yes     Alcohol/week: 0.5 oz     Types: 1 Shots of liquor per week     Comment: > 1 per week   • Drug use: No         SURGICAL HISTORY  Past Surgical History:   Procedure Laterality Date   • HIP HEMIARTHROPLASTY Right 1/12/2019    Procedure: HIP HEMIARTHROPLASTY;  Surgeon: Kirill Sanchez M.D.;  Location: SURGERY [facility];  Service: Orthopedics   • CHOLECYSTECTOMY     • CHOLECYSTECTOMY     • MASTECTOMY BILATERAL SUBQ     • MASTECTOMY BILATERAL SUBQ         CURRENT MEDICATIONS    I have reviewed the nurses notes and/or the list brought with the patient.    ALLERGIES  Allergies   Allergen Reactions   • Tamoxifen Anaphylaxis     bleeding       REVIEW OF SYSTEMS  See HPI for further details. Review of systems as above, otherwise all other systems are negative.     PHYSICAL EXAM  VITAL SIGNS: BP (!) 191/98   Pulse 70   Temp 36.6 °C (97.8 °F)   Resp 16   Ht 1.626 m (5' 4\")   Wt 53.5 kg (118 lb)   SpO2 98%   BMI 20.25 kg/m²     Constitutional: Well appearing patient in no acute distress.  Not toxic, nor ill in appearance.  HENT: Mucus membranes moist.  Oropharynx is clear.  The head is atraumatic.  Eyes: Pupils equally round.  No scleral icterus.   Neck: Full nontender range of motion.  Cardiovascular: Regular heart rate and rhythm.  No murmurs, rubs, nor gallop appreciated.   Thorax & Lungs: Chest is tender over the left chest wall.  No obvious deformities.  Lungs are clear to auscultation with good air movement bilaterally.  No wheeze, rhonchi, nor rales.   Abdomen: Soft, with no tenderness, rebound nor guarding.  No mass, pulsatile mass, nor hepatosplenomegaly appreciated.  Skin: No purpura nor petechia noted.  Extremities/Musculoskeletal: No sign of trauma.  Good range of motion passively of that left hip.  Calves are nontender with no cords nor edema.  No Carlos's sign.  Pulses are intact all around.   Neurologic: Alert & oriented.  Strength and sensation is intact all around.  " Gait is extremely unsteady according to nursing with simply transferring to bedside commode.  Psychiatric: Normal affect appropriate for the clinical situation.      LABS  Labs Reviewed   CBC WITH DIFFERENTIAL - Abnormal; Notable for the following components:       Result Value    WBC 11.4 (*)     MCHC 32.3 (*)     Neutrophils-Polys 77.30 (*)     Lymphocytes 11.60 (*)     Neutrophils (Absolute) 8.84 (*)     Monos (Absolute) 1.06 (*)     All other components within normal limits   COMP METABOLIC PANEL - Abnormal; Notable for the following components:    Bun 27 (*)     All other components within normal limits   URINALYSIS,CULTURE IF INDICATED - Abnormal; Notable for the following components:    Ketones Trace (*)     Leukocyte Esterase Trace (*)     All other components within normal limits   ESTIMATED GFR - Abnormal; Notable for the following components:    GFR If Non  52 (*)     All other components within normal limits   URINE MICROSCOPIC (W/UA) - Abnormal; Notable for the following components:    RBC 2-5 (*)     All other components within normal limits         RADIOLOGY/PROCEDURES  I have reviewed the patient's film interpretations myself, and they are read out by the radiologist as:   [id number] (WITH 1-VIEW CXR) LEFT   Final Result      No radiographic evidence of acute bony abnormality      Stable large volumes, cardiac silhouette enlargement and evidence of underlying scarring      Borderline enlarged right hilus. This could be from vascular structures or adenopathy        .    MEDICAL RECORD  I have reviewed patient's medical record and pertinent results are listed above.    COURSE & MEDICAL DECISION MAKING  I have reviewed any medical record information, laboratory studies and radiographic results as noted above.  This patient presents after a fall after getting caught up with her walker.  It sounds like she has baseline poor ambulation.  It is not all clear to me with things are  worse today but certainly the nursing staff is extremely concerned about her ability to even transfer let alone walk.  Certainly, the patient did injure her left chest.  I see no obvious rib fracture.  There is no pneumothorax or hemothorax.  Her abdomen is benign, do not suspect splenic or other solid organ injury.  This chest injury is likely contributing to her instability specifically with her ability to use a walker.  At this point do not think that she is safe for discharge.  The patient tends to agree with this.  [facility].    FINAL IMPRESSION  1. Chest wall pain    2. Unable to ambulate    3. Fall, initial encounter           This dictation was created using voice recognition software.    Electronically signed by: William Weeks M.D., 2/15/2020 6:25 PM

## 2020-02-16 NOTE — PROGRESS NOTES
Admit from ED via dustin,A/o,assessment completed,poc discussed,verbalized understanding,bedside swallow completed, box meal given,  Neha care done, 2 RN skin check completed,intact, some redness  On her left ear,bed alarm on for safety,call button within reach,will continue to monitor.

## 2020-02-16 NOTE — ASSESSMENT & PLAN NOTE
Patient will be evaluated by PT OT for appropriateness of her current living situation at assisted living.  Fall precaution

## 2020-02-16 NOTE — PROGRESS NOTES
Pt naked, trying to get OOB without assistance. Assisted back to bed. Pt frustrated that has not been seen by PT/OT yet. OT paged, awaiting call back

## 2020-02-16 NOTE — H&P
Hospital Medicine History & Physical Note    Date of Service  2/15/2020    Primary Care Physician  Mayo Oswald M.D.    Consultants  None    Code Status  DNR/DNI per POLST form    Chief Complaint  Ground-level fall    History of Presenting Illness  79 y.o. female with past medical history of hypertension, dyslipidemia, osteoporosis, breast cancer in remission, who presented 2/15/2020 from assisted living facility after ground-level fall.  She walks with a walker.  She has chronic issue with coordination. Today her walker got caught up and she tripped over her walker she fell and hit left side of the chest.  She denies headache or head injury.  She currently has some dull discomfort in the chest, worsening with deep inspiration.  X-ray negative for rib fracture.  She had difficulties getting up out of the floor and had to crawl to her apartment.  She is being admitted with concern for failure to thrive    Review of Systems  Review of Systems   Constitutional: Negative for chills, fever and weight loss.   HENT: Negative for ear pain, hearing loss and tinnitus.    Eyes: Negative for blurred vision, double vision and photophobia.   Respiratory: Negative for cough, hemoptysis and sputum production.    Cardiovascular: Negative for chest pain, palpitations and orthopnea.   Gastrointestinal: Negative for heartburn, nausea and vomiting.   Genitourinary: Negative for dysuria, flank pain, frequency and hematuria.   Musculoskeletal: Positive for joint pain. Negative for back pain and neck pain.   Skin: Negative for itching and rash.   Neurological: Negative for tremors, speech change, focal weakness and headaches.   Endo/Heme/Allergies: Negative for environmental allergies and polydipsia. Does not bruise/bleed easily.   Psychiatric/Behavioral: Negative for hallucinations and substance abuse. The patient is not nervous/anxious.        Past Medical History   has a past medical history of Breast cancer (HCC) (2004), Glaucoma -  Dr. Lima (4/20/2010), Hemangioma of liver, Hemangioma of liver benign (8/19/2013), HTN (hypertension), benign, Hyperlipidemia, Hypertension, Osteopenia, Ptosis of eyelid, and Tobacco abuse.    Surgical History   has a past surgical history that includes mastectomy bilateral subq; cholecystectomy; mastectomy bilateral subq; cholecystectomy; and hip hemiarthroplasty (Right, 1/12/2019).     Family History  Autoimmune Disease in her son; Cancer (age of onset: 48) in her son; No Known Problems in her daughter, son, and son.     Social History  Patient denies smoking cigarettes.  Reports that she quit smoking about 19 months ago. Her smoking use included cigarettes. She started smoking about 63 years ago. She has a 30.50 pack-year smoking history. She has never used smokeless tobacco. She reports current alcohol use of about 0.5 oz of alcohol per week. She reports that she does not use drugs.    Allergies  Allergies   Allergen Reactions   • Tamoxifen Anaphylaxis     bleeding       Medications  Prior to Admission Medications   Prescriptions Last Dose Informant Patient Reported? Taking?   Brimonidine Tartrate-Timolol (COMBIGAN) 0.2-0.5 % Solution  Patient Yes No   Sig: Place 1 Drop in both eyes 2 Times a Day.   amLODIPine (NORVASC) 5 MG Tab  Patient Yes No   Sig: Take 5 mg by mouth every day.   guaiFENesin dextromethorphan (ROBITUSSIN DM) 100-10 MG/5ML Syrup syrup   No No   Sig: Take 10 mL by mouth every 6 hours as needed for Cough.   latanoprost (XALATAN) 0.005 % Solution  Patient Yes No   Sig: Place 1 Drop in left eye every evening.   levETIRAcetam (KEPPRA) 250 MG tablet  Patient Yes No   Sig: Take 250 mg by mouth every day.   metoprolol (LOPRESSOR) 25 MG Tab  Patient Yes No   Sig: Take 25 mg by mouth 2 times a day.   oxyCODONE immediate-release (ROXICODONE) 5 MG Tab  Patient Yes No   Sig: Take 5 mg by mouth 2 times a day as needed for Severe Pain.      Facility-Administered Medications: None       Physical  Exam  Temp:  [36.6 °C (97.8 °F)] 36.6 °C (97.8 °F)  Pulse:  [70] 70  Resp:  [16] 16  BP: (191)/(98) 191/98  SpO2:  [98 %] 98 %    Physical Exam  Vitals signs and nursing note reviewed.   Constitutional:       General: She is not in acute distress.     Appearance: Normal appearance.   HENT:      Head: Normocephalic and atraumatic.      Nose: Nose normal.      Mouth/Throat:      Mouth: Mucous membranes are moist.   Eyes:      Extraocular Movements: Extraocular movements intact.      Pupils: Pupils are equal, round, and reactive to light.   Neck:      Musculoskeletal: Normal range of motion and neck supple.   Cardiovascular:      Rate and Rhythm: Normal rate and regular rhythm.   Pulmonary:      Effort: Pulmonary effort is normal.      Breath sounds: Normal breath sounds.   Chest:      Comments: Tenderness to palpation over left anterior chest wall  Abdominal:      General: Abdomen is flat. There is no distension.      Tenderness: There is no abdominal tenderness.   Musculoskeletal: Normal range of motion.         General: No swelling or deformity.   Skin:     General: Skin is warm and dry.   Neurological:      General: No focal deficit present.      Mental Status: She is alert and oriented to person, place, and time.      Coordination: Coordination abnormal.      Gait: Gait abnormal.   Psychiatric:         Mood and Affect: Mood normal.         Behavior: Behavior normal.         Laboratory:  Recent Labs     02/15/20  1700   WBC 11.4*   RBC 4.92   HEMOGLOBIN 15.0   HEMATOCRIT 46.5   MCV 94.5   MCH 30.5   MCHC 32.3*   RDW 49.1   PLATELETCT 196   MPV 12.0     Recent Labs     02/15/20  1700   SODIUM 142   POTASSIUM 4.1   CHLORIDE 107   CO2 26   GLUCOSE 82   BUN 27*   CREATININE 1.02   CALCIUM 9.4     Recent Labs     02/15/20  1700   ALTSGPT 11   ASTSGOT 16   ALKPHOSPHAT 83   TBILIRUBIN 1.0   GLUCOSE 82         No results for input(s): NTPROBNP in the last 72 hours.      No results for input(s): TROPONINT in the last 72  hours.    Urinalysis:    No results found     Imaging:  [id number] (WITH 1-VIEW CXR) LEFT   Final Result      No radiographic evidence of acute bony abnormality      Stable large volumes, cardiac silhouette enlargement and evidence of underlying scarring      Borderline enlarged right hilus. This could be from vascular structures or adenopathy            Assessment/Plan:  I anticipate this patient is appropriate for observation status at this time.    HTN (hypertension), benign- (present on admission)  Assessment & Plan  Uncontrolled  Patient is not on medication scheduled.  Will start on amlodipine  Monitor blood pressure  Labetalol IV as needed    Debility  Assessment & Plan  Patient will be evaluated by PT OT for appropriateness of her current living situation at assisted living.  Fall precaution    Leukocytosis- (present on admission)  Assessment & Plan  Mild.  No localizing signs.  Monitor for fever    Fall- (present on admission)  Assessment & Plan  PT OT evaluation  We will check vitamin D, B12, TSH      VTE prophylaxis: Heparin

## 2020-02-16 NOTE — ED NOTES
Report called to Montse CARUSO. All questions answered. All belongings with pt. Pt transported via gurney to floor.

## 2020-02-16 NOTE — ASSESSMENT & PLAN NOTE
Patient experiencing intermittent confusion, likely related to sundowning  EKG to be obtained - then haldol to help with agitation PRN   Attempt to orient as able   Bed alarm

## 2020-02-16 NOTE — PROGRESS NOTES
Assumed pt care at 0700. Received report from Montse CARUSO. A&O x4. Pt denies pain at this time, but reports that pain is exacerbated by movement. Respirations even and unlabored on 2L n/c.   Updated on POC, communication board updated. Bed locked and in lowest position. Call light and belongings within reach. Non-skid socks in place. Needs met, will continue to monitor.

## 2020-02-17 ENCOUNTER — APPOINTMENT (OUTPATIENT)
Dept: RADIOLOGY | Facility: MEDICAL CENTER | Age: 80
End: 2020-02-17
Attending: INTERNAL MEDICINE
Payer: MEDICARE

## 2020-02-17 LAB
ANION GAP SERPL CALC-SCNC: 13 MMOL/L (ref 0–11.9)
BASOPHILS # BLD AUTO: 0.3 % (ref 0–1.8)
BASOPHILS # BLD: 0.03 K/UL (ref 0–0.12)
BUN SERPL-MCNC: 23 MG/DL (ref 8–22)
CALCIUM SERPL-MCNC: 9.4 MG/DL (ref 8.5–10.5)
CHLORIDE SERPL-SCNC: 104 MMOL/L (ref 96–112)
CO2 SERPL-SCNC: 23 MMOL/L (ref 20–33)
CREAT SERPL-MCNC: 0.91 MG/DL (ref 0.5–1.4)
EOSINOPHIL # BLD AUTO: 0 K/UL (ref 0–0.51)
EOSINOPHIL NFR BLD: 0 % (ref 0–6.9)
ERYTHROCYTE [DISTWIDTH] IN BLOOD BY AUTOMATED COUNT: 48.6 FL (ref 35.9–50)
FLUAV RNA SPEC QL NAA+PROBE: POSITIVE
FLUBV RNA SPEC QL NAA+PROBE: NEGATIVE
GLUCOSE SERPL-MCNC: 129 MG/DL (ref 65–99)
HCT VFR BLD AUTO: 42.7 % (ref 37–47)
HGB BLD-MCNC: 14 G/DL (ref 12–16)
IMM GRANULOCYTES # BLD AUTO: 0.02 K/UL (ref 0–0.11)
IMM GRANULOCYTES NFR BLD AUTO: 0.2 % (ref 0–0.9)
INR PPP: 1.07 (ref 0.87–1.13)
LACTATE BLD-SCNC: 1.3 MMOL/L (ref 0.5–2)
LYMPHOCYTES # BLD AUTO: 0.49 K/UL (ref 1–4.8)
LYMPHOCYTES NFR BLD: 5.4 % (ref 22–41)
MCH RBC QN AUTO: 30.2 PG (ref 27–33)
MCHC RBC AUTO-ENTMCNC: 32.8 G/DL (ref 33.6–35)
MCV RBC AUTO: 92.2 FL (ref 81.4–97.8)
MONOCYTES # BLD AUTO: 1.04 K/UL (ref 0–0.85)
MONOCYTES NFR BLD AUTO: 11.4 % (ref 0–13.4)
NEUTROPHILS # BLD AUTO: 7.54 K/UL (ref 2–7.15)
NEUTROPHILS NFR BLD: 82.7 % (ref 44–72)
NRBC # BLD AUTO: 0 K/UL
NRBC BLD-RTO: 0 /100 WBC
PLATELET # BLD AUTO: 160 K/UL (ref 164–446)
PMV BLD AUTO: 11.9 FL (ref 9–12.9)
POTASSIUM SERPL-SCNC: 3.8 MMOL/L (ref 3.6–5.5)
PROCALCITONIN SERPL-MCNC: 0.3 NG/ML
PROTHROMBIN TIME: 14.2 SEC (ref 12–14.6)
RBC # BLD AUTO: 4.63 M/UL (ref 4.2–5.4)
SODIUM SERPL-SCNC: 140 MMOL/L (ref 135–145)
WBC # BLD AUTO: 9.1 K/UL (ref 4.8–10.8)

## 2020-02-17 PROCEDURE — 96372 THER/PROPH/DIAG INJ SC/IM: CPT

## 2020-02-17 PROCEDURE — 85610 PROTHROMBIN TIME: CPT

## 2020-02-17 PROCEDURE — 306396 CUSHION, WAFFLE ADULT 17X17: Performed by: HOSPITALIST

## 2020-02-17 PROCEDURE — 36415 COLL VENOUS BLD VENIPUNCTURE: CPT

## 2020-02-17 PROCEDURE — A9270 NON-COVERED ITEM OR SERVICE: HCPCS | Performed by: INTERNAL MEDICINE

## 2020-02-17 PROCEDURE — 700111 HCHG RX REV CODE 636 W/ 250 OVERRIDE (IP): Performed by: NURSE PRACTITIONER

## 2020-02-17 PROCEDURE — 80048 BASIC METABOLIC PNL TOTAL CA: CPT

## 2020-02-17 PROCEDURE — G0378 HOSPITAL OBSERVATION PER HR: HCPCS

## 2020-02-17 PROCEDURE — 85025 COMPLETE CBC W/AUTO DIFF WBC: CPT

## 2020-02-17 PROCEDURE — 87502 INFLUENZA DNA AMP PROBE: CPT

## 2020-02-17 PROCEDURE — 96375 TX/PRO/DX INJ NEW DRUG ADDON: CPT

## 2020-02-17 PROCEDURE — 71045 X-RAY EXAM CHEST 1 VIEW: CPT

## 2020-02-17 PROCEDURE — 87040 BLOOD CULTURE FOR BACTERIA: CPT | Mod: 91

## 2020-02-17 PROCEDURE — 700111 HCHG RX REV CODE 636 W/ 250 OVERRIDE (IP): Performed by: INTERNAL MEDICINE

## 2020-02-17 PROCEDURE — 99225 PR SUBSEQUENT OBSERVATION CARE,LEVEL II: CPT | Performed by: HOSPITALIST

## 2020-02-17 PROCEDURE — 83605 ASSAY OF LACTIC ACID: CPT

## 2020-02-17 PROCEDURE — 84145 PROCALCITONIN (PCT): CPT

## 2020-02-17 PROCEDURE — 700102 HCHG RX REV CODE 250 W/ 637 OVERRIDE(OP): Performed by: INTERNAL MEDICINE

## 2020-02-17 RX ORDER — IPRATROPIUM BROMIDE AND ALBUTEROL SULFATE 2.5; .5 MG/3ML; MG/3ML
3 SOLUTION RESPIRATORY (INHALATION)
Status: DISCONTINUED | OUTPATIENT
Start: 2020-02-17 | End: 2020-02-21 | Stop reason: HOSPADM

## 2020-02-17 RX ORDER — ALBUTEROL SULFATE 90 UG/1
2 AEROSOL, METERED RESPIRATORY (INHALATION)
Status: DISCONTINUED | OUTPATIENT
Start: 2020-02-17 | End: 2020-02-21 | Stop reason: HOSPADM

## 2020-02-17 RX ORDER — SODIUM CHLORIDE, SODIUM LACTATE, POTASSIUM CHLORIDE, AND CALCIUM CHLORIDE .6; .31; .03; .02 G/100ML; G/100ML; G/100ML; G/100ML
500 INJECTION, SOLUTION INTRAVENOUS
Status: DISCONTINUED | OUTPATIENT
Start: 2020-02-17 | End: 2020-02-21 | Stop reason: HOSPADM

## 2020-02-17 RX ORDER — SODIUM CHLORIDE, SODIUM LACTATE, POTASSIUM CHLORIDE, AND CALCIUM CHLORIDE .6; .31; .03; .02 G/100ML; G/100ML; G/100ML; G/100ML
30 INJECTION, SOLUTION INTRAVENOUS
Status: DISCONTINUED | OUTPATIENT
Start: 2020-02-17 | End: 2020-02-21 | Stop reason: HOSPADM

## 2020-02-17 RX ADMIN — HALOPERIDOL LACTATE 1 MG: 5 INJECTION, SOLUTION INTRAMUSCULAR at 16:45

## 2020-02-17 RX ADMIN — BRIMONIDINE TARTRATE 1 DROP: 2 SOLUTION OPHTHALMIC at 06:00

## 2020-02-17 RX ADMIN — ENOXAPARIN SODIUM 40 MG: 100 INJECTION SUBCUTANEOUS at 06:00

## 2020-02-17 RX ADMIN — AMLODIPINE BESYLATE 5 MG: 5 TABLET ORAL at 05:57

## 2020-02-17 RX ADMIN — LEVETIRACETAM 250 MG: 250 TABLET ORAL at 05:57

## 2020-02-17 RX ADMIN — LATANOPROST 1 DROP: 50 SOLUTION OPHTHALMIC at 21:24

## 2020-02-17 RX ADMIN — ACETAMINOPHEN 650 MG: 325 TABLET, FILM COATED ORAL at 21:49

## 2020-02-17 ASSESSMENT — COPD QUESTIONNAIRES
DO YOU EVER COUGH UP ANY MUCUS OR PHLEGM?: YES, A FEW DAYS A WEEK OR MONTH
COPD SCREENING SCORE: 6
DURING THE PAST 4 WEEKS HOW MUCH DID YOU FEEL SHORT OF BREATH: SOME OF THE TIME
HAVE YOU SMOKED AT LEAST 100 CIGARETTES IN YOUR ENTIRE LIFE: YES

## 2020-02-17 ASSESSMENT — LIFESTYLE VARIABLES: EVER_SMOKED: YES

## 2020-02-17 NOTE — PROGRESS NOTES
Pt arrived to unit at 1530, assumed care of pt. Pt oriented to RN, CNA, and room/ bathroom. Pot reminded to call before getting up. Pt is a high fall risk, bed alarm on, nonskid socks on, call light within reach. Pt is A&Ox1, pleasantly confused. All questions answered at this time.     Agree with previous RNs assessment except for what's charted.

## 2020-02-17 NOTE — CARE PLAN
Problem: Communication  Goal: The ability to communicate needs accurately and effectively will improve  Outcome: PROGRESSING AS EXPECTED     Problem: Safety  Goal: Will remain free from injury  Outcome: PROGRESSING AS EXPECTED  Goal: Will remain free from falls  Outcome: PROGRESSING AS EXPECTED     Call light education provide, patient completed return demonstration successfully. Re-enforced use of call light to ensure patient safety and decrease risk of fall.

## 2020-02-17 NOTE — RESPIRATORY CARE
COPD EDUCATION by COPD CLINICAL EDUCATOR  2/17/2020 at 6:25 AM by Migdalia Manriquez, RRT     Patient interviewed by COPD education team. Patient refused COPD program at this time.

## 2020-02-17 NOTE — DISCHARGE PLANNING
Received Choice form at 0600  Agency/Facility Name: [facility], [facility]  Referral sent per Choice form @ 0422

## 2020-02-17 NOTE — DISCHARGE PLANNING
note:  Received a call from daughter Alis who gave a choice for SNF. She wanted to try [facility], [facility] and [facility]. She said that pt was at [facility] before and they are not happy with [facility]. Faxed choice to [facility].     Addendum:  Fax confirmation receipt received.

## 2020-02-17 NOTE — PROGRESS NOTES
[facility] Medicine Daily Progress Note    Date of Service  2/17/2020    Chief Complaint  79 y.o. female admitted 2/15/2020 with ground-level fall.    Hospital Course    Patient is a 79-year-old female with known past medical history of hypertension, dyslipidemia, osteoporosis, breast cancer in remission and dementia.  Patient presented after a ground-level fall from assisted living facility.  Patient normally walks with a walker and has had chronic issues with coordination.  On day of admission patient got tripped with her walker and fell on the left side of her chest.  Patient was complaining of pain that was worse with deep inspiration and was transferred to the emergency room for further evaluation.  In the emergency room chest x-ray was obtained which was negative for rib fracture however patient does remain tender on the left side.  Assisted-living is worried and would like patient evaluated for failure to thrive patient to be admitted to CDU for close observation and PT/OT therapy evaluation.  Patient was also noted to have hypertension uncontrolled.      Interval Problem Update  2/16- Patient resting in bed, experiencing intermittent confusion. Awaiting PT/OT evaluations. Patient may require more help than assisted living is able to provide and will place order for skilled nursing facility to help with strength prior to returning.  Hypertension has resolved and is controlled at this time.  2/17- Patient resting in bed, remains confused, and state she is tired today. Having trouble keeping clothes on patient. Choice for SNF obtained from daughter and awaiting acceptance. Transfer order placed.     Consultants/Specialty  None     Code Status  DNR- POLST    Disposition  Referral placed for snf- awaiting acceptance     Review of Systems  Review of Systems   Unable to perform ROS: Dementia        Physical Exam  Temp:  [36.5 °C (97.7 °F)-37.4 °C (99.3 °F)] 37.4 °C (99.3 °F)  Pulse:  [] 97  Resp:  [17-20]  20  BP: (109-175)/(53-87) 114/53  SpO2:  [92 %-95 %] 93 %    Physical Exam  Vitals signs and nursing note reviewed.   Constitutional:       General: She is awake.      Appearance: She is normal weight. She is not ill-appearing.      Comments: Patient confused and not wanting to keep clothes on    HENT:      Head: Normocephalic.      Mouth/Throat:      Lips: Pink.   Eyes:      General: Lids are normal.      Conjunctiva/sclera: Conjunctivae normal.      Comments: Noted redness surrounding eyes    Cardiovascular:      Rate and Rhythm: Normal rate and regular rhythm.      Heart sounds: No friction rub.   Pulmonary:      Effort: Pulmonary effort is normal.      Breath sounds: Normal breath sounds. No decreased breath sounds.   Chest:      Chest wall: Tenderness present.   Abdominal:      General: Bowel sounds are normal.      Palpations: Abdomen is soft.      Tenderness: There is no abdominal tenderness.   Musculoskeletal:      Comments: NARANJO    Skin:     General: Skin is warm and dry.   Neurological:      Mental Status: She is alert. She is confused.      Comments: Patient not oriented    Psychiatric:         Attention and Perception: Attention normal.         Mood and Affect: Mood normal.         Behavior: Behavior is cooperative.         Thought Content: Thought content is paranoid.         Cognition and Memory: Cognition is impaired. Memory is impaired. She exhibits impaired recent memory.         Judgment: Judgment is impulsive and inappropriate.      Comments: Impaired memory and orientation          Fluids  No intake or output data in the 24 hours ending 02/17/20 1053    Laboratory  Recent Labs     02/15/20  1700 02/16/20  0522   WBC 11.4* 7.2   RBC 4.92 4.74   HEMOGLOBIN 15.0 14.4   HEMATOCRIT 46.5 44.6   MCV 94.5 94.1   MCH 30.5 30.4   MCHC 32.3* 32.3*   RDW 49.1 49.1   PLATELETCT 196 187   MPV 12.0 11.7     Recent Labs     02/15/20  1700 02/16/20  0522   SODIUM 142 144   POTASSIUM 4.1 4.0   CHLORIDE 107 108    CO2 26 26   GLUCOSE 82 112*   BUN 27* 28*   CREATININE 1.02 0.83   CALCIUM 9.4 9.6                   Imaging  [id number] (WITH 1-VIEW CXR) LEFT   Final Result      No radiographic evidence of acute bony abnormality      Stable large volumes, cardiac silhouette enlargement and evidence of underlying scarring      Borderline enlarged right hilus. This could be from vascular structures or adenopathy           Assessment/Plan  HTN (hypertension), benign- (present on admission)  Assessment & Plan  Controlled   Continue amlodipine  Monitor blood pressure  Labetalol IV as needed    Dementia (HCC)  Assessment & Plan  Patient experiencing intermittent confusion, likely related to sundowning  EKG to be obtained - then haldol to help with agitation PRN   Attempt to orient as able   Bed alarm     Debility  Assessment & Plan  Patient will be evaluated by PT OT for appropriateness of her current living situation at assisted living.  Fall precaution    Leukocytosis- (present on admission)  Assessment & Plan  Resolved       Fall- (present on admission)  Assessment & Plan  PT OT evaluation  We will check vitamin D, B12, TSH       VTE prophylaxis: SCD

## 2020-02-17 NOTE — DISCHARGE PLANNING
Unable to speak with patient, patient disoriented. Awaiting SNF acceptance and will contact daughter.

## 2020-02-17 NOTE — THERAPY
"Occupational Therapy Evaluation completed.   Functional Status:  Enoch supine>sit, MaxA LB dress, Enoch UB dress, Enoch sit>stand, ModA functional transfer, freezing steps noted during mobility w/FWW, declined standing grooming 2/2 balance deficits/fatigue  Plan of Care: Will benefit from Occupational Therapy 3 times per week  Discharge Recommendations:  Equipment: Will Continue to Assess for Equipment Needs. Post-acute therapy Recommend post-acute placement for additional occupational therapy services prior to discharge home.    See \"Rehab Therapy-Acute\" Patient Summary Report for complete documentation.    Pt is 80yo female admitted following GLF at home, which per RN is at an [facility] however pt was unable to confirm or provide name of assisted living residence. Pt unreliable historian, A&Ox1. Pt perseverated on something happening at a \"bar that was going to shut down\", pt knew it was February but reported 2016, and said \"we are near the bar\" when asked location orientation. Pt presents to OT eval requiring assist for all ADLs and ADL transfers, functional mobility limited by very narrow steps and intermittent freezing, appeared similar to a parkinsonian stepping pattern. Pt will require post-acute placement for additional therapy prior to DC home. Confirmation of level of assist provided by SERAFIN would be helpful to determine safety of eventual DC plan following post-acute placement.   "

## 2020-02-17 NOTE — PROGRESS NOTES
Bed alarm rang. Patient found trying to crawl out of bed. Patient re-oriented and placed on bedside commode per request.

## 2020-02-17 NOTE — THERAPY
"Physical Therapy Evaluation completed.   Bed Mobility:  Supine to Sit: Minimal Assist  Transfers: Sit to Stand: Minimal Assist  Gait: Level Of Assist: Minimal Assist with Front-Wheel Walker       Plan of Care: Will benefit from Physical Therapy 3 times per week  Discharge Recommendations: Equipment: Will Continue to Assess for Equipment Needs. Recommend post-acute placement for continued physical therapy services prior to discharge home.       See \"Rehab Therapy-Acute\" Patient Summary Report for complete documentation.     Pt is a 80yo female presenting to acute following GLF. Pt seen for PT evaluation. Pt may be unreliable historian and presents with intermittent confusion. Pt reporting she is \"at the bar\" and that it is \"February 2016.\" Pt reports previously independent in all mobility and was using the stairs at her [facility] from ground floor to second floor. Pt required min A for bed mobility and STS. Ambulated 20ft with FWW and min A for safety and balance. Pt with almost Parkinsonian-like gait; shuffled steps, episodes of freezing, and poor initiation. Pt also with poor weight shifting and posterior lean with episodes of posterior LOB during gait. At this time pt is unsafe to return home alone and is at risk for falls. Recommend postacute placement for continued therapy prior to return home alone. Will follow.   "

## 2020-02-17 NOTE — PROGRESS NOTES
Respirations even and unlabored, equal chest rise and fall noted. Patient easily awaken for assessment. Denies pain at this time. Monitors applied, call light within reach, plan of care updated and education board updated.

## 2020-02-17 NOTE — PROGRESS NOTES
· 2 RN skin check complete selam Wilder RN.   · Pt transferred from CDU  · Devices in place oxygen.  · Skin assessed under devices yes.  · Confirmed pressure ulcers found on yes.  · New potential pressure ulcers noted on NA. Wound consult placed? NA. Photo uploaded? NA.   · The following interventions are in place pt is q2h turns, pressure redistribution mattress, grey foam pieces on silicone nasal cannula.

## 2020-02-17 NOTE — PROGRESS NOTES
A/o,assessment completed,poc discussed,verbalized understanding,calm and cooperative, turkey sandwich given per pt request, tolerating po well,denies pain,n/v,turned repositioned q 2 hrs, call button within reach,will continue to monitor.

## 2020-02-17 NOTE — PROGRESS NOTES
Transferred patient to [place] [id number] via hospital bed with RN. Medications, chart, and belongings with patient. Report given to receiving RN, all questions answered.

## 2020-02-18 PROBLEM — J10.1 INFLUENZA A: Status: ACTIVE | Noted: 2020-02-18

## 2020-02-18 LAB
ANION GAP SERPL CALC-SCNC: 10 MMOL/L (ref 0–11.9)
APPEARANCE UR: ABNORMAL
BASOPHILS # BLD AUTO: 0.2 % (ref 0–1.8)
BASOPHILS # BLD: 0.02 K/UL (ref 0–0.12)
BILIRUB UR QL STRIP.AUTO: NEGATIVE
BUN SERPL-MCNC: 26 MG/DL (ref 8–22)
CALCIUM SERPL-MCNC: 9.5 MG/DL (ref 8.5–10.5)
CHLORIDE SERPL-SCNC: 105 MMOL/L (ref 96–112)
CO2 SERPL-SCNC: 25 MMOL/L (ref 20–33)
COLOR UR: ABNORMAL
CREAT SERPL-MCNC: 1.14 MG/DL (ref 0.5–1.4)
EKG IMPRESSION: NORMAL
EOSINOPHIL # BLD AUTO: 0 K/UL (ref 0–0.51)
EOSINOPHIL NFR BLD: 0 % (ref 0–6.9)
EPI CELLS #/AREA URNS HPF: ABNORMAL /HPF
ERYTHROCYTE [DISTWIDTH] IN BLOOD BY AUTOMATED COUNT: 49.2 FL (ref 35.9–50)
GLUCOSE SERPL-MCNC: 140 MG/DL (ref 65–99)
GLUCOSE UR STRIP.AUTO-MCNC: NEGATIVE MG/DL
HCT VFR BLD AUTO: 43.5 % (ref 37–47)
HGB BLD-MCNC: 13.8 G/DL (ref 12–16)
HYALINE CASTS #/AREA URNS LPF: >20 /LPF
IMM GRANULOCYTES # BLD AUTO: 0.03 K/UL (ref 0–0.11)
IMM GRANULOCYTES NFR BLD AUTO: 0.4 % (ref 0–0.9)
KETONES UR STRIP.AUTO-MCNC: ABNORMAL MG/DL
LACTATE BLD-SCNC: 0.9 MMOL/L (ref 0.5–2)
LACTATE BLD-SCNC: 1.4 MMOL/L (ref 0.5–2)
LACTATE BLD-SCNC: 1.6 MMOL/L (ref 0.5–2)
LEUKOCYTE ESTERASE UR QL STRIP.AUTO: ABNORMAL
LYMPHOCYTES # BLD AUTO: 0.54 K/UL (ref 1–4.8)
LYMPHOCYTES NFR BLD: 6.6 % (ref 22–41)
MCH RBC QN AUTO: 29.8 PG (ref 27–33)
MCHC RBC AUTO-ENTMCNC: 31.7 G/DL (ref 33.6–35)
MCV RBC AUTO: 94 FL (ref 81.4–97.8)
MICRO URNS: ABNORMAL
MONOCYTES # BLD AUTO: 1.06 K/UL (ref 0–0.85)
MONOCYTES NFR BLD AUTO: 12.9 % (ref 0–13.4)
NEUTROPHILS # BLD AUTO: 6.56 K/UL (ref 2–7.15)
NEUTROPHILS NFR BLD: 79.9 % (ref 44–72)
NITRITE UR QL STRIP.AUTO: NEGATIVE
NRBC # BLD AUTO: 0 K/UL
NRBC BLD-RTO: 0 /100 WBC
PH UR STRIP.AUTO: 5 [PH] (ref 5–8)
PLATELET # BLD AUTO: 153 K/UL (ref 164–446)
PMV BLD AUTO: 11.6 FL (ref 9–12.9)
POTASSIUM SERPL-SCNC: 3.8 MMOL/L (ref 3.6–5.5)
PROT UR QL STRIP: 100 MG/DL
RBC # BLD AUTO: 4.63 M/UL (ref 4.2–5.4)
RBC # URNS HPF: ABNORMAL /HPF
RBC UR QL AUTO: ABNORMAL
SODIUM SERPL-SCNC: 140 MMOL/L (ref 135–145)
SP GR UR STRIP.AUTO: 1.02
UROBILINOGEN UR STRIP.AUTO-MCNC: 1 MG/DL
WBC # BLD AUTO: 8.2 K/UL (ref 4.8–10.8)
WBC #/AREA URNS HPF: ABNORMAL /HPF

## 2020-02-18 PROCEDURE — 700111 HCHG RX REV CODE 636 W/ 250 OVERRIDE (IP): Performed by: INTERNAL MEDICINE

## 2020-02-18 PROCEDURE — 36415 COLL VENOUS BLD VENIPUNCTURE: CPT

## 2020-02-18 PROCEDURE — 700102 HCHG RX REV CODE 250 W/ 637 OVERRIDE(OP): Performed by: HOSPITALIST

## 2020-02-18 PROCEDURE — 96372 THER/PROPH/DIAG INJ SC/IM: CPT

## 2020-02-18 PROCEDURE — 99226 PR SUBSEQUENT OBSERVATION CARE,LEVEL III: CPT | Performed by: HOSPITALIST

## 2020-02-18 PROCEDURE — 85025 COMPLETE CBC W/AUTO DIFF WBC: CPT

## 2020-02-18 PROCEDURE — 93005 ELECTROCARDIOGRAM TRACING: CPT | Performed by: HOSPITALIST

## 2020-02-18 PROCEDURE — 97530 THERAPEUTIC ACTIVITIES: CPT

## 2020-02-18 PROCEDURE — 80048 BASIC METABOLIC PNL TOTAL CA: CPT

## 2020-02-18 PROCEDURE — 83605 ASSAY OF LACTIC ACID: CPT

## 2020-02-18 PROCEDURE — A9270 NON-COVERED ITEM OR SERVICE: HCPCS | Performed by: INTERNAL MEDICINE

## 2020-02-18 PROCEDURE — 97116 GAIT TRAINING THERAPY: CPT

## 2020-02-18 PROCEDURE — 700102 HCHG RX REV CODE 250 W/ 637 OVERRIDE(OP): Performed by: INTERNAL MEDICINE

## 2020-02-18 PROCEDURE — G0378 HOSPITAL OBSERVATION PER HR: HCPCS

## 2020-02-18 PROCEDURE — 81001 URINALYSIS AUTO W/SCOPE: CPT

## 2020-02-18 PROCEDURE — 93010 ELECTROCARDIOGRAM REPORT: CPT | Performed by: INTERNAL MEDICINE

## 2020-02-18 PROCEDURE — A9270 NON-COVERED ITEM OR SERVICE: HCPCS | Performed by: HOSPITALIST

## 2020-02-18 RX ORDER — OSELTAMIVIR PHOSPHATE 30 MG/1
30 CAPSULE ORAL 2 TIMES DAILY
Status: DISCONTINUED | OUTPATIENT
Start: 2020-02-18 | End: 2020-02-21 | Stop reason: HOSPADM

## 2020-02-18 RX ADMIN — LATANOPROST 1 DROP: 50 SOLUTION OPHTHALMIC at 21:02

## 2020-02-18 RX ADMIN — BRIMONIDINE TARTRATE 1 DROP: 2 SOLUTION OPHTHALMIC at 17:43

## 2020-02-18 RX ADMIN — SENNOSIDES AND DOCUSATE SODIUM 2 TABLET: 8.6; 5 TABLET ORAL at 17:36

## 2020-02-18 RX ADMIN — AMLODIPINE BESYLATE 5 MG: 5 TABLET ORAL at 05:54

## 2020-02-18 RX ADMIN — BRIMONIDINE TARTRATE 1 DROP: 2 SOLUTION OPHTHALMIC at 05:58

## 2020-02-18 RX ADMIN — ENOXAPARIN SODIUM 40 MG: 100 INJECTION SUBCUTANEOUS at 05:54

## 2020-02-18 RX ADMIN — SENNOSIDES AND DOCUSATE SODIUM 2 TABLET: 8.6; 5 TABLET ORAL at 05:54

## 2020-02-18 RX ADMIN — ACETAMINOPHEN 650 MG: 325 TABLET, FILM COATED ORAL at 15:51

## 2020-02-18 RX ADMIN — OSELTAMIVIR PHOSPHATE 30 MG: 30 CAPSULE ORAL at 10:39

## 2020-02-18 RX ADMIN — OSELTAMIVIR PHOSPHATE 30 MG: 30 CAPSULE ORAL at 21:01

## 2020-02-18 ASSESSMENT — GAIT ASSESSMENTS
ASSISTIVE DEVICE: FRONT WHEEL WALKER
DISTANCE (FEET): 35
GAIT LEVEL OF ASSIST: MINIMAL ASSIST

## 2020-02-18 ASSESSMENT — COGNITIVE AND FUNCTIONAL STATUS - GENERAL
MOVING TO AND FROM BED TO CHAIR: A LOT
WALKING IN HOSPITAL ROOM: A LITTLE
STANDING UP FROM CHAIR USING ARMS: A LOT
MOVING FROM LYING ON BACK TO SITTING ON SIDE OF FLAT BED: A LITTLE
TURNING FROM BACK TO SIDE WHILE IN FLAT BAD: A LOT
MOBILITY SCORE: 13
CLIMB 3 TO 5 STEPS WITH RAILING: TOTAL
SUGGESTED CMS G CODE MODIFIER MOBILITY: CL

## 2020-02-18 NOTE — PROGRESS NOTES
Assumed care at 1900. Patient laying in bed fidgeting with clothes. Patient not keeping clothing on. Patient oriented x2 to name and date of birth. Throughout shift patient spiked fever with high heart rate. Dr notified and orders with interventions completed. Patient placed on droplet precaution for positive influenza A result.  Notified. Frequent Q2 hour repositions. Incontinence checks. RN continue monitor patient.

## 2020-02-18 NOTE — PROGRESS NOTES
Ever from Lab called with critical result of Influenza A at 2340. Critical lab result read back to Ever.  Dr. Knox notified of critical lab result at 0006. Critical lab result read back by Dr. Knox.

## 2020-02-18 NOTE — CARE PLAN
Problem: Nutritional:  Goal: Achieve adequate nutritional intake  Description: Patient will consume 50% of meals    Outcome: NOT MET

## 2020-02-18 NOTE — THERAPY
"Physical Therapy Treatment completed.   Bed Mobility:  Supine to Sit: Moderate Assist  Transfers: Sit to Stand: Minimal Assist  Gait: Level Of Assist: Minimal Assist with Front-Wheel Walker       Plan of Care: Will benefit from Physical Therapy 3 times per week  Discharge Recommendations: Equipment: Front-Wheel Walker. Recommend post-acute placement for continued physical therapy services prior to discharge home.       See \"Rehab Therapy-Acute\" Patient Summary Report for complete documentation.     Pt was seen for therapy txt and presented with poor activity tolerance, balance, confusion, poor gait mechanics, and dec strength. Pt demonstrated with Min to Mod A for all functional mobility. During session pt was able to ambulate about 35ft within her room and demonstrated with inc in SOB and WOB. During short distance ambulation pt demonstrated with SpO2 of 78% on 2L O2. Pt required a seated rest break with cues for deep breathing. Pt was able to recover back to 90% quickly with increase in O2 to 4L. Pt was then brought back down to 2L and able to maintain 92% SpO2 at rest. During transfer pt demonstrates with poor positioning in space during transfers and attempts to sit down early with no chair behind her; pt requires manual facilaition to maintain upright standing posture until she is positioned under seat. Pt continues to be a high fall risk and will benefit from post acute therapy prior to d/c back to facility to improve functional mobility.   "

## 2020-02-18 NOTE — DISCHARGE PLANNING
Care Transition Team Assessment      Information Source  Orientation : Disoriented to Event, Disoriented to Place, Disoriented to Time  Information Given By: Relative  Informant's Name: Alis Blake  Who is responsible for making decisions for patient? : Adult child  Name(s) of Primary Decision Maker: Alis Blake-daughter    Readmission Evaluation  Is this a readmission?: No    Elopement Risk  Legal Hold: No  Ambulatory or Self Mobile in Wheelchair: No-Not an Elopement Risk  Disoriented: Time-At Risk for Elopement, Situation-At Risk for Elopement  Psychiatric Symptoms: None  History of Wandering: No  Elopement this Admit: No  Vocalizing Wanting to Leave: No  Displays Behaviors, Body Language Wanting to Leave: No-Not at Risk for Elopement  Elopement Risk: Not at Risk for Elopement  Wanderguard On: No (See Comments)  Personal Belongings: Hospital Clothing Only    Interdisciplinary Discharge Planning  Does Admitting Nurse Feel This Could be a Complex Discharge?: Yes  Primary Care Physician: [facility]  Lives with - Patient's Self Care Capacity: Attendant / Paid Care Giver  Patient or legal guardian wants to designate a caregiver (see row info): No  Support Systems: Family Member(s)  Housing / Facility: Assisted Living Residence  [facility]  Do You Take your Prescribed Medications Regularly: Yes  Able to Return to Previous ADL's: Future Time w/Therapy  Mobility Issues: Yes  Prior Services: Continuous (24 Hour) Care Giving Per Service  Patient Expects to be Discharged to:: (skilled)  Assistance Needed: Yes  Durable Medical Equipment: Walker, Other - Specify    Discharge Preparedness  What is your plan after discharge?: Skilled nursing facility  What are your discharge supports?: Child  Prior Functional Level: Needs Assist with Activities of Daily Living    Functional Assesment  Prior Functional Level: Needs Assist with Activities of Daily Living    Finances  Prescription  Coverage: Yes    Vision / Hearing Impairment  Vision Impairment : No  Hearing Impairment : No         Advance Directive  Advance Directive?: DPOA for Health Care  Durable Power of  Name and Contact : Alis Blake    Domestic Abuse  Have you ever been the victim of abuse or violence?: No  Physical Abuse or Sexual Abuse: No  Verbal Abuse or Emotional Abuse: No  Possible Abuse Reported to:: Not Applicable         Discharge Risks or Barriers  Discharge risks or barriers?: Post-acute placement / services  Patient risk factors: Cognitive / sensory / physical deficit    Anticipated Discharge Information  Anticipated discharge disposition: SNF  Discharge Address: [facility]

## 2020-02-18 NOTE — PROGRESS NOTES
Patient vitals completed. Patient has fever of 101.7, HR tachy running 117. Respirations 26. Lung sounds wheezy. Dr. Flores informed. BC drawn, chest xray ordered. Lactate ordered. BMP and CBC ordered. Tylenol given per this RN. Influenza swab and Urinalysis ordered. RT notified as well. PRN boluses as needed ordered in place.     RN reassessed vitals at 2253. Temp 98.9, , RR 24, /63 and O2 94% on 2 liters nasal cannula. Lactate resulted at 1.3. RN will continue to monitor.

## 2020-02-18 NOTE — DISCHARGE PLANNING
Anticipated Discharge Disposition: skilled    Action: Met at bedside with daughter Alis Blake. Pt lives at [facility]. The dtr is POA and the AD is on file. Pt uses FWW and Wheelchair, has been getting weaker and weaker so uses wheelchair more. Pt’s pharmacy is [facility]. Pt is followed by [facility], a Dr Cruz? Dtr drives pt to her appts. Pt previously at [facility] skilled and did not like it there so does not want to go to that skilled. Dtr is agreeable to [facility].      Barriers to Discharge: in isolation room for the flu    Plan: pt not yet med cleared from having the flu. Skilled referrals in progress with dtr agreeing to acceptance by [facility] skilled.   Call placed to Emerita at [facility] to let them know that pt has developed the flu and her transfer is currently on hold. Per Emerita¸ pt needs to be on Tamiflu for 3 days and then has to be asymptomatic.

## 2020-02-18 NOTE — CARE PLAN
Problem: Safety  Goal: Will remain free from injury  Outcome: NOT MET  Goal: Will remain free from falls  Outcome: NOT MET  Note: Patient at risk for falls. Patient confused and does not call when needing to get out of bed.      Problem: Pain Management  Goal: Pain level will decrease to patient's comfort goal  Outcome: PROGRESSING AS EXPECTED  Note: Frequent pain assessments. Patient denies pain.

## 2020-02-18 NOTE — DIETARY
Nutrition services: Day 2 of admit. 78 yo female admitted with failure to thrive.  Consult for FTT and malnutrition score of 1 on admitting screen.    Evaluation:  1. Admitting screen indicates 2-13# wt loss over 3 months.  Weight on admit 55 kg per bed scale.  Weight on previous admit February 2019 was 52 kg indicating a weight gain over 1 year.   2. Pt lives in assisted living and has dementia.   3. Currently on a regular diet - no documentation of meals since admit.  RN today reports good intake of breakfast this morning.     Malnutrition risk: na    Recommendations/Plan:  1. encourage intake of meal. Order supplements if intake less than 50% of most m eals.   2. document intake of all meals as % taken in ADL's to provide interdisciplinary communication across all shifts   3. monitor daily weights  4. Nutrition rep will continue to see patient for ongoing meal and snack preferences.

## 2020-02-18 NOTE — PROGRESS NOTES
[facility] Medicine Daily Progress Note    Date of Service  2/18/2020    Chief Complaint  79 y.o. female admitted 2/15/2020 with ground-level fall.    Hospital Course    Patient is a 79-year-old female with known past medical history of hypertension, dyslipidemia, osteoporosis, breast cancer in remission and dementia.  Patient presented after a ground-level fall from assisted living facility.  Patient normally walks with a walker and has had chronic issues with coordination.  On day of admission patient got tripped with her walker and fell on the left side of her chest.  Patient was complaining of pain that was worse with deep inspiration and was transferred to the emergency room for further evaluation.  In the emergency room chest x-ray was obtained which was negative for rib fracture however patient does remain tender on the left side.  Assisted-living is worried and would like patient evaluated for failure to thrive patient to be admitted to CDU for close observation and PT/OT therapy evaluation.  Patient was also noted to have hypertension uncontrolled.      Interval Problem Update  2/16- Patient resting in bed, experiencing intermittent confusion. Awaiting PT/OT evaluations. Patient may require more help than assisted living is able to provide and will place order for skilled nursing facility to help with strength prior to returning.  Hypertension has resolved and is controlled at this time.  2/17- Patient resting in bed, remains confused, and state she is tired today. Having trouble keeping clothes on patient. Choice for SNF obtained from daughter and awaiting acceptance. Transfer order placed.   2/18 -fevers noted overnight, flu swab checked and positive for influenza A.  Tamiflu started this morning.  Respiratory isolation in place.  We will hold off on skilled nursing facility discharge until such time as she is afebrile x24 hours.  Maintain respiratory isolation per facility  protocol.    Consultants/Specialty  None     Code Status  DNR- POLST    Disposition  Referral placed for snf-awaiting clearance per respiratory isolation protocol    Review of Systems  Review of Systems   Unable to perform ROS: Dementia        Physical Exam  Temp:  [36.9 °C (98.4 °F)-38.7 °C (101.7 °F)] 37.6 °C (99.7 °F)  Pulse:  [] 82  Resp:  [17-26] 18  BP: (107-151)/(52-93) 117/57  SpO2:  [91 %-100 %] 100 %    Physical Exam  Vitals signs and nursing note reviewed.   Constitutional:       General: She is awake.      Appearance: She is normal weight. She is not ill-appearing.      Comments: Patient confused and not wanting to keep clothes on    HENT:      Head: Normocephalic.      Mouth/Throat:      Lips: Pink.   Eyes:      General: Lids are normal.      Conjunctiva/sclera: Conjunctivae normal.      Comments: Noted redness surrounding eyes    Cardiovascular:      Rate and Rhythm: Normal rate and regular rhythm.      Heart sounds: No friction rub.   Pulmonary:      Effort: Pulmonary effort is normal.      Breath sounds: Normal breath sounds. No decreased breath sounds.   Chest:      Chest wall: Tenderness present.   Abdominal:      General: Bowel sounds are normal.      Palpations: Abdomen is soft.      Tenderness: There is no abdominal tenderness.   Musculoskeletal:      Comments: NARANJO    Skin:     General: Skin is warm and dry.   Neurological:      Mental Status: She is alert. She is confused.      Comments: Patient not oriented    Psychiatric:         Attention and Perception: Attention normal.         Mood and Affect: Mood normal.         Behavior: Behavior is cooperative.         Thought Content: Thought content is paranoid.         Cognition and Memory: Cognition is impaired. Memory is impaired. She exhibits impaired recent memory.         Judgment: Judgment is impulsive and inappropriate.      Comments: Impaired memory and orientation          Fluids    Intake/Output Summary (Last 24 hours) at 2/18/2020  1328  Last data filed at 2/18/2020 1300  Gross per 24 hour   Intake 240 ml   Output --   Net 240 ml       Laboratory  Recent Labs     02/16/20  0522 02/17/20  2215 02/18/20  0203   WBC 7.2 9.1 8.2   RBC 4.74 4.63 4.63   HEMOGLOBIN 14.4 14.0 13.8   HEMATOCRIT 44.6 42.7 43.5   MCV 94.1 92.2 94.0   MCH 30.4 30.2 29.8   MCHC 32.3* 32.8* 31.7*   RDW 49.1 48.6 49.2   PLATELETCT 187 160* 153*   MPV 11.7 11.9 11.6     Recent Labs     02/16/20  0522 02/17/20  2215 02/18/20  0203   SODIUM 144 140 140   POTASSIUM 4.0 3.8 3.8   CHLORIDE 108 104 105   CO2 26 23 25   GLUCOSE 112* 129* 140*   BUN 28* 23* 26*   CREATININE 0.83 0.91 1.14   CALCIUM 9.6 9.4 9.5     Recent Labs     02/17/20 2215   INR 1.07               Imaging  DX-CHEST-LIMITED (1 VIEW)   Final Result         1.  No acute cardiopulmonary disease.      [id number] (WITH 1-VIEW CXR) LEFT   Final Result      No radiographic evidence of acute bony abnormality      Stable large volumes, cardiac silhouette enlargement and evidence of underlying scarring      Borderline enlarged right hilus. This could be from vascular structures or adenopathy           Current Facility-Administered Medications:   •  oseltamivir (TAMIFLU) capsule 30 mg, 30 mg, Oral, BID, Doug Mendez M.D., 30 mg at 02/18/20 1039  •  Respiratory Therapy Consult, , Nebulization, Continuous RT, Sona Ramires, A.P.R.N.  •  lactated ringers infusion (BOLUS), 500 mL, Intravenous, Once PRN, Sarmad Flores M.D.  •  lactated ringers infusion (BOLUS): BMI less than or equal to 30, 30 mL/kg, Intravenous, Once PRN, Sarmad Flores M.D.  •  albuterol inhaler 2 Puff, 2 Puff, Inhalation, Q4H PRN (RT), Sarmad Flores M.D.  •  ipratropium-albuterol (DUONEB) nebulizer solution, 3 mL, Nebulization, Q2HRS PRN (RT), Sarmad Flores M.D.  •  senna-docusate (PERICOLACE or SENOKOT S) 8.6-50 MG per tablet 2 Tab, 2 Tab, Oral, BID, 2 Tab at 02/18/20 0554 **AND** polyethylene glycol/lytes (MIRALAX) PACKET 1  Packet, 1 Packet, Oral, QDAY PRN **AND** magnesium hydroxide (MILK OF MAGNESIA) suspension 30 mL, 30 mL, Oral, QDAY PRN **AND** bisacodyl (DULCOLAX) suppository 10 mg, 10 mg, Rectal, QDAY PRN, Sarmad Flores M.D.  •  enoxaparin (LOVENOX) inj 40 mg, 40 mg, Subcutaneous, DAILY, Sarmad Flores M.D., 40 mg at 02/18/20 0554  •  acetaminophen (TYLENOL) tablet 650 mg, 650 mg, Oral, Q6HRS PRN, Sarmad Flores M.D., 650 mg at 02/17/20 2149  •  ondansetron (ZOFRAN) syringe/vial injection 4 mg, 4 mg, Intravenous, Q4HRS PRN, Sarmad Flores M.D.  •  ondansetron (ZOFRAN ODT) dispertab 4 mg, 4 mg, Oral, Q4HRS PRN, Sarmad Flores M.D.  •  labetalol (NORMODYNE/TRANDATE) injection 10 mg, 10 mg, Intravenous, Q4HRS PRN, Sarmad Flores M.D., 10 mg at 02/16/20 1708  •  enalaprilat (VASOTEC) injection 1.25 mg, 1.25 mg, Intravenous, Q6HRS PRN, Sarmad Flores M.D.  •  guaiFENesin dextromethorphan (ROBITUSSIN DM) 100-10 MG/5ML syrup 10 mL, 10 mL, Oral, Q6HRS PRN, Sarmad Flores M.D.  •  brimonidine (ALPHAGAN) 0.2 % ophthalmic solution 1 Drop, 1 Drop, Both Eyes, BID, Sarmad Flores M.D., 1 Drop at 02/18/20 0558  •  latanoprost (XALATAN) 0.005 % ophthalmic solution 1 Drop, 1 Drop, Left Eye, Nightly, Sarmad Flores M.D., 1 Drop at 02/17/20 2124  •  levETIRAcetam (KEPPRA) tablet 250 mg, 250 mg, Oral, Q48HRS, Sarmad Flores M.D., 250 mg at 02/17/20 0557  •  amLODIPine (NORVASC) tablet 5 mg, 5 mg, Oral, Q DAY, Sarmad Flores M.D., 5 mg at 02/18/20 0554    Assessment/Plan  HTN (hypertension), benign- (present on admission)  Assessment & Plan  Controlled   Continue amlodipine  Monitor blood pressure  Labetalol IV as needed    Influenza A  Assessment & Plan  Tamiflu started on 2/18/2020.  Maintain respiratory isolation per facility protocol.    Dementia (HCC)  Assessment & Plan  Patient experiencing intermittent confusion, likely related to sundowning  EKG to be obtained - then haldol to help with agitation PRN    Attempt to orient as able   Bed alarm     Debility  Assessment & Plan  Patient will be evaluated by PT OT for appropriateness of her current living situation at assisted living.  Fall precaution    Leukocytosis- (present on admission)  Assessment & Plan  Resolved       Fall- (present on admission)  Assessment & Plan  PT OT evaluation  We will check vitamin D, B12, TSH       VTE prophylaxis: SCD

## 2020-02-18 NOTE — PROGRESS NOTES
Assumed care @ 0715. Bedside report from SHADY Rudolph. Resting in bed and in no distress. Bed in low position, call light w/in reach. Strip bed alarm on.

## 2020-02-19 PROCEDURE — A9270 NON-COVERED ITEM OR SERVICE: HCPCS | Performed by: HOSPITALIST

## 2020-02-19 PROCEDURE — G0378 HOSPITAL OBSERVATION PER HR: HCPCS

## 2020-02-19 PROCEDURE — 96372 THER/PROPH/DIAG INJ SC/IM: CPT

## 2020-02-19 PROCEDURE — 700102 HCHG RX REV CODE 250 W/ 637 OVERRIDE(OP): Performed by: INTERNAL MEDICINE

## 2020-02-19 PROCEDURE — 99224 PR SUBSEQUENT OBSERVATION CARE,LEVEL I: CPT | Performed by: HOSPITALIST

## 2020-02-19 PROCEDURE — 700111 HCHG RX REV CODE 636 W/ 250 OVERRIDE (IP): Performed by: INTERNAL MEDICINE

## 2020-02-19 PROCEDURE — A9270 NON-COVERED ITEM OR SERVICE: HCPCS | Performed by: INTERNAL MEDICINE

## 2020-02-19 PROCEDURE — 700102 HCHG RX REV CODE 250 W/ 637 OVERRIDE(OP): Performed by: HOSPITALIST

## 2020-02-19 RX ADMIN — ACETAMINOPHEN 650 MG: 325 TABLET, FILM COATED ORAL at 20:42

## 2020-02-19 RX ADMIN — OSELTAMIVIR PHOSPHATE 30 MG: 30 CAPSULE ORAL at 08:35

## 2020-02-19 RX ADMIN — AMLODIPINE BESYLATE 5 MG: 5 TABLET ORAL at 06:18

## 2020-02-19 RX ADMIN — BRIMONIDINE TARTRATE 1 DROP: 2 SOLUTION OPHTHALMIC at 06:18

## 2020-02-19 RX ADMIN — LEVETIRACETAM 250 MG: 250 TABLET ORAL at 06:18

## 2020-02-19 RX ADMIN — ENOXAPARIN SODIUM 40 MG: 100 INJECTION SUBCUTANEOUS at 06:18

## 2020-02-19 RX ADMIN — LATANOPROST 1 DROP: 50 SOLUTION OPHTHALMIC at 20:42

## 2020-02-19 RX ADMIN — OSELTAMIVIR PHOSPHATE 30 MG: 30 CAPSULE ORAL at 20:42

## 2020-02-19 RX ADMIN — BRIMONIDINE TARTRATE 1 DROP: 2 SOLUTION OPHTHALMIC at 18:00

## 2020-02-19 NOTE — PROGRESS NOTES
[facility] Medicine Daily Progress Note    Date of Service  2/19/2020    Chief Complaint  79 y.o. female admitted 2/15/2020 with ground-level fall.    Hospital Course    Patient is a 79-year-old female with known past medical history of hypertension, dyslipidemia, osteoporosis, breast cancer in remission and dementia.  Patient presented after a ground-level fall from assisted living facility.  Patient normally walks with a walker and has had chronic issues with coordination.  On day of admission patient got tripped with her walker and fell on the left side of her chest.  Patient was complaining of pain that was worse with deep inspiration and was transferred to the emergency room for further evaluation.  In the emergency room chest x-ray was obtained which was negative for rib fracture however patient does remain tender on the left side.  Assisted-living is worried and would like patient evaluated for failure to thrive patient to be admitted to CDU for close observation and PT/OT therapy evaluation.  Patient was also noted to have hypertension uncontrolled.      Interval Problem Update  2/16- Patient resting in bed, experiencing intermittent confusion. Awaiting PT/OT evaluations. Patient may require more help than assisted living is able to provide and will place order for skilled nursing facility to help with strength prior to returning.  Hypertension has resolved and is controlled at this time.  2/17- Patient resting in bed, remains confused, and state she is tired today. Having trouble keeping clothes on patient. Choice for SNF obtained from daughter and awaiting acceptance. Transfer order placed.   2/18 -fevers noted overnight, flu swab checked and positive for influenza A.  Tamiflu started this morning.  Respiratory isolation in place.  We will hold off on skilled nursing facility discharge until such time as she is afebrile x24 hours.  Maintain respiratory isolation per facility protocol.  2/19-no acute  overnight events, blood pressure very marginally low overnight, blood pressure medications adjusted.    Consultants/Specialty  None     Code Status  DNR- POLST    Disposition  Referral placed for snf-awaiting clearance per respiratory isolation protocol    Review of Systems  Review of Systems   Unable to perform ROS: Dementia        Physical Exam  Temp:  [36.6 °C (97.9 °F)-37.5 °C (99.5 °F)] 36.9 °C (98.5 °F)  Pulse:  [75-97] 93  Resp:  [17-18] 18  BP: ()/(60-80) 107/64  SpO2:  [90 %-93 %] 90 %    Physical Exam  Vitals signs and nursing note reviewed.   Constitutional:       General: She is awake.      Appearance: She is normal weight. She is not ill-appearing.      Comments: Patient confused and not wanting to keep clothes on    HENT:      Head: Normocephalic.      Mouth/Throat:      Lips: Pink.   Eyes:      General: Lids are normal.      Conjunctiva/sclera: Conjunctivae normal.      Comments: Noted redness surrounding eyes    Cardiovascular:      Rate and Rhythm: Normal rate and regular rhythm.      Heart sounds: No friction rub.   Pulmonary:      Effort: Pulmonary effort is normal.      Breath sounds: Normal breath sounds. No decreased breath sounds.   Chest:      Chest wall: Tenderness present.   Abdominal:      General: Bowel sounds are normal.      Palpations: Abdomen is soft.      Tenderness: There is no abdominal tenderness.   Musculoskeletal:      Comments: NARANJO    Skin:     General: Skin is warm and dry.   Neurological:      Mental Status: She is alert. She is confused.      Comments: Patient not oriented    Psychiatric:         Attention and Perception: Attention normal.         Mood and Affect: Mood normal.         Behavior: Behavior is cooperative.         Thought Content: Thought content is paranoid.         Cognition and Memory: Cognition is impaired. Memory is impaired. She exhibits impaired recent memory.         Judgment: Judgment is impulsive and inappropriate.      Comments: Impaired memory  and orientation          Fluids    Intake/Output Summary (Last 24 hours) at 2/19/2020 1402  Last data filed at 2/19/2020 1300  Gross per 24 hour   Intake 327 ml   Output --   Net 327 ml       Laboratory  Recent Labs     02/17/20 2215 02/18/20  0203   WBC 9.1 8.2   RBC 4.63 4.63   HEMOGLOBIN 14.0 13.8   HEMATOCRIT 42.7 43.5   MCV 92.2 94.0   MCH 30.2 29.8   MCHC 32.8* 31.7*   RDW 48.6 49.2   PLATELETCT 160* 153*   MPV 11.9 11.6     Recent Labs     02/17/20 2215 02/18/20  0203   SODIUM 140 140   POTASSIUM 3.8 3.8   CHLORIDE 104 105   CO2 23 25   GLUCOSE 129* 140*   BUN 23* 26*   CREATININE 0.91 1.14   CALCIUM 9.4 9.5     Recent Labs     02/17/20 2215   INR 1.07               Imaging  DX-CHEST-LIMITED (1 VIEW)   Final Result         1.  No acute cardiopulmonary disease.      [id number] (WITH 1-VIEW CXR) LEFT   Final Result      No radiographic evidence of acute bony abnormality      Stable large volumes, cardiac silhouette enlargement and evidence of underlying scarring      Borderline enlarged right hilus. This could be from vascular structures or adenopathy           Current Facility-Administered Medications:   •  oseltamivir (TAMIFLU) capsule 30 mg, 30 mg, Oral, BID, Doug Mendez M.D., 30 mg at 02/19/20 0835  •  Respiratory Therapy Consult, , Nebulization, Continuous RT, Sona Ramires, A.P.R.N.  •  lactated ringers infusion (BOLUS), 500 mL, Intravenous, Once PRN, Sarmad Flores M.D.  •  lactated ringers infusion (BOLUS): BMI less than or equal to 30, 30 mL/kg, Intravenous, Once PRN, Sarmad Flores M.D.  •  albuterol inhaler 2 Puff, 2 Puff, Inhalation, Q4H PRN (RT), Sarmad Flores M.D.  •  ipratropium-albuterol (DUONEB) nebulizer solution, 3 mL, Nebulization, Q2HRS PRN (RT), Sarmad Kuharevic, M.D.  •  senna-docusate (PERICOLACE or SENOKOT S) 8.6-50 MG per tablet 2 Tab, 2 Tab, Oral, BID, Stopped at 02/19/20 0619 **AND** polyethylene glycol/lytes (MIRALAX) PACKET 1 Packet, 1 Packet, Oral, QDAY  PRN **AND** magnesium hydroxide (MILK OF MAGNESIA) suspension 30 mL, 30 mL, Oral, QDAY PRN **AND** bisacodyl (DULCOLAX) suppository 10 mg, 10 mg, Rectal, QDAY PRN, Sarmad Flores M.D.  •  enoxaparin (LOVENOX) inj 40 mg, 40 mg, Subcutaneous, DAILY, Sarmad Flores M.D., 40 mg at 02/19/20 0618  •  acetaminophen (TYLENOL) tablet 650 mg, 650 mg, Oral, Q6HRS PRN, Sarmad Flores M.D., 650 mg at 02/18/20 1551  •  ondansetron (ZOFRAN) syringe/vial injection 4 mg, 4 mg, Intravenous, Q4HRS PRN, Sarmad Flores M.D.  •  ondansetron (ZOFRAN ODT) dispertab 4 mg, 4 mg, Oral, Q4HRS PRN, Sarmad Flores M.D.  •  labetalol (NORMODYNE/TRANDATE) injection 10 mg, 10 mg, Intravenous, Q4HRS PRN, Sarmad Flores M.D., 10 mg at 02/16/20 1708  •  enalaprilat (VASOTEC) injection 1.25 mg, 1.25 mg, Intravenous, Q6HRS PRN, Sarmad Flores M.D.  •  guaiFENesin dextromethorphan (ROBITUSSIN DM) 100-10 MG/5ML syrup 10 mL, 10 mL, Oral, Q6HRS PRN, Sarmad Flores M.D.  •  brimonidine (ALPHAGAN) 0.2 % ophthalmic solution 1 Drop, 1 Drop, Both Eyes, BID, Sarmad Flores M.D., 1 Drop at 02/19/20 0618  •  latanoprost (XALATAN) 0.005 % ophthalmic solution 1 Drop, 1 Drop, Left Eye, Nightly, Sarmad Flores M.D., 1 Drop at 02/18/20 2102  •  levETIRAcetam (KEPPRA) tablet 250 mg, 250 mg, Oral, Q48HRS, Sarmad Flores M.D., 250 mg at 02/19/20 0618    Assessment/Plan  HTN (hypertension), benign- (present on admission)  Assessment & Plan  Controlled   Continue amlodipine  Monitor blood pressure  Labetalol IV as needed    Influenza A  Assessment & Plan  Tamiflu started on 2/18/2020.  Maintain respiratory isolation per facility protocol.    Dementia (HCC)  Assessment & Plan  Patient experiencing intermittent confusion, likely related to sundowning  EKG to be obtained - then haldol to help with agitation PRN   Attempt to orient as able   Bed alarm     Debility  Assessment & Plan  Patient will be evaluated by PT OT for appropriateness of her  current living situation at assisted living.  Fall precaution    Leukocytosis- (present on admission)  Assessment & Plan  Resolved       Fall- (present on admission)  Assessment & Plan  PT OT evaluation  We will check vitamin D, B12, TSH       VTE prophylaxis: SCD

## 2020-02-19 NOTE — PROGRESS NOTES
Assumed care of patient at 1900 from day RN. Pt sleeping in bed with no signs of distress. Pt continues on 2L oxygen via nasal cannula. Bed in the lowest locked position,bed alarm active, call light in reach. Hourly rounding in place.

## 2020-02-19 NOTE — PROGRESS NOTES
Received bedside report and assumed pt care at 0700. Pt resting with no signs of acute distress. VSS. A&O to self only. Assessment complete, orders reviewed, labs reviewed. Updated whiteboard and discussed plan of care with pt. Continuing tamiflu and pt accepted at [facility]. Droplet precautions in place. Bed alarm on. Bed locked and in lowest position. Fall precautions in place. Call light within reach. No further needs at this time. Hourly rounding in place.

## 2020-02-19 NOTE — CARE PLAN
Problem: Safety  Goal: Will remain free from falls  Outcome: PROGRESSING AS EXPECTED  Intervention: Implement fall precautions  Note: Pt admitted with Hx of falls. [place] fall precautions in place.

## 2020-02-19 NOTE — CARE PLAN
Problem: Bowel/Gastric:  Goal: Normal bowel function is maintained or improved  Outcome: PROGRESSING AS EXPECTED  Note: Pt having several loose bowel movements during  the shift.      Problem: Respiratory:  Goal: Respiratory status will improve  Outcome: PROGRESSING SLOWER THAN EXPECTED  Note: Pt remains on 2L oxygen via nasal cannula.

## 2020-02-19 NOTE — DISCHARGE PLANNING
Anticipated Discharge Disposition: skilled    Action: Reviewed with dtr over the phone that [facility] and she wants pt to go [facility]. Explained to her that we need to wait 3 days for pt to be on the Tamiflu and then pt needs to be symptom free.     Barriers to Discharge: needs to be on Tamiflu x 3 days and then symptom free    Plan: plan transfer to [facility] possibly 2/21/20.

## 2020-02-20 PROCEDURE — 700111 HCHG RX REV CODE 636 W/ 250 OVERRIDE (IP): Performed by: INTERNAL MEDICINE

## 2020-02-20 PROCEDURE — 97535 SELF CARE MNGMENT TRAINING: CPT

## 2020-02-20 PROCEDURE — 96372 THER/PROPH/DIAG INJ SC/IM: CPT

## 2020-02-20 PROCEDURE — 700102 HCHG RX REV CODE 250 W/ 637 OVERRIDE(OP): Performed by: HOSPITALIST

## 2020-02-20 PROCEDURE — 97530 THERAPEUTIC ACTIVITIES: CPT | Mod: XU

## 2020-02-20 PROCEDURE — G0378 HOSPITAL OBSERVATION PER HR: HCPCS

## 2020-02-20 PROCEDURE — A9270 NON-COVERED ITEM OR SERVICE: HCPCS | Performed by: HOSPITALIST

## 2020-02-20 PROCEDURE — 99224 PR SUBSEQUENT OBSERVATION CARE,LEVEL I: CPT | Performed by: HOSPITALIST

## 2020-02-20 RX ADMIN — LATANOPROST 1 DROP: 50 SOLUTION OPHTHALMIC at 19:59

## 2020-02-20 RX ADMIN — ENOXAPARIN SODIUM 40 MG: 100 INJECTION SUBCUTANEOUS at 06:12

## 2020-02-20 RX ADMIN — OSELTAMIVIR PHOSPHATE 30 MG: 30 CAPSULE ORAL at 09:48

## 2020-02-20 RX ADMIN — OSELTAMIVIR PHOSPHATE 30 MG: 30 CAPSULE ORAL at 19:59

## 2020-02-20 RX ADMIN — BRIMONIDINE TARTRATE 1 DROP: 2 SOLUTION OPHTHALMIC at 06:12

## 2020-02-20 RX ADMIN — BRIMONIDINE TARTRATE 1 DROP: 2 SOLUTION OPHTHALMIC at 17:55

## 2020-02-20 ASSESSMENT — COGNITIVE AND FUNCTIONAL STATUS - GENERAL
DRESSING REGULAR LOWER BODY CLOTHING: A LOT
EATING MEALS: A LITTLE
DAILY ACTIVITIY SCORE: 17
HELP NEEDED FOR BATHING: A LITTLE
TOILETING: A LITTLE
PERSONAL GROOMING: A LITTLE
DRESSING REGULAR UPPER BODY CLOTHING: A LITTLE
SUGGESTED CMS G CODE MODIFIER DAILY ACTIVITY: CK

## 2020-02-20 NOTE — CARE PLAN
Problem: Safety  Goal: Will remain free from injury  Outcome: PROGRESSING AS EXPECTED  Note: Pt up x1-2 assist, unsteady gait, bed and chair alarm on, and call light within reach. Bed in lowest and locked position.      Problem: Knowledge Deficit  Goal: Knowledge of disease process/condition, treatment plan, diagnostic tests, and medications will improve  Outcome: PROGRESSING AS EXPECTED  Note: Pt oriented to plan of care and education given regarding medications.

## 2020-02-20 NOTE — PROGRESS NOTES
Assumed care of patient at 0140. Received report from Kirill CARUSO. Patient resting comfortably in bed sleeping. No signs of distress. Bed is in low and locked position. Non-slip socks are in place. Call light is within reach. Bed alarm is on.

## 2020-02-20 NOTE — CARE PLAN
Problem: Safety  Goal: Will remain free from injury  Outcome: PROGRESSING AS EXPECTED  Goal: Will remain free from falls  Outcome: PROGRESSING AS EXPECTED  Intervention: Assess risk factors for falls  Note: No falls or injury during shift, safety precaution in place at all times.      Problem: Infection  Goal: Will remain free from infection  Outcome: PROGRESSING AS EXPECTED  Intervention: Assess signs and symptoms of infection  Note: No signs of new infection during shift, pt on droplet precaution, infection prevention protocol in place at all times.

## 2020-02-20 NOTE — PROGRESS NOTES
[facility] Medicine Daily Progress Note    Date of Service  2/20/2020    Chief Complaint  79 y.o. female admitted 2/15/2020 with ground-level fall.    Hospital Course    Patient is a 79-year-old female with known past medical history of hypertension, dyslipidemia, osteoporosis, breast cancer in remission and dementia.  Patient presented after a ground-level fall from assisted living facility.  Patient normally walks with a walker and has had chronic issues with coordination.  On day of admission patient got tripped with her walker and fell on the left side of her chest.  Patient was complaining of pain that was worse with deep inspiration and was transferred to the emergency room for further evaluation.  In the emergency room chest x-ray was obtained which was negative for rib fracture however patient does remain tender on the left side.  Assisted-living is worried and would like patient evaluated for failure to thrive patient to be admitted to CDU for close observation and PT/OT therapy evaluation.  Patient was also noted to have hypertension uncontrolled.      Interval Problem Update  2/16- Patient resting in bed, experiencing intermittent confusion. Awaiting PT/OT evaluations. Patient may require more help than assisted living is able to provide and will place order for skilled nursing facility to help with strength prior to returning.  Hypertension has resolved and is controlled at this time.  2/17- Patient resting in bed, remains confused, and state she is tired today. Having trouble keeping clothes on patient. Choice for SNF obtained from daughter and awaiting acceptance. Transfer order placed.   2/18 -fevers noted overnight, flu swab checked and positive for influenza A.  Tamiflu started this morning.  Respiratory isolation in place.  We will hold off on skilled nursing facility discharge until such time as she is afebrile x24 hours.  Maintain respiratory isolation per facility protocol.  2/19-no acute  overnight events, blood pressure very marginally low overnight, blood pressure medications adjusted.  2/20-no acute overnight events, blood pressure improving marginally, stable for skilled nursing facility placement once respiratory isolation discontinued, possibly tomorrow.    Consultants/Specialty  None     Code Status  DNR- POLST    Disposition  Referral placed for snf-awaiting clearance per respiratory isolation protocol    Review of Systems  Review of Systems   Unable to perform ROS: Dementia        Physical Exam  Temp:  [36.4 °C (97.5 °F)-37.8 °C (100 °F)] 36.6 °C (97.9 °F)  Pulse:  [] 87  Resp:  [18] 18  BP: ()/(47-88) 95/51  SpO2:  [94 %-95 %] 95 %    Physical Exam  Vitals signs and nursing note reviewed.   Constitutional:       General: She is awake.      Appearance: She is normal weight. She is not ill-appearing.      Comments: Patient confused and not wanting to keep clothes on    HENT:      Head: Normocephalic.      Mouth/Throat:      Lips: Pink.   Eyes:      General: Lids are normal.      Conjunctiva/sclera: Conjunctivae normal.      Comments: Noted redness surrounding eyes    Cardiovascular:      Rate and Rhythm: Normal rate and regular rhythm.      Heart sounds: No friction rub.   Pulmonary:      Effort: Pulmonary effort is normal.      Breath sounds: Normal breath sounds. No decreased breath sounds.   Chest:      Chest wall: Tenderness present.   Abdominal:      General: Bowel sounds are normal.      Palpations: Abdomen is soft.      Tenderness: There is no abdominal tenderness.   Musculoskeletal:      Comments: NARANJO    Skin:     General: Skin is warm and dry.   Neurological:      Mental Status: She is alert. She is confused.      Comments: Patient not oriented    Psychiatric:         Attention and Perception: Attention normal.         Mood and Affect: Mood normal.         Behavior: Behavior is cooperative.         Thought Content: Thought content is paranoid.         Cognition and  Memory: Cognition is impaired. Memory is impaired. She exhibits impaired recent memory.         Judgment: Judgment is impulsive and inappropriate.      Comments: Impaired memory and orientation          Fluids    Intake/Output Summary (Last 24 hours) at 2/20/2020 1359  Last data filed at 2/20/2020 1300  Gross per 24 hour   Intake 950 ml   Output --   Net 950 ml       Laboratory  Recent Labs     02/17/20 2215 02/18/20  0203   WBC 9.1 8.2   RBC 4.63 4.63   HEMOGLOBIN 14.0 13.8   HEMATOCRIT 42.7 43.5   MCV 92.2 94.0   MCH 30.2 29.8   MCHC 32.8* 31.7*   RDW 48.6 49.2   PLATELETCT 160* 153*   MPV 11.9 11.6     Recent Labs     02/17/20 2215 02/18/20  0203   SODIUM 140 140   POTASSIUM 3.8 3.8   CHLORIDE 104 105   CO2 23 25   GLUCOSE 129* 140*   BUN 23* 26*   CREATININE 0.91 1.14   CALCIUM 9.4 9.5     Recent Labs     02/17/20 2215   INR 1.07               Imaging  DX-CHEST-LIMITED (1 VIEW)   Final Result         1.  No acute cardiopulmonary disease.      [id number] (WITH 1-VIEW CXR) LEFT   Final Result      No radiographic evidence of acute bony abnormality      Stable large volumes, cardiac silhouette enlargement and evidence of underlying scarring      Borderline enlarged right hilus. This could be from vascular structures or adenopathy           Current Facility-Administered Medications:   •  oseltamivir (TAMIFLU) capsule 30 mg, 30 mg, Oral, BID, Doug Mendez M.D., 30 mg at 02/20/20 0948  •  Respiratory Therapy Consult, , Nebulization, Continuous RT, CYN [email or web address].P.[email or web address]  •  lactated ringers infusion (BOLUS), 500 mL, Intravenous, Once PRN, Sarmad Flores M.D.  •  lactated ringers infusion (BOLUS): BMI less than or equal to 30, 30 mL/kg, Intravenous, Once PRN, Sarmad Flores M.D.  •  albuterol inhaler 2 Puff, 2 Puff, Inhalation, Q4H PRN (RT), Sarmad Flores M.D.  •  ipratropium-albuterol (DUONEB) nebulizer solution, 3 mL, Nebulization, Q2HRS PRN (RT), Sarmad Flores M.D.  •  xenia-docusate  (PERICOLACE or SENOKOT S) 8.6-50 MG per tablet 2 Tab, 2 Tab, Oral, BID, Stopped at 02/19/20 0619 **AND** polyethylene glycol/lytes (MIRALAX) PACKET 1 Packet, 1 Packet, Oral, QDAY PRN **AND** magnesium hydroxide (MILK OF MAGNESIA) suspension 30 mL, 30 mL, Oral, QDAY PRN **AND** bisacodyl (DULCOLAX) suppository 10 mg, 10 mg, Rectal, QDAY PRN, Sarmad Flores M.D.  •  enoxaparin (LOVENOX) inj 40 mg, 40 mg, Subcutaneous, DAILY, Sarmad Flores M.D., 40 mg at 02/20/20 0612  •  acetaminophen (TYLENOL) tablet 650 mg, 650 mg, Oral, Q6HRS PRN, Sarmad Flores M.D., 650 mg at 02/19/20 2042  •  ondansetron (ZOFRAN) syringe/vial injection 4 mg, 4 mg, Intravenous, Q4HRS PRN, Sarmad Flores M.D.  •  ondansetron (ZOFRAN ODT) dispertab 4 mg, 4 mg, Oral, Q4HRS PRN, Sarmad Flores M.D.  •  labetalol (NORMODYNE/TRANDATE) injection 10 mg, 10 mg, Intravenous, Q4HRS PRN, Sarmad Flores M.D., 10 mg at 02/16/20 1708  •  enalaprilat (VASOTEC) injection 1.25 mg, 1.25 mg, Intravenous, Q6HRS PRN, Sarmad Flores M.D.  •  guaiFENesin dextromethorphan (ROBITUSSIN DM) 100-10 MG/5ML syrup 10 mL, 10 mL, Oral, Q6HRS PRN, Sarmad Flores M.D.  •  brimonidine (ALPHAGAN) 0.2 % ophthalmic solution 1 Drop, 1 Drop, Both Eyes, BID, Sarmad Flores M.D., 1 Drop at 02/20/20 0612  •  latanoprost (XALATAN) 0.005 % ophthalmic solution 1 Drop, 1 Drop, Left Eye, Nightly, Sarmad Flores M.D., 1 Drop at 02/19/20 2042  •  levETIRAcetam (KEPPRA) tablet 250 mg, 250 mg, Oral, Q48HRS, Sarmad Flores M.D., 250 mg at 02/19/20 0618    Assessment/Plan  HTN (hypertension), benign- (present on admission)  Assessment & Plan  Controlled   Continue amlodipine  Monitor blood pressure  Labetalol IV as needed    Influenza A  Assessment & Plan  Tamiflu started on 2/18/2020.  Maintain respiratory isolation per facility protocol.    Dementia (HCC)  Assessment & Plan  Patient experiencing intermittent confusion, likely related to sundowning  EKG to be  obtained - then haldol to help with agitation PRN   Attempt to orient as able   Bed alarm     Debility  Assessment & Plan  Patient will be evaluated by PT OT for appropriateness of her current living situation at assisted living.  Fall precaution    Leukocytosis- (present on admission)  Assessment & Plan  Resolved       Fall- (present on admission)  Assessment & Plan  PT OT evaluation  We will check vitamin D, B12, TSH       VTE prophylaxis: SCD

## 2020-02-20 NOTE — DISCHARGE PLANNING
Anticipated Discharge Disposition: [facility]    Action: Pt discussed in IDT rounds. Day 3 of Tamiflu is tomorrow and should be medically clear to transfer.    LSW requested CCA to confirm with [facility].    Barriers to Discharge: None    Plan: LSW continue to coordinate discharge.

## 2020-02-20 NOTE — PROGRESS NOTES
Pt is confused and only oriented to self, cannot stated pain of SOB, transfers to chair with 2x assist, on 2L NC. Lung diminished, heart regular, bowel sound normative. Pt temp was 37.8, tylenol given.

## 2020-02-20 NOTE — DISCHARGE PLANNING
Agency/Facility Name: [facility]  Spoke To: Emerita  Outcome: Can accept patient tomorrow. CCA to call in AM to arrange transportation.     LSKIRK Graves notified.

## 2020-02-20 NOTE — THERAPY
OT treatment provided.   SBA kole edge of bed, CG transfers, mobility yo bathroom using fww. SBA for toiletting, CG to stand and perform hygiene. Back to chair with CG, min cues for sequence, safety. Set up for self feeding.

## 2020-02-20 NOTE — PROGRESS NOTES
· 2 RN skin check complete. Chyna CARUSO  · Devices in place 20g IV RFA.  · Skin assessed under devices C/D/I.  · Confirmed pressure ulcers found on None.  · New potential pressure ulcers noted on Non. Wound consult placed? N/A. Photo uploaded? N/A.   · The following interventions are in place Pt refused to ambulate waffle cushion order.

## 2020-02-21 VITALS
OXYGEN SATURATION: 95 % | TEMPERATURE: 97.6 F | WEIGHT: 121.25 LBS | RESPIRATION RATE: 16 BRPM | SYSTOLIC BLOOD PRESSURE: 109 MMHG | DIASTOLIC BLOOD PRESSURE: 62 MMHG | HEIGHT: 64 IN | BODY MASS INDEX: 20.7 KG/M2 | HEART RATE: 67 BPM

## 2020-02-21 PROBLEM — D72.829 LEUKOCYTOSIS: Status: RESOLVED | Noted: 2019-01-12 | Resolved: 2020-02-21

## 2020-02-21 PROBLEM — J10.1 INFLUENZA A: Status: RESOLVED | Noted: 2020-02-18 | Resolved: 2020-02-21

## 2020-02-21 PROCEDURE — 700102 HCHG RX REV CODE 250 W/ 637 OVERRIDE(OP): Performed by: INTERNAL MEDICINE

## 2020-02-21 PROCEDURE — 700102 HCHG RX REV CODE 250 W/ 637 OVERRIDE(OP): Performed by: HOSPITALIST

## 2020-02-21 PROCEDURE — 700111 HCHG RX REV CODE 636 W/ 250 OVERRIDE (IP): Performed by: INTERNAL MEDICINE

## 2020-02-21 PROCEDURE — A9270 NON-COVERED ITEM OR SERVICE: HCPCS | Performed by: INTERNAL MEDICINE

## 2020-02-21 PROCEDURE — G0378 HOSPITAL OBSERVATION PER HR: HCPCS

## 2020-02-21 PROCEDURE — 99217 PR OBSERVATION CARE DISCHARGE: CPT | Performed by: HOSPITALIST

## 2020-02-21 PROCEDURE — 96372 THER/PROPH/DIAG INJ SC/IM: CPT

## 2020-02-21 PROCEDURE — A9270 NON-COVERED ITEM OR SERVICE: HCPCS | Performed by: HOSPITALIST

## 2020-02-21 RX ORDER — OSELTAMIVIR PHOSPHATE 30 MG/1
30 CAPSULE ORAL 2 TIMES DAILY
Qty: 4 CAP | Refills: 0
Start: 2020-02-21 | End: 2020-02-23

## 2020-02-21 RX ORDER — IPRATROPIUM BROMIDE AND ALBUTEROL SULFATE 2.5; .5 MG/3ML; MG/3ML
3 SOLUTION RESPIRATORY (INHALATION) EVERY 4 HOURS PRN
Qty: 30 BULLET | Refills: 0
Start: 2020-02-21

## 2020-02-21 RX ADMIN — SENNOSIDES AND DOCUSATE SODIUM 2 TABLET: 8.6; 5 TABLET ORAL at 04:52

## 2020-02-21 RX ADMIN — OSELTAMIVIR PHOSPHATE 30 MG: 30 CAPSULE ORAL at 08:29

## 2020-02-21 RX ADMIN — BRIMONIDINE TARTRATE 1 DROP: 2 SOLUTION OPHTHALMIC at 05:04

## 2020-02-21 RX ADMIN — LEVETIRACETAM 250 MG: 250 TABLET ORAL at 04:52

## 2020-02-21 RX ADMIN — ENOXAPARIN SODIUM 40 MG: 100 INJECTION SUBCUTANEOUS at 04:52

## 2020-02-21 ASSESSMENT — PAIN SCALES - WONG BAKER
WONGBAKER_NUMERICALRESPONSE: DOESN'T HURT AT ALL
WONGBAKER_NUMERICALRESPONSE: DOESN'T HURT AT ALL

## 2020-02-21 NOTE — DISCHARGE PLANNING
Received Transport Form @ 0900  Spoke to Shen @ [employer]    Transport is scheduled for 2/21 @1400 going to [facility].

## 2020-02-21 NOTE — DISCHARGE INSTRUCTIONS
Discharge Instructions per KYLE Quintero.    Please follow up with PCP  Avoid obstacles with walker  Stay well hydrated   DIET: As tolerated   ACTIVITY: As tolerated   DIAGNOSIS: Ground level fall   Return to ER if recurrent falls, dizziness, altered mentation   ______________________________________________________________________________________________    Discharge Instructions    Discharged to other by medical transportation with self. Discharged via wheelchair, hospital escort: Yes.  Special equipment needed: Not Applicable    Be sure to schedule a follow-up appointment with your primary care doctor or any specialists as instructed.     Discharge Plan:   Diet Plan: Discussed  Activity Level: Discussed  Confirmed Follow up Appointment: No (Comments)  Confirmed Symptoms Management: Discussed  Medication Reconciliation Updated: Yes  Influenza Vaccine Indication: Not indicated: Previously immunized this influenza season and > 8 years of age    I understand that a diet low in cholesterol, fat, and sodium is recommended for good health. Unless I have been given specific instructions below for another diet, I accept this instruction as my diet prescription.   Other diet: Regular    Special Instructions: None    · Is patient discharged on Warfarin / Coumadin?   No     Depression / Suicide Risk    As you are discharged from this [facility] facility, it is important to learn how to keep safe from harming yourself.    Recognize the warning signs:  · Abrupt changes in personality, positive or negative- including increase in energy   · Giving away possessions  · Change in eating patterns- significant weight changes-  positive or negative  · Change in sleeping patterns- unable to sleep or sleeping all the time   · Unwillingness or inability to communicate  · Depression  · Unusual sadness, discouragement and loneliness  · Talk of wanting to die  · Neglect of personal appearance   · Rebelliousness- reckless  behavior  · Withdrawal from people/activities they love  · Confusion- inability to concentrate     If you or a loved one observes any of these behaviors or has concerns about self-harm, here's what you can do:  · Talk about it- your feelings and reasons for harming yourself  · Remove any means that you might use to hurt yourself (examples: pills, rope, extension cords, firearm)  · Get professional help from the community (Mental Health, Substance Abuse, psychological counseling)  · Do not be alone:Call your Safe Contact- someone whom you trust who will be there for you.  · Call your local CRISIS HOTLINE 994-6005 or 447-302-7520  · Call your local Children's Mobile Crisis Response Team [place] (418) 026-6227 or www.[employer]  · Call the toll free National Suicide Prevention Hotlines   · National Suicide Prevention Lifeline 954-670-OQNH (8808)  · National Hope Line Network 800-SUICIDE (547-6494)

## 2020-02-21 NOTE — CARE PLAN
Problem: Pain Management  Goal: Pain level will decrease to patient's comfort goal  Outcome: PROGRESSING AS EXPECTED  Flowsheets (Taken 2/21/2020 4624)  Conrad-Baker Scale: 0   Pain Rating Scale (NPRS): 0  Note: Denies pain or discomfort. Rest and repositioning is effective for pain mgmt and prevention.     Problem: Knowledge Deficit  Goal: Knowledge of disease process/condition, treatment plan, diagnostic tests, and medications will improve  Outcome: PROGRESSING SLOWER THAN EXPECTED  Note: Receptive to education about POC and current medications. Forgetful and has very poor memory; requires constant reinforcement of education.

## 2020-02-21 NOTE — CARE PLAN
Problem: Nutritional:  Goal: Achieve adequate nutritional intake  Description: Patient will consume 50% of meals    Outcome: PROGRESSING AS EXPECTED     Pt taking 25-75% of meals

## 2020-02-21 NOTE — DISCHARGE SUMMARY
Discharge Summary    CHIEF COMPLAINT ON ADMISSION  Chief Complaint   Patient presents with   • Fall       Reason for Admission  EMS 64     Admission Date  2/15/2020    CODE STATUS  DNAR/DNI    HPI & HOSPITAL COURSE   Patient is a 79-year-old female with known past medical history of hypertension, dyslipidemia, osteoporosis, breast cancer in remission and dementia.  Patient presented after a ground-level fall from assisted living facility.  Patient normally walks with a walker and has had chronic issues with coordination.  On day of admission patient got tripped with her walker and fell on the left side of her chest.  Patient was complaining of pain that was worse with deep inspiration and was transferred to the emergency room for further evaluation.  In the emergency room chest x-ray was obtained which was negative for rib fracture however patient does remain tender on the left side.  Assisted-living is worried and would like patient evaluated for failure to thrive patient to be admitted to CDU for close observation and PT/OT therapy evaluation.  Patient was also noted to have hypertension uncontrolled.      Patient benefited from physical therapy and Occupational Therapy evaluations, and was noted to be somewhat below her prior level of function.  Further work-up was underway when patient was noted to spike a fever.  This was evaluated with a rapid flu antigen test, and she was noted to be positive for influenza A.  Treatment with Tamiflu was commenced, patient placed on respiratory contact isolation.  Patient was noted to improve somewhat rapidly with this treatment, return to her prior level of function, and was stable for discharge thereafter.  Of note, patient was maintained in the inpatient setting for a few additional days in order to maintain appropriate respiratory isolation which could not be safely accommodated at her home assisted living facility.    Therefore, she is discharged in good and stable  condition to skilled nursing facility.    The patient met 2-midnight criteria for an inpatient stay at the time of discharge.    Discharge Date  02/21/20      FOLLOW UP ITEMS POST DISCHARGE  With PCP as scheduled.     DISCHARGE DIAGNOSES  Active Problems:    HTN (hypertension), benign POA: Yes    Fall POA: Yes    Dementia (HCC) POA: Unknown  Resolved Problems:    Leukocytosis POA: Yes    Influenza A POA: Unknown      FOLLOW UP  No future appointments.  Mayo Oswald M.D.  [address] [place] [id number]  [phone]    In 1 week  for hospital follow up       MEDICATIONS ON DISCHARGE     Medication List      START taking these medications      Instructions   enoxaparin 40 MG/0.4ML Soln inj  Start taking on:  February 22, 2020  Commonly known as:  LOVENOX   Inject 40 mg as instructed every day for 21 days. May discontinue once ambulatory.  Dose:  40 mg     ipratropium-albuterol 0.5-2.5 (3) MG/3ML nebulizer solution  Commonly known as:  DUONEB   3 mL by Nebulization route every four hours as needed for Shortness of Breath.  Dose:  3 mL     oseltamivir 30 MG Caps  Commonly known as:  TAMIFLU   Take 1 Cap by mouth 2 times a day for 2 days.  Dose:  30 mg        CONTINUE taking these medications      Instructions   Combigan 0.2-0.5 % Soln  Generic drug:  Brimonidine Tartrate-Timolol   Place 2 Drops in both eyes See Admin Instructions. 1 DROP IN RIGHT EYE TWICE DAILY  1 DROP IN LEFT EYE EVERY MORNING.  Dose:  2 Drop     folic acid 1 MG Tabs  Commonly known as:  FOLVITE   Take 1 mg by mouth every Monday, Wednesday, and Friday.  Dose:  1 mg     latanoprost 0.005 % Soln  Commonly known as:  XALATAN   1 Drop by Ophthalmic route every evening.  Dose:  1 Drop     levETIRAcetam 250 MG tablet  Commonly known as:  KEPPRA   Take 250 mg by mouth every 48 hours.  Dose:  250 mg     Vitamin D 50 MCG (2000 UT) Caps   Take 2,000 Units by mouth every day.  Dose:  2,000 Units            Allergies  Allergies   Allergen Reactions   •  Tamoxifen Anaphylaxis     bleeding       DIET  Orders Placed This Encounter   Procedures   • Diet Order Regular     Standing Status:   Standing     Number of Occurrences:   1     Order Specific Question:   Diet:     Answer:   Regular [1]       ACTIVITY  As tolerated.  Weight bearing as tolerated    CONSULTATIONS  None    RADIOLOGY  DX-CHEST-LIMITED (1 VIEW)   Final Result         1.  No acute cardiopulmonary disease.      [id number] (WITH 1-VIEW CXR) LEFT   Final Result      No radiographic evidence of acute bony abnormality      Stable large volumes, cardiac silhouette enlargement and evidence of underlying scarring      Borderline enlarged right hilus. This could be from vascular structures or adenopathy            LABORATORY  Lab Results   Component Value Date    SODIUM 140 02/18/2020    POTASSIUM 3.8 02/18/2020    CHLORIDE 105 02/18/2020    CO2 25 02/18/2020    GLUCOSE 140 (H) 02/18/2020    BUN 26 (H) 02/18/2020    CREATININE 1.14 02/18/2020    CREATININE 0.97 04/14/2011    GLOMRATE >59 11/19/2009        Lab Results   Component Value Date    WBC 8.2 02/18/2020    WBC 7.5 11/19/2009    HEMOGLOBIN 13.8 02/18/2020    HEMATOCRIT 43.5 02/18/2020    PLATELETCT 153 (L) 02/18/2020        Total time of the discharge process exceeds 32 minutes.

## 2020-02-21 NOTE — DISCHARGE PLANNING
Anticipated Discharge Disposition: [facility]    Action: Transportation set for 1400. LSW contacted pt's daughter, Alis, and informed of transfer. Daughter provided verbal consent for CO[facility]RA. LSW informed of right to appeal, daughter provided verbal understanding. No additional questions.     LSW updated bedside RN.    Barriers to Discharge: None    Plan: Complete transfer packet.

## 2020-02-21 NOTE — PROGRESS NOTES
Pt is confused and only oriented to self, cannot stated pain of SOB, transfers to chair with 2x assist, on 2L NC. Lung diminished, heart regular, bowel sound normative. Slept for most of shift

## 2020-02-21 NOTE — DISCHARGE PLANNING
Anticipated Discharge Disposition: [facility]    Action: LSW faxed transport request to [facility].    Barriers to Discharge: None    Plan: Await transport set up.

## 2020-02-21 NOTE — PROGRESS NOTES
Patient is AOx2. Orientent to person and place. Denies pain or discomfort. No SOB or cough noted. Continues to wear 2L/min of O2 via NC. 2L/Min is patient baseline. Denies numbness or tingling. Heavy 1x assist to BSC. Denies NVD. Oral intake is adequate with setup and encouragement. Alarms on for safety. Continues on Tamiflu for influenza A. Will DC to [facility] center today. Report called to nurse. Plan for transport at 1400 today.

## 2020-02-23 LAB
BACTERIA BLD CULT: NORMAL
BACTERIA BLD CULT: NORMAL
SIGNIFICANT IND 70042: NORMAL
SIGNIFICANT IND 70042: NORMAL
SITE SITE: NORMAL
SITE SITE: NORMAL
SOURCE SOURCE: NORMAL
SOURCE SOURCE: NORMAL

## 2020-03-27 ENCOUNTER — PATIENT OUTREACH (OUTPATIENT)
Dept: HEALTH INFORMATION MANAGEMENT | Facility: OTHER | Age: 80
End: 2020-03-27

## 2020-04-24 ENCOUNTER — PATIENT OUTREACH (OUTPATIENT)
Dept: HEALTH INFORMATION MANAGEMENT | Facility: OTHER | Age: 80
End: 2020-04-24

## 2020-04-27 NOTE — PROGRESS NOTES
A 79-year-old female was admitted into [facility] on 2/15/10 and then transferred to [facility] on 2/21/20. The patient was then discharged home on 3/26/20 with [facility]. The patient was admitted due to failure to thrive, dementia, and Influenza A.    The patient was ordered to start/continue to take the following medications: Oseltamivir Phosphate (Tamiflu), Ipratropium/Albuterol Nebu Soln (Duoneb), and Enoxaparin Sodium (Lovenox). The medications were sent to her assisted living facility, [facility], where the medical technician provided her medications to the patient.     The patient was ordered to follow-up with Home Health. The patient declined [facility].     Throughout the majority of the case, out-reaches were sent to voicemail. When patient did pick-up she was not engaged, due to the patient not being able to remember who the Navigator was and her not wanting to share information with someone she did not recognize.

## 2020-07-16 ENCOUNTER — HOSPITAL ENCOUNTER (EMERGENCY)
Facility: MEDICAL CENTER | Age: 80
End: 2020-07-17
Attending: EMERGENCY MEDICINE
Payer: MEDICARE

## 2020-07-16 DIAGNOSIS — R10.32 LLQ ABDOMINAL PAIN: ICD-10-CM

## 2020-07-16 DIAGNOSIS — N20.1 URETERAL STONE: ICD-10-CM

## 2020-07-16 PROCEDURE — 99285 EMERGENCY DEPT VISIT HI MDM: CPT

## 2020-07-16 PROCEDURE — 83605 ASSAY OF LACTIC ACID: CPT

## 2020-07-16 PROCEDURE — 80053 COMPREHEN METABOLIC PANEL: CPT

## 2020-07-16 PROCEDURE — 85025 COMPLETE CBC W/AUTO DIFF WBC: CPT

## 2020-07-16 PROCEDURE — 83690 ASSAY OF LIPASE: CPT

## 2020-07-16 ASSESSMENT — LIFESTYLE VARIABLES: DO YOU DRINK ALCOHOL: NO

## 2020-07-16 ASSESSMENT — FIBROSIS 4 INDEX: FIB4 SCORE: 2.49

## 2020-07-17 ENCOUNTER — APPOINTMENT (OUTPATIENT)
Dept: RADIOLOGY | Facility: MEDICAL CENTER | Age: 80
End: 2020-07-17
Attending: EMERGENCY MEDICINE
Payer: MEDICARE

## 2020-07-17 VITALS
RESPIRATION RATE: 13 BRPM | OXYGEN SATURATION: 99 % | SYSTOLIC BLOOD PRESSURE: 134 MMHG | DIASTOLIC BLOOD PRESSURE: 70 MMHG | BODY MASS INDEX: 20.49 KG/M2 | TEMPERATURE: 98 F | WEIGHT: 120 LBS | HEIGHT: 64 IN | HEART RATE: 65 BPM

## 2020-07-17 LAB
ALBUMIN SERPL BCP-MCNC: 4.3 G/DL (ref 3.2–4.9)
ALBUMIN/GLOB SERPL: 1.4 G/DL
ALP SERPL-CCNC: 86 U/L (ref 30–99)
ALT SERPL-CCNC: 8 U/L (ref 2–50)
ANION GAP SERPL CALC-SCNC: 14 MMOL/L (ref 7–16)
APPEARANCE UR: CLEAR
AST SERPL-CCNC: 18 U/L (ref 12–45)
BACTERIA #/AREA URNS HPF: NEGATIVE /HPF
BASOPHILS # BLD AUTO: 0.8 % (ref 0–1.8)
BASOPHILS # BLD: 0.06 K/UL (ref 0–0.12)
BILIRUB SERPL-MCNC: 0.4 MG/DL (ref 0.1–1.5)
BILIRUB UR QL STRIP.AUTO: NEGATIVE
BUN SERPL-MCNC: 36 MG/DL (ref 8–22)
CALCIUM SERPL-MCNC: 9.6 MG/DL (ref 8.5–10.5)
CHLORIDE SERPL-SCNC: 104 MMOL/L (ref 96–112)
CO2 SERPL-SCNC: 23 MMOL/L (ref 20–33)
COLOR UR: YELLOW
CREAT SERPL-MCNC: 1.08 MG/DL (ref 0.5–1.4)
EOSINOPHIL # BLD AUTO: 0.23 K/UL (ref 0–0.51)
EOSINOPHIL NFR BLD: 2.9 % (ref 0–6.9)
EPI CELLS #/AREA URNS HPF: NEGATIVE /HPF
ERYTHROCYTE [DISTWIDTH] IN BLOOD BY AUTOMATED COUNT: 47.1 FL (ref 35.9–50)
GLOBULIN SER CALC-MCNC: 3.1 G/DL (ref 1.9–3.5)
GLUCOSE SERPL-MCNC: 136 MG/DL (ref 65–99)
GLUCOSE UR STRIP.AUTO-MCNC: NEGATIVE MG/DL
HCT VFR BLD AUTO: 45.7 % (ref 37–47)
HGB BLD-MCNC: 14.4 G/DL (ref 12–16)
HYALINE CASTS #/AREA URNS LPF: ABNORMAL /LPF
IMM GRANULOCYTES # BLD AUTO: 0.02 K/UL (ref 0–0.11)
IMM GRANULOCYTES NFR BLD AUTO: 0.3 % (ref 0–0.9)
KETONES UR STRIP.AUTO-MCNC: NEGATIVE MG/DL
LACTATE BLD-SCNC: 1.1 MMOL/L (ref 0.5–2)
LEUKOCYTE ESTERASE UR QL STRIP.AUTO: NEGATIVE
LIPASE SERPL-CCNC: 26 U/L (ref 11–82)
LYMPHOCYTES # BLD AUTO: 1.69 K/UL (ref 1–4.8)
LYMPHOCYTES NFR BLD: 21.3 % (ref 22–41)
MCH RBC QN AUTO: 29.5 PG (ref 27–33)
MCHC RBC AUTO-ENTMCNC: 31.5 G/DL (ref 33.6–35)
MCV RBC AUTO: 93.6 FL (ref 81.4–97.8)
MICRO URNS: ABNORMAL
MONOCYTES # BLD AUTO: 0.78 K/UL (ref 0–0.85)
MONOCYTES NFR BLD AUTO: 9.8 % (ref 0–13.4)
NEUTROPHILS # BLD AUTO: 5.16 K/UL (ref 2–7.15)
NEUTROPHILS NFR BLD: 64.9 % (ref 44–72)
NITRITE UR QL STRIP.AUTO: NEGATIVE
NRBC # BLD AUTO: 0 K/UL
NRBC BLD-RTO: 0 /100 WBC
PH UR STRIP.AUTO: 5.5 [PH] (ref 5–8)
PLATELET # BLD AUTO: 201 K/UL (ref 164–446)
PMV BLD AUTO: 11.8 FL (ref 9–12.9)
POTASSIUM SERPL-SCNC: 4.3 MMOL/L (ref 3.6–5.5)
PROT SERPL-MCNC: 7.4 G/DL (ref 6–8.2)
PROT UR QL STRIP: NEGATIVE MG/DL
RBC # BLD AUTO: 4.88 M/UL (ref 4.2–5.4)
RBC # URNS HPF: ABNORMAL /HPF
RBC UR QL AUTO: ABNORMAL
SODIUM SERPL-SCNC: 141 MMOL/L (ref 135–145)
SP GR UR STRIP.AUTO: 1.03
UROBILINOGEN UR STRIP.AUTO-MCNC: 0.2 MG/DL
WBC # BLD AUTO: 7.9 K/UL (ref 4.8–10.8)
WBC #/AREA URNS HPF: ABNORMAL /HPF

## 2020-07-17 PROCEDURE — 81001 URINALYSIS AUTO W/SCOPE: CPT

## 2020-07-17 PROCEDURE — 700117 HCHG RX CONTRAST REV CODE 255: Performed by: EMERGENCY MEDICINE

## 2020-07-17 PROCEDURE — 700105 HCHG RX REV CODE 258: Performed by: EMERGENCY MEDICINE

## 2020-07-17 PROCEDURE — 74177 CT ABD & PELVIS W/CONTRAST: CPT

## 2020-07-17 RX ORDER — SODIUM CHLORIDE 9 MG/ML
1000 INJECTION, SOLUTION INTRAVENOUS ONCE
Status: COMPLETED | OUTPATIENT
Start: 2020-07-17 | End: 2020-07-17

## 2020-07-17 RX ORDER — ONDANSETRON 4 MG/1
4 TABLET, ORALLY DISINTEGRATING ORAL EVERY 8 HOURS PRN
Qty: 10 TAB | Refills: 0 | Status: SHIPPED | OUTPATIENT
Start: 2020-07-17

## 2020-07-17 RX ORDER — ONDANSETRON 2 MG/ML
4 INJECTION INTRAMUSCULAR; INTRAVENOUS ONCE
Status: DISCONTINUED | OUTPATIENT
Start: 2020-07-17 | End: 2020-07-17 | Stop reason: HOSPADM

## 2020-07-17 RX ADMIN — IOHEXOL 80 ML: 350 INJECTION, SOLUTION INTRAVENOUS at 01:30

## 2020-07-17 RX ADMIN — SODIUM CHLORIDE 1000 ML: 9 INJECTION, SOLUTION INTRAVENOUS at 01:11

## 2020-07-17 NOTE — ED TRIAGE NOTES
"Chief Complaint   Patient presents with   • Abdominal Pain     BIB EMS to [facility], pt on monitor and in gown, labs drawn and sent. Pt reports 2hr ago she had a sharp pain in her LLQ which has now resolved, slight N/V, AOx4, GCS 15.    Medications given en route: 100 mcg fentanyl    /73   Pulse 68   Temp 36.3 °C (97.4 °F) (Temporal)   Resp 15   Ht 1.626 m (5' 4\")   Wt 54.4 kg (120 lb)   SpO2 92%   BMI 20.60 kg/m²   "

## 2020-07-17 NOTE — ED PROVIDER NOTES
ED Provider Note    Scribed for Judson Ritchie M.D. by Dougie Martinez. 7/17/2020, 12:20 AM.    Primary care provider: Mayo Oswald M.D.  Means of arrival: Ambulance  History obtained from: Patient  History limited by: None    CHIEF COMPLAINT  Chief Complaint   Patient presents with   • Abdominal Pain       HPI  Hilda Blake is a 79 y.o. female who presents to the Emergency Department for evaluation of sharp lower abdominal pain acute onset 3 hours ago. Patient states that the pain radiates diffusely and reports associated nausea. Patient denies vomiting, diarrhea, dysuria, vaginal bleeding, hematuria, or passing gas. Her last bowel movement was 2 days ago, and she says that it Is normal for her bowel movements to be a few days apart. She has a history of cholecystectomy but denies any other abdominal surgeries. Patient reports a history of chronic dizziness.    PPE Note: I personally donned full PPE for all patient encounters during this visit, including being clean-shaven with an N95 respirator mask, gloves, and eye protection. Scribe remained outside the patient's room and did not have any contact with the patient for the duration of patient encounter.      REVIEW OF SYSTEMS  Pertinent positives include left lower quadrant abdominal pain and nausea. Pertinent negatives include vomiting, diarrhea, dysuria, vaginal bleeding, hematuria, or passing gas. All other systems negative.    PAST MEDICAL HISTORY   has a past medical history of Breast cancer (HCC) (2004), Glaucoma - Dr. Lima (4/20/2010), Hemangioma of liver, Hemangioma of liver benign (8/19/2013), HTN (hypertension), benign, Hyperlipidemia, Hypertension, Influenza A (2/18/2020), Osteopenia, Ptosis of eyelid, and Tobacco abuse.    SURGICAL HISTORY   has a past surgical history that includes mastectomy bilateral subq; cholecystectomy; mastectomy bilateral subq; cholecystectomy; and hip hemiarthroplasty (Right, 1/12/2019).    SOCIAL HISTORY  Social  "History     Tobacco Use   • Smoking status: Former Smoker     Packs/day: 0.50     Years: 61.00     Pack years: 30.50     Types: Cigarettes     Start date: 1956     Last attempt to quit: 2018     Years since quittin.0   • Smokeless tobacco: Never Used   • Tobacco comment: cessation recommended   Substance Use Topics   • Alcohol use: Yes     Alcohol/week: 0.5 oz     Types: 1 Shots of liquor per week     Comment: > 1 per week   • Drug use: No      Social History     Substance and Sexual Activity   Drug Use No       FAMILY HISTORY  Family History   Problem Relation Age of Onset   • Autoimmune Disease Son         Sarcoidosis   • Cancer Son 48        colon    • No Known Problems Son    • No Known Problems Son    • No Known Problems Daughter    • Diabetes Neg Hx    • Hypertension Neg Hx    • Heart Disease Neg Hx        CURRENT MEDICATIONS  Current Outpatient Medications   Medication Instructions   • Brimonidine Tartrate-Timolol (COMBIGAN) 0.2-0.5 % Solution 2 Drops, Both Eyes, SEE ADMIN INSTRUCTIONS, 1 DROP IN RIGHT EYE TWICE DAILY<BR>1 DROP IN LEFT EYE EVERY MORNING.   • folic acid (FOLVITE) 1 mg, Oral, EVERY MO, WE , FR   • ipratropium-albuterol (DUONEB) 0.5-2.5 (3) MG/3ML nebulizer solution 3 mL, Nebulization, EVERY 4 HOURS PRN   • latanoprost (XALATAN) 0.005 % Solution 1 Drop, Ophthalmic, EVERY EVENING   • levETIRAcetam (KEPPRA) 250 mg, Oral, EVERY 48 HOURS   • Vitamin D 2,000 Units, Oral, DAILY       ALLERGIES  Allergies   Allergen Reactions   • Tamoxifen Anaphylaxis     bleeding       PHYSICAL EXAM  VITAL SIGNS: /63   Pulse 61   Temp 36.3 °C (97.4 °F) (Temporal)   Resp 15   Ht 1.626 m (5' 4\")   Wt 54.4 kg (120 lb)   SpO2 92%   BMI 20.60 kg/m²     Constitutional: Well developed, Well nourished, mild distress.   HENT: Normocephalic, Atraumatic, Oropharynx moist.   Eyes: Conjunctiva normal, No discharge.   Cardiovascular: Normal heart rate, Normal rhythm, No murmurs, equal pulses.   Pulmonary: " Normal breath sounds, No respiratory distress, No wheezing, No rales, No rhonchi.  Chest: No chest wall tenderness or deformity.   Abdomen: Tenderness to the suprapubic area and left lower quadrant, abdomen is mildly distended.  Back: Left side CVA tenderness.   Musculoskeletal: No major deformities noted, No tenderness.   Skin: Warm, Dry, No erythema, No rash.   Neurologic: Alert & oriented x 3, Normal motor function,  No focal deficits noted.   Psychiatric: Affect normal, Judgment normal, Mood normal.     LABS  Results for orders placed or performed during the hospital encounter of 07/16/20   CBC WITH DIFFERENTIAL   Result Value Ref Range    WBC 7.9 4.8 - 10.8 K/uL    RBC 4.88 4.20 - 5.40 M/uL    Hemoglobin 14.4 12.0 - 16.0 g/dL    Hematocrit 45.7 37.0 - 47.0 %    MCV 93.6 81.4 - 97.8 fL    MCH 29.5 27.0 - 33.0 pg    MCHC 31.5 (L) 33.6 - 35.0 g/dL    RDW 47.1 35.9 - 50.0 fL    Platelet Count 201 164 - 446 K/uL    MPV 11.8 9.0 - 12.9 fL    Neutrophils-Polys 64.90 44.00 - 72.00 %    Lymphocytes 21.30 (L) 22.00 - 41.00 %    Monocytes 9.80 0.00 - 13.40 %    Eosinophils 2.90 0.00 - 6.90 %    Basophils 0.80 0.00 - 1.80 %    Immature Granulocytes 0.30 0.00 - 0.90 %    Nucleated RBC 0.00 /100 WBC    Neutrophils (Absolute) 5.16 2.00 - 7.15 K/uL    Lymphs (Absolute) 1.69 1.00 - 4.80 K/uL    Monos (Absolute) 0.78 0.00 - 0.85 K/uL    Eos (Absolute) 0.23 0.00 - 0.51 K/uL    Baso (Absolute) 0.06 0.00 - 0.12 K/uL    Immature Granulocytes (abs) 0.02 0.00 - 0.11 K/uL    NRBC (Absolute) 0.00 K/uL   COMP METABOLIC PANEL   Result Value Ref Range    Sodium 141 135 - 145 mmol/L    Potassium 4.3 3.6 - 5.5 mmol/L    Chloride 104 96 - 112 mmol/L    Co2 23 20 - 33 mmol/L    Anion Gap 14.0 7.0 - 16.0    Glucose 136 (H) 65 - 99 mg/dL    Bun 36 (H) 8 - 22 mg/dL    Creatinine 1.08 0.50 - 1.40 mg/dL    Calcium 9.6 8.5 - 10.5 mg/dL    AST(SGOT) 18 12 - 45 U/L    ALT(SGPT) 8 2 - 50 U/L    Alkaline Phosphatase 86 30 - 99 U/L    Total Bilirubin 0.4  0.1 - 1.5 mg/dL    Albumin 4.3 3.2 - 4.9 g/dL    Total Protein 7.4 6.0 - 8.2 g/dL    Globulin 3.1 1.9 - 3.5 g/dL    A-G Ratio 1.4 g/dL   LIPASE   Result Value Ref Range    Lipase 26 11 - 82 U/L   URINALYSIS CULTURE, IF INDICATED    Specimen: Urine   Result Value Ref Range    Color Yellow     Character Clear     Specific Gravity 1.032 <1.035    Ph 5.5 5.0 - 8.0    Glucose Negative Negative mg/dL    Ketones Negative Negative mg/dL    Protein Negative Negative mg/dL    Bilirubin Negative Negative    Urobilinogen, Urine 0.2 Negative    Nitrite Negative Negative    Leukocyte Esterase Negative Negative    Occult Blood Large (A) Negative    Micro Urine Req Microscopic    LACTIC ACID   Result Value Ref Range    Lactic Acid 1.1 0.5 - 2.0 mmol/L   ESTIMATED GFR   Result Value Ref Range    GFR If  59 (A) >60 mL/min/1.73 m 2    GFR If Non African American 49 (A) >60 mL/min/1.73 m 2   URINE MICROSCOPIC (W/UA)   Result Value Ref Range    WBC 5-10 (A) /hpf    RBC  (A) /hpf    Bacteria Negative None /hpf    Epithelial Cells Negative /hpf    Hyaline Cast 0-2 /lpf       All labs reviewed by me.    RADIOLOGY  CT-ABDOMEN-PELVIS WITH   Final Result         1.  No acute abnormality.   2.  Bilateral nephrolithiasis with 5 mm left ureterovesicular junction stone resulting in left urinary outflow tract obstructive changes   3.  Mildly complex right renal cyst, stable since prior study. Could be followed up in 1 year for evaluation of stability.   4.  Fat-containing right inguinal hernia        The radiologist's interpretation of all radiological studies have been reviewed by me.    COURSE & MEDICAL DECISION MAKING  Pertinent Labs & Imaging studies reviewed. (See chart for details)    12:20 AM - Patient seen and examined at bedside. Patient will be treated with Zofran 4 mg.  Ordered CT-Abdomen, CBC w diff, CMP, Lipase, UA culture if indicated, and Lactic Acid to evaluate her symptoms. The differential diagnoses include  but are not limited to: SBO, UTI, Diverticulitis, Nephrolithiasis    2:09 AM - Patient was reevaluated at bedside. Patient reports that her pain is entirely gone without treatment with pain medication. Discussed lab and radiology results with the patient as shown above and informed them of the plan for discharge. Return precautions discussed. Patient will be provided with a urine strainer. Advised patient to follow up with urology. Patient verbalizes understanding and agreement to this plan of care.      Medical Decision Making: At this point time appears the patient has a ureteral stone at the UV J.  Patient has not had any significant pain and is not requiring pain medications here.  Actually think she may have passed a stone into her bladder.  At this point time patient does not appear to have a UTI we will go ahead and discharge her home with antinausea medication.  She can use Tylenol and ibuprofen for pain.  We will have her follow-up with urology.    The patient will return for new or worsening symptoms and is stable at the time of discharge.    DISPOSITION:  Patient will be discharged home in stable condition.    FOLLOW UP:  No follow-up provider specified.    OUTPATIENT MEDICATIONS:  New Prescriptions    No medications on file          FINAL IMPRESSION  1. Ureteral stone    2. LLQ abdominal pain          Dougie LANTIGUA (Scribe), am scribing for, and in the presence of, Judson Ritchie M.D.    Electronically signed by: Dougie Martinez (Taryn), 7/17/2020    Judson LANTIGUA M.D. personally performed the services described in this documentation, as scribed by Dougie Martinez in my presence, and it is both accurate and complete. C    The note accurately reflects work and decisions made by me.  Judson Ritchie M.D.  7/17/2020  3:04 AM

## 2020-07-17 NOTE — DISCHARGE PLANNING
Medical Social Work     The RN requested SW assistance with [facility] transportation home. The pt address is [address] [place] [address], [place], [place]. SRIRAM faxed a PCS from to [facility] and set up transport with [facility] for 6800. RN aware of transport time.

## 2020-07-17 NOTE — ED NOTES
"Pt discharged back to assisted living via [facility] transport, provided urine strainer and instructions, AOx4. IV discontinued and gauze placed, pt in possession of belongings. Pt provided discharge education and information pertaining to medications and follow up appointments. Pt received copy of discharge instructions and verbalized understanding.     /70   Pulse 65   Temp 36.7 °C (98 °F) (Temporal)   Resp 13   Ht 1.626 m (5' 4\")   Wt 54.4 kg (120 lb)   SpO2 99%   BMI 20.60 kg/m²   "

## 2020-12-28 ENCOUNTER — APPOINTMENT (OUTPATIENT)
Dept: RADIOLOGY | Facility: MEDICAL CENTER | Age: 80
End: 2020-12-28
Attending: EMERGENCY MEDICINE
Payer: MEDICARE

## 2020-12-28 ENCOUNTER — HOSPITAL ENCOUNTER (EMERGENCY)
Facility: MEDICAL CENTER | Age: 80
End: 2020-12-28
Attending: EMERGENCY MEDICINE
Payer: MEDICARE

## 2020-12-28 VITALS
HEART RATE: 68 BPM | TEMPERATURE: 98.5 F | WEIGHT: 125 LBS | RESPIRATION RATE: 18 BRPM | BODY MASS INDEX: 21.34 KG/M2 | SYSTOLIC BLOOD PRESSURE: 124 MMHG | HEIGHT: 64 IN | DIASTOLIC BLOOD PRESSURE: 73 MMHG | OXYGEN SATURATION: 92 %

## 2020-12-28 DIAGNOSIS — J44.1 ACUTE EXACERBATION OF CHRONIC OBSTRUCTIVE PULMONARY DISEASE (COPD) (HCC): ICD-10-CM

## 2020-12-28 DIAGNOSIS — R06.2 WHEEZING: ICD-10-CM

## 2020-12-28 LAB
ANION GAP SERPL CALC-SCNC: 9 MMOL/L (ref 7–16)
BASOPHILS # BLD AUTO: 0.6 % (ref 0–1.8)
BASOPHILS # BLD: 0.06 K/UL (ref 0–0.12)
BUN SERPL-MCNC: 27 MG/DL (ref 8–22)
CALCIUM SERPL-MCNC: 9.4 MG/DL (ref 8.5–10.5)
CHLORIDE SERPL-SCNC: 102 MMOL/L (ref 96–112)
CO2 SERPL-SCNC: 26 MMOL/L (ref 20–33)
COVID ORDER STATUS COVID19: NORMAL
CREAT SERPL-MCNC: 1.11 MG/DL (ref 0.5–1.4)
EOSINOPHIL # BLD AUTO: 0.13 K/UL (ref 0–0.51)
EOSINOPHIL NFR BLD: 1.3 % (ref 0–6.9)
ERYTHROCYTE [DISTWIDTH] IN BLOOD BY AUTOMATED COUNT: 46.4 FL (ref 35.9–50)
FLUAV RNA SPEC QL NAA+PROBE: NEGATIVE
FLUBV RNA SPEC QL NAA+PROBE: NEGATIVE
GLUCOSE SERPL-MCNC: 250 MG/DL (ref 65–99)
HCT VFR BLD AUTO: 45.6 % (ref 37–47)
HGB BLD-MCNC: 14.8 G/DL (ref 12–16)
IMM GRANULOCYTES # BLD AUTO: 0.03 K/UL (ref 0–0.11)
IMM GRANULOCYTES NFR BLD AUTO: 0.3 % (ref 0–0.9)
LYMPHOCYTES # BLD AUTO: 1 K/UL (ref 1–4.8)
LYMPHOCYTES NFR BLD: 10 % (ref 22–41)
MCH RBC QN AUTO: 30.2 PG (ref 27–33)
MCHC RBC AUTO-ENTMCNC: 32.5 G/DL (ref 33.6–35)
MCV RBC AUTO: 93.1 FL (ref 81.4–97.8)
MONOCYTES # BLD AUTO: 0.97 K/UL (ref 0–0.85)
MONOCYTES NFR BLD AUTO: 9.7 % (ref 0–13.4)
NEUTROPHILS # BLD AUTO: 7.83 K/UL (ref 2–7.15)
NEUTROPHILS NFR BLD: 78.1 % (ref 44–72)
NRBC # BLD AUTO: 0 K/UL
NRBC BLD-RTO: 0 /100 WBC
PLATELET # BLD AUTO: 151 K/UL (ref 164–446)
PMV BLD AUTO: 11.8 FL (ref 9–12.9)
POTASSIUM SERPL-SCNC: 4.1 MMOL/L (ref 3.6–5.5)
RBC # BLD AUTO: 4.9 M/UL (ref 4.2–5.4)
RSV RNA SPEC QL NAA+PROBE: NEGATIVE
SARS-COV-2 RNA RESP QL NAA+PROBE: NOTDETECTED
SODIUM SERPL-SCNC: 137 MMOL/L (ref 135–145)
SPECIMEN SOURCE: NORMAL
WBC # BLD AUTO: 10 K/UL (ref 4.8–10.8)

## 2020-12-28 PROCEDURE — 85025 COMPLETE CBC W/AUTO DIFF WBC: CPT

## 2020-12-28 PROCEDURE — 700111 HCHG RX REV CODE 636 W/ 250 OVERRIDE (IP): Performed by: EMERGENCY MEDICINE

## 2020-12-28 PROCEDURE — 99285 EMERGENCY DEPT VISIT HI MDM: CPT

## 2020-12-28 PROCEDURE — 0241U HCHG SARS-COV-2 COVID-19 NFCT DS RESP RNA 4 TRGT MIC: CPT

## 2020-12-28 PROCEDURE — 700101 HCHG RX REV CODE 250

## 2020-12-28 PROCEDURE — C9803 HOPD COVID-19 SPEC COLLECT: HCPCS | Performed by: EMERGENCY MEDICINE

## 2020-12-28 PROCEDURE — 96374 THER/PROPH/DIAG INJ IV PUSH: CPT

## 2020-12-28 PROCEDURE — 71045 X-RAY EXAM CHEST 1 VIEW: CPT

## 2020-12-28 PROCEDURE — 94640 AIRWAY INHALATION TREATMENT: CPT

## 2020-12-28 PROCEDURE — 80048 BASIC METABOLIC PNL TOTAL CA: CPT

## 2020-12-28 RX ORDER — PREDNISONE 20 MG/1
60 TABLET ORAL DAILY
Qty: 15 TAB | Refills: 0 | Status: SHIPPED | OUTPATIENT
Start: 2020-12-28 | End: 2020-12-28 | Stop reason: SDUPTHER

## 2020-12-28 RX ORDER — IPRATROPIUM BROMIDE AND ALBUTEROL SULFATE 2.5; .5 MG/3ML; MG/3ML
3 SOLUTION RESPIRATORY (INHALATION) 4 TIMES DAILY
Qty: 30 EACH | Refills: 0 | Status: SHIPPED | OUTPATIENT
Start: 2020-12-28 | End: 2020-12-28 | Stop reason: SDUPTHER

## 2020-12-28 RX ORDER — METHYLPREDNISOLONE SODIUM SUCCINATE 125 MG/2ML
125 INJECTION, POWDER, LYOPHILIZED, FOR SOLUTION INTRAMUSCULAR; INTRAVENOUS ONCE
Status: COMPLETED | OUTPATIENT
Start: 2020-12-28 | End: 2020-12-28

## 2020-12-28 RX ORDER — IPRATROPIUM BROMIDE AND ALBUTEROL SULFATE 2.5; .5 MG/3ML; MG/3ML
3 SOLUTION RESPIRATORY (INHALATION) 4 TIMES DAILY
Qty: 30 EACH | Refills: 0 | Status: SHIPPED | OUTPATIENT
Start: 2020-12-28

## 2020-12-28 RX ORDER — DOXYCYCLINE 100 MG/1
100 CAPSULE ORAL 2 TIMES DAILY
Qty: 20 CAP | Refills: 0 | Status: SHIPPED | OUTPATIENT
Start: 2020-12-28 | End: 2021-01-07

## 2020-12-28 RX ORDER — PREDNISONE 20 MG/1
60 TABLET ORAL DAILY
Qty: 15 TAB | Refills: 0 | Status: SHIPPED | OUTPATIENT
Start: 2020-12-28 | End: 2021-01-02

## 2020-12-28 RX ORDER — IPRATROPIUM BROMIDE AND ALBUTEROL SULFATE 2.5; .5 MG/3ML; MG/3ML
SOLUTION RESPIRATORY (INHALATION)
Status: COMPLETED
Start: 2020-12-28 | End: 2020-12-28

## 2020-12-28 RX ORDER — DOXYCYCLINE 100 MG/1
100 CAPSULE ORAL 2 TIMES DAILY
Qty: 20 CAP | Refills: 0 | Status: SHIPPED | OUTPATIENT
Start: 2020-12-28 | End: 2020-12-28 | Stop reason: SDUPTHER

## 2020-12-28 RX ADMIN — METHYLPREDNISOLONE SODIUM SUCCINATE 125 MG: 125 INJECTION, POWDER, FOR SOLUTION INTRAMUSCULAR; INTRAVENOUS at 10:58

## 2020-12-28 RX ADMIN — IPRATROPIUM BROMIDE AND ALBUTEROL SULFATE 3 ML: .5; 3 SOLUTION RESPIRATORY (INHALATION) at 11:03

## 2020-12-28 ASSESSMENT — FIBROSIS 4 INDEX: FIB4 SCORE: 2.53

## 2020-12-28 NOTE — ED NOTES
Contacted the staff at [facility] and they said that pt has had low SPO2 saturation of 88% starting yesterday.  Pt has also had more frequent periods of confusion than normal.  Pt also having increased difficulty ambulating.  Normally she ambulates with a walker but pt has had to have 1 person assist.  ERP aware.

## 2020-12-28 NOTE — ED TRIAGE NOTES
Chief Complaint   Patient presents with   • T-5000 FALL   • Arm Pain     Pt brought in by ems.  Pt is a resident of the [facility].  Per ems, pt was found in her room on the floor.  Pt does not recall falling.  Pt c/o left arm pain but is vague about where pain is and when it started.  Pt aaox2, gcs 14.  Per ems this is pt's baseline.  Connected to cardiac monitor, BP cuff, and continuous pulse ox upon arrival.  Pt in gown, call light in reach, bed in lowest position.  Chart up for ERP.

## 2020-12-28 NOTE — ED PROVIDER NOTES
"ED Provider Note    Scribed for Red Trevizo M.D. by Jose Cai. 12/28/2020  10:27 AM    Primary care provider: Mayo Oswald M.D.  Means of arrival: Ambulance  History obtained from: Patient  History limited by: None    CHIEF COMPLAINT  Chief Complaint   Patient presents with   • T-5000 FALL   • Arm Pain       HPI  Hilda Blake is a 80 y.o. female who presents to the Emergency Department brought in from an assisted living facility for evaluation following an unwitnessed fall. Per facility staff, the patient was found down in her room on the floor this morning. They are unsure how long she was down for and the patient has no recollection of the fall. She does note an ongoing mild \"burning\" pain to her left arm, but states this is intermittent and not related to the fall. The patient otherwise has no complaints of pain and has no infectious symptoms of fevers or chills. No alleviating or aggravating factors are reported.     Upon talking with assisted living facility staff, they express a concern as the patient has had increased episodes of confusion the last 2 days. She additionally is typically able to ambulate with a walker, but has had difficulties with walking. The patient was also found to be hypoxic at 88% yesterday, but upon EMS arrival this morning she was found to be 90%.     REVIEW OF SYSTEMS  Pertinent positives include arm pain and confusion.   Pertinent negatives include no fever or chills.    All other systems reviewed and negative. See HPI for further details.     PAST MEDICAL HISTORY   has a past medical history of Breast cancer (HCC) (2004), Glaucoma - Dr. Lima (4/20/2010), Hemangioma of liver, Hemangioma of liver benign (8/19/2013), HTN (hypertension), benign, Hyperlipidemia, Hypertension, Influenza A (2/18/2020), Osteopenia, Ptosis of eyelid, and Tobacco abuse.    SURGICAL HISTORY   has a past surgical history that includes mastectomy bilateral subq; cholecystectomy; mastectomy " "bilateral subq; cholecystectomy; and hip hemiarthroplasty (Right, 2019).    SOCIAL HISTORY  Social History     Tobacco Use   • Smoking status: Former Smoker     Packs/day: 0.50     Years: 61.00     Pack years: 30.50     Types: Cigarettes     Start date: 1956     Quit date: 2018     Years since quittin.4   • Smokeless tobacco: Never Used   • Tobacco comment: cessation recommended   Substance Use Topics   • Alcohol use: Yes     Alcohol/week: 0.5 oz     Types: 1 Shots of liquor per week     Comment: > 1 per week   • Drug use: No      Social History     Substance and Sexual Activity   Drug Use No       FAMILY HISTORY  Family History   Problem Relation Age of Onset   • Autoimmune Disease Son         Sarcoidosis   • Cancer Son 48        colon    • No Known Problems Son    • No Known Problems Son    • No Known Problems Daughter    • Diabetes Neg Hx    • Hypertension Neg Hx    • Heart Disease Neg Hx        CURRENT MEDICATIONS  Home Medications    **Home medications have not yet been reviewed for this encounter**         ALLERGIES  Allergies   Allergen Reactions   • Tamoxifen Anaphylaxis     bleeding       PHYSICAL EXAM  VITAL SIGNS: /58   Pulse 99   Temp 36.9 °C (98.5 °F) (Temporal)   Resp 18   Ht 1.626 m (5' 4\")   Wt 56.7 kg (125 lb)   SpO2 94%   BMI 21.46 kg/m²     Nursing note and vitals reviewed.  Constitutional: Elderly appearing female. Well-developed and well-nourished. No distress.   HENT: Head is normocephalic and atraumatic. Oropharynx is clear and moist without exudate or erythema.   Eyes: Pupils are equal, round, and reactive to light. Conjunctiva are normal.   Cardiovascular: Normal rate and regular rhythm. No murmur heard. Normal radial pulses.  Pulmonary/Chest: Faint expiratory wheezing. No rales.   Abdominal: Soft and non-tender. No distention    Musculoskeletal: Extremities exhibit normal range of motion without edema or tenderness.   Neurological: Awake, alert and oriented " to person, place, and time. No focal deficits noted.  Skin: Skin is warm and dry. No rash.   Psychiatric: Normal mood and affect. Appropriate for clinical situation.    DIAGNOSTIC STUDIES / PROCEDURES    LABS  Results for orders placed or performed during the hospital encounter of 12/28/20   CBC WITH DIFFERENTIAL   Result Value Ref Range    WBC 10.0 4.8 - 10.8 K/uL    RBC 4.90 4.20 - 5.40 M/uL    Hemoglobin 14.8 12.0 - 16.0 g/dL    Hematocrit 45.6 37.0 - 47.0 %    MCV 93.1 81.4 - 97.8 fL    MCH 30.2 27.0 - 33.0 pg    MCHC 32.5 (L) 33.6 - 35.0 g/dL    RDW 46.4 35.9 - 50.0 fL    Platelet Count 151 (L) 164 - 446 K/uL    MPV 11.8 9.0 - 12.9 fL    Neutrophils-Polys 78.10 (H) 44.00 - 72.00 %    Lymphocytes 10.00 (L) 22.00 - 41.00 %    Monocytes 9.70 0.00 - 13.40 %    Eosinophils 1.30 0.00 - 6.90 %    Basophils 0.60 0.00 - 1.80 %    Immature Granulocytes 0.30 0.00 - 0.90 %    Nucleated RBC 0.00 /100 WBC    Neutrophils (Absolute) 7.83 (H) 2.00 - 7.15 K/uL    Lymphs (Absolute) 1.00 1.00 - 4.80 K/uL    Monos (Absolute) 0.97 (H) 0.00 - 0.85 K/uL    Eos (Absolute) 0.13 0.00 - 0.51 K/uL    Baso (Absolute) 0.06 0.00 - 0.12 K/uL    Immature Granulocytes (abs) 0.03 0.00 - 0.11 K/uL    NRBC (Absolute) 0.00 K/uL   BASIC METABOLIC PANEL   Result Value Ref Range    Sodium 137 135 - 145 mmol/L    Potassium 4.1 3.6 - 5.5 mmol/L    Chloride 102 96 - 112 mmol/L    Co2 26 20 - 33 mmol/L    Glucose 250 (H) 65 - 99 mg/dL    Bun 27 (H) 8 - 22 mg/dL    Creatinine 1.11 0.50 - 1.40 mg/dL    Calcium 9.4 8.5 - 10.5 mg/dL    Anion Gap 9.0 7.0 - 16.0   COVID/SARS CoV-2 PCR    Specimen: Nasopharyngeal; Respirate   Result Value Ref Range    COVID Order Status Received    CoV-2, Flu A/B, And RSV by PCR   Result Value Ref Range    SARS-CoV-2 Source NP Swab    ESTIMATED GFR   Result Value Ref Range    GFR If  57 (A) >60 mL/min/1.73 m 2    GFR If Non  47 (A) >60 mL/min/1.73 m 2       All labs reviewed by  me.    RADIOLOGY  DX-CHEST-PORTABLE (1 VIEW)   Final Result      1.  Mildly prominent position again seen, edema versus interstitial lung disease.   2.  No lobar pneumonia or pneumothorax.        The radiologist's interpretation of all radiological studies have been reviewed by me.    COURSE & MEDICAL DECISION MAKING  Nursing notes, ILANA MCMAHONx reviewed in chart.       10:27 AM - Patient seen and examined at bedside. Patient has a pulsed form, which essentially requests comfort care measures only. At this time she has no complaints and barely has dementia. Will contact care facility to see if they have any other concerns.     10:39 AM - Living facility staff expresses concern of increased episodes of confusion and hypoxia. Will order DX-Chest (1 view), CBC with differential, BMP, and COVID/SARS to evaluate. The patient will be treated with Solu-medrol 125 mg. She will additionally receive Albuterol and Atrovent nebulizer.     11:34 AM in the emergency department the patient has done well.  She is satting normally on room air.  Patient has orders for up to 2 L of oxygen via nasal cannula at her care facility.  On 2 L here the patient is satting 94%.  Chest x-ray does not demonstrate any evidence of lobar pneumonia.  She clearly had wheezing at the time of presentation.  This is improved after nebulizer treatment Solu-Medrol.  Patient will be discharged back to her care facility with doxycycline, DuoNeb, and prednisone.  Discharged in improved condition.    The patient will return for new or worsening symptoms and is stable at the time of discharge.    The patient is referred to a primary physician for blood pressure management, diabetic screening, and for all other preventative health concerns.    DISPOSITION:  Patient will be discharged home in stable condition.    FOLLOW UP:  Mayo Oswald M.D.  [address] [facility] [address]  [phone]    Schedule an appointment as soon as possible for a visit       [facility], Emergency Dept  [address] [facility] [id number] [phone]    If symptoms worsen      OUTPATIENT MEDICATIONS:  Current Discharge Medication List      START taking these medications    Details   doxycycline (MONODOX) 100 MG capsule Take 1 Cap by mouth 2 times a day for 10 days.  Qty: 20 Cap, Refills: 0      !! ipratropium-albuterol (DUONEB) 0.5-2.5 (3) MG/3ML nebulizer solution Take 3 mL by nebulization 4 times a day.  Qty: 30 Each, Refills: 0      predniSONE (DELTASONE) 20 MG Tab Take 3 Tabs by mouth every day for 5 days.  Qty: 15 Tab, Refills: 0       !! - Potential duplicate medications found. Please discuss with provider.            FINAL IMPRESSION  1. Acute exacerbation of chronic obstructive pulmonary disease (COPD) (HCC)    2. Wheezing          IJose (Scribe), am scribing for, and in the presence of, Red Trevizo M.D..    Electronically signed by: Jose Cai (Scribe), 12/28/2020    Red LANTIGUA M.D. personally performed the services described in this documentation, as scribed by Jose Cai in my presence, and it is both accurate and complete.    C.    The note accurately reflects work and decisions made by me.  Red Trevizo M.D.  12/28/2020  11:35 AM

## 2020-12-28 NOTE — ED NOTES
[facility] picked up pt via wheelchair van.  PIV removed.  Given Rx X3.  VSS.  All questions answered.

## 2021-01-11 DIAGNOSIS — Z23 NEED FOR VACCINATION: ICD-10-CM

## 2021-04-26 ENCOUNTER — PATIENT MESSAGE (OUTPATIENT)
Dept: HEALTH INFORMATION MANAGEMENT | Facility: OTHER | Age: 81
End: 2021-04-26

## 2021-06-11 ENCOUNTER — PATIENT OUTREACH (OUTPATIENT)
Dept: HEALTH INFORMATION MANAGEMENT | Facility: OTHER | Age: 81
End: 2021-06-11

## 2021-06-11 NOTE — PROGRESS NOTES
Outcome: Left Message    Comprehensive Health Assessment  Please transfer to Patient Outreach Team at [phone] when patient returns call.    Attempt # 2

## 2021-06-22 NOTE — PROGRESS NOTES
Inbound call from dtr- declined due to other health concerns, she will discuss with Wellness Nurse at [facility])  [facility] and will CB if she decides to schedule.

## 2021-07-09 ENCOUNTER — HOSPITAL ENCOUNTER (OUTPATIENT)
Facility: MEDICAL CENTER | Age: 81
End: 2021-07-09
Attending: INTERNAL MEDICINE
Payer: MEDICARE

## 2021-07-09 LAB
C DIFF DNA SPEC QL NAA+PROBE: NEGATIVE
C DIFF TOX GENS STL QL NAA+PROBE: NEGATIVE

## 2021-07-09 PROCEDURE — 87493 C DIFF AMPLIFIED PROBE: CPT

## 2021-07-31 ENCOUNTER — APPOINTMENT (OUTPATIENT)
Dept: RADIOLOGY | Facility: MEDICAL CENTER | Age: 81
End: 2021-07-31
Attending: EMERGENCY MEDICINE
Payer: MEDICARE

## 2021-07-31 ENCOUNTER — HOSPITAL ENCOUNTER (EMERGENCY)
Facility: MEDICAL CENTER | Age: 81
End: 2021-07-31
Attending: EMERGENCY MEDICINE
Payer: MEDICARE

## 2021-07-31 VITALS
HEIGHT: 64 IN | RESPIRATION RATE: 18 BRPM | HEART RATE: 109 BPM | WEIGHT: 110 LBS | OXYGEN SATURATION: 94 % | DIASTOLIC BLOOD PRESSURE: 86 MMHG | BODY MASS INDEX: 18.78 KG/M2 | TEMPERATURE: 97.9 F | SYSTOLIC BLOOD PRESSURE: 162 MMHG
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
## 2021-07-31 DIAGNOSIS — R09.02 HYPOXIA: ICD-10-CM

## 2021-07-31 DIAGNOSIS — F03.90 DEMENTIA WITHOUT BEHAVIORAL DISTURBANCE, UNSPECIFIED DEMENTIA TYPE: ICD-10-CM

## 2021-07-31 DIAGNOSIS — Z51.5 COMFORT MEASURES ONLY STATUS: ICD-10-CM

## 2021-07-31 DIAGNOSIS — M25.552 LEFT HIP PAIN: ICD-10-CM

## 2021-07-31 PROCEDURE — 99285 EMERGENCY DEPT VISIT HI MDM: CPT

## 2021-07-31 PROCEDURE — 73502 X-RAY EXAM HIP UNI 2-3 VIEWS: CPT | Mod: LT

## 2021-07-31 ASSESSMENT — FIBROSIS 4 INDEX: FIB4 SCORE: 3.41

## 2021-07-31 NOTE — ED NOTES
JOSEFINA Moss to bedside to assess pt as she is requiring oxygen and cannot be sent back to [facility] as she does not have oxygen provider set up.  JOSEFINA Moss discussed case with pt's daughter who states she prefers to not have changes worked up and is comfortable with a referral to hospice.  Alerted SW, awaiting to see if any hospice company available for admit today.

## 2021-07-31 NOTE — DISCHARGE PLANNING
Received Choice form at [phone]  Agency/Facility Name: [facility]  Referral sent per Choice form @ [phone]

## 2021-07-31 NOTE — DISCHARGE PLANNING
MSW received call from bedside RN. Pt needs ride back to the [facility]. Pt's daughter is out of town and pt does not have a wheelchair at bedside. MSW called and arranged GMT back to [place]. Cost $133.90 Billed to [facility]. GMT set transport for 1430. MSW updated bedside RN of time of transport.

## 2021-07-31 NOTE — ED NOTES
Spoke with Hosea at [facility] who states pt does not have oxygen set up.

## 2021-07-31 NOTE — DISCHARGE PLANNING
MSW spoke to Nj with [facility]. He will be there at [facility] shortly to evaluate pt.

## 2021-07-31 NOTE — ED NOTES
Left 2 VMs for [facility] to alert them that pt is being discharged and will require oxygen now.

## 2021-07-31 NOTE — ED TRIAGE NOTES
Chief Complaint   Patient presents with   • GLF     PT found on L side in WC having tipped WC.  C/O L hip pain, unable to straighten either leg.     Pt is dementia at baseline per FD report.  She is oriented to person only for RN, unable to straighten either leg for RN or ERP.  HAs problems with word finding as well.

## 2021-07-31 NOTE — ED PROVIDER NOTES
ED Provider Note    Patient's nurse made me aware that patient was tachycardic and mildly hypoxic.  Patient does have a POLST with no associated orders and comfort care measures only.  I did discuss this with patient's daughter, I discussed that we could check x-rays, imaging otherwise and further labs to work this up.  Patient's daughter is very reasonable, she would like us to defer this at this point in order to follow patient's wishes.  Patient therefore will be placed on hospice, but help facilitate oxygen requirements for her comfort.  Social work is very graciously taken the burden of this workload and set patient up with hospice.

## 2021-07-31 NOTE — ED PROVIDER NOTES
ED Provider Note    Scribed for Red Trevizo M.D. by Walter Mcginnis. 7/31/2021  9:22 AM    Primary care provider: Denver J Miller, M.D.  Means of arrival: EMS  History obtained from: Nurse  History limited by: Prachi    CHIEF COMPLAINT  Chief Complaint   Patient presents with    GLF     PT found on L side in WC having tipped WC.  C/O L hip pain, unable to straighten either leg.       HPI  Hilda Blake is a 81 y.o. female who presents to the Emergency Department via EMS for a ground level fall onset prior to arrival. Per nurse, the patient fell off her wheelchair onto her left side. Patient has associated symptoms of left hip pain. The nurse notes that she is unable to ambulate her left leg without feeling pain. No alleviating or exacerbating factors reported.    HPI limited by: Prachi    REVIEW OF SYSTEMS  Pertinent positives include ground level fall and left hip pain.   See HPI for further details.     ROS limited by: Prachi    PAST MEDICAL HISTORY   has a past medical history of Breast cancer (HCC) (2004), Glaucoma - Dr. Lima (4/20/2010), Hemangioma of liver, Hemangioma of liver benign (8/19/2013), HTN (hypertension), benign, Hyperlipidemia, Hypertension, Influenza A (2/18/2020), Osteopenia, Ptosis of eyelid, and Tobacco abuse.    SURGICAL HISTORY   has a past surgical history that includes mastectomy bilateral subq; cholecystectomy; mastectomy bilateral subq; cholecystectomy; and hip hemiarthroplasty (Right, 1/12/2019).    SOCIAL HISTORY  Social History     Tobacco Use    Smoking status: Former Smoker     Packs/day: 0.50     Years: 61.00     Pack years: 30.50     Types: Cigarettes     Start date: 4/27/1956     Quit date: 7/11/2018     Years since quitting: 3.0    Smokeless tobacco: Never Used    Tobacco comment: cessation recommended   Substance Use Topics    Alcohol use: Yes     Alcohol/week: 0.5 oz     Types: 1 Shots of liquor per week     Comment: > 1 per week    Drug use: No      Social History  "    Substance and Sexual Activity   Drug Use No       FAMILY HISTORY  Family History   Problem Relation Age of Onset    Autoimmune Disease Son         Sarcoidosis    Cancer Son 48        colon     No Known Problems Son     No Known Problems Son     No Known Problems Daughter     Diabetes Neg Hx     Hypertension Neg Hx     Heart Disease Neg Hx        CURRENT MEDICATIONS  Current Outpatient Medications   Medication Instructions    Brimonidine Tartrate-Timolol (COMBIGAN) 0.2-0.5 % Solution 2 Drops, Both Eyes, SEE ADMIN INSTRUCTIONS, 1 DROP IN RIGHT EYE TWICE DAILY<BR>1 DROP IN LEFT EYE EVERY MORNING.    folic acid (FOLVITE) 1 mg, Oral, EVERY MO, WE , FR    ipratropium-albuterol (DUONEB) 0.5-2.5 (3) MG/3ML nebulizer solution 3 mL, Nebulization, EVERY 4 HOURS PRN    ipratropium-albuterol (DUONEB) 0.5-2.5 (3) MG/3ML nebulizer solution 3 mL, Nebulization, 4 TIMES DAILY    latanoprost (XALATAN) 0.005 % Solution 1 Drop, Ophthalmic, EVERY EVENING    levETIRAcetam (KEPPRA) 250 mg, Oral, EVERY 48 HOURS    ondansetron (ZOFRAN ODT) 4 mg, Oral, EVERY 8 HOURS PRN    Vitamin D 2,000 Units, Oral, DAILY       ALLERGIES  Allergies   Allergen Reactions    Tamoxifen Anaphylaxis     bleeding       PHYSICAL EXAM  VITAL SIGNS: /82   Pulse 79   Temp (!) 35.7 °C (96.2 °F) (Temporal)   Resp 16   Ht 1.626 m (5' 4\")   Wt 49.9 kg (110 lb)   SpO2 94%   BMI 18.88 kg/m²     Nursing note and vitals reviewed.  Constitutional: Elderly. Chronically ill appearing. No distress.   HENT: Head is normocephalic and atraumatic. Oropharynx is clear and moist without exudate or erythema.   Eyes: Pupils are equal, round, and reactive to light. Conjunctiva are normal.   Cardiovascular: Normal rate and regular rhythm. No murmur heard. Normal radial pulses.  Pulmonary/Chest: Breath sounds normal. No wheezes or rales.   Abdominal: Soft and non-tender. No distention    Musculoskeletal: Pain to palpation and movement of the left hip.  Neurological: " Dimensia. A&O x1.  Skin: Skin is warm and dry. No rash.   Psychiatric: Normal mood and affect. Appropriate for clinical situation.    DIAGNOSTIC STUDIES / PROCEDURES    RADIOLOGY  DX-HIP-COMPLETE - UNILATERAL 2+ LEFT   Final Result      1.  No radiographic evidence of acute traumatic injury left hip.      2.  Possible old fracture medial aspect of the right ischial ramus.        The radiologist's interpretation of all radiological studies have been reviewed by me.    COURSE & MEDICAL DECISION MAKING  Nursing notes, VS, PMSFHx reviewed in chart.     9:22 AM - Patient seen and examined at bedside. Ordered DX-Hip-Unilateral to evaluate her symptoms.      POLST form reviewed which demonstrates the patient is comfort care measures only.  X-ray does not demonstrate any evidence of fracture or dislocation.  I feel that without overt evidence of fracture at this time it is best to treat the patient as per her wishes and comfort care only.  She will be transferred back to her care facility.    The patient will return for new or worsening symptoms and is stable at the time of discharge.    The patient is referred to a primary physician for blood pressure management, diabetic screening, and for all other preventative health concerns.    DISPOSITION:  Patient will be discharged home in stable condition.    FOLLOW UP:  Denver J Miller, M.D.  [address] [id number] [phone]    Schedule an appointment as soon as possible for a visit       [facility], Emergency Dept  [address] [id number] [phone]    If symptoms worsen      OUTPATIENT MEDICATIONS:  New Prescriptions    No medications on file         FINAL IMPRESSION  1. Fall, initial encounter    2. Left hip pain    3. Comfort measures only status          I, Walter Mcginnis (Taryn), am scribing for, and in the presence of, Red Trevizo M.D..    Electronically signed by: Walter Mcginnis (Scribe), 7/31/2021    I,  Red Trevizo M.D. personally performed the services described in this documentation, as scribed by Walter Mcginnis in my presence, and it is both accurate and complete.    The note accurately reflects work and decisions made by me.  Red Trevizo M.D.  7/31/2021  11:59 AM

## 2021-08-01 NOTE — ED NOTES
Pt received oxygen for use at [facility] to be transported with her to her home.  D/C paperwork provided to pt.

## 2021-08-01 NOTE — DISCHARGE PLANNING
MSW met with Nj with [facility]. They can accept pt tonight. MSW re-set up GMT for 1730 transport. Pt O2 delivered to bedside. MSW updated  Lillian at [facility] and Nj at [facility] of transport. MSW also called and updated pt's daughter Alis of plan. Bedside RN updated. ERP updated.

## 2022-01-17 ENCOUNTER — PATIENT MESSAGE (OUTPATIENT)
Dept: HEALTH INFORMATION MANAGEMENT | Facility: OTHER | Age: 82
End: 2022-01-17
Payer: MEDICARE

## 2022-05-05 ENCOUNTER — TELEPHONE (OUTPATIENT)
Dept: HEALTH INFORMATION MANAGEMENT | Facility: OTHER | Age: 82
End: 2022-05-05

## 2022-06-25 NOTE — ASSESSMENT & PLAN NOTE
PT OT evaluation  We will check vitamin D, B12, TSH   Pt off unit in stable condition in wheelchair with volunteers for discharge home per Dr. Denise Frederick . Pt aware of follow up in  1 week. Rx sent electronically. Denies any HA, N/V, pain. Dizziness at this time. MOB to board with baby in room.

## 2022-09-02 ENCOUNTER — DOCUMENTATION (OUTPATIENT)
Dept: HEALTH INFORMATION MANAGEMENT | Facility: OTHER | Age: 82
End: 2022-09-02
Payer: MEDICARE

## 2022-10-27 ENCOUNTER — HOSPITAL ENCOUNTER (OUTPATIENT)
Facility: MEDICAL CENTER | Age: 82
End: 2022-10-27
Attending: INTERNAL MEDICINE
Payer: MEDICARE

## 2022-10-27 LAB
APPEARANCE UR: CLEAR
BILIRUB UR QL STRIP.AUTO: NEGATIVE
COLOR UR: YELLOW
GLUCOSE UR STRIP.AUTO-MCNC: NEGATIVE MG/DL
KETONES UR STRIP.AUTO-MCNC: NEGATIVE MG/DL
LEUKOCYTE ESTERASE UR QL STRIP.AUTO: NEGATIVE
MICRO URNS: NORMAL
NITRITE UR QL STRIP.AUTO: NEGATIVE
PH UR STRIP.AUTO: 7.5 [PH] (ref 5–8)
PROT UR QL STRIP: NEGATIVE MG/DL
RBC UR QL AUTO: NEGATIVE
SP GR UR STRIP.AUTO: 1.02
UROBILINOGEN UR STRIP.AUTO-MCNC: 1 MG/DL

## 2022-10-27 PROCEDURE — 81003 URINALYSIS AUTO W/O SCOPE: CPT

## 2022-11-07 ENCOUNTER — PATIENT MESSAGE (OUTPATIENT)
Dept: HEALTH INFORMATION MANAGEMENT | Facility: OTHER | Age: 82
End: 2022-11-07

## 2023-08-24 ENCOUNTER — TELEPHONE (OUTPATIENT)
Dept: HEALTH INFORMATION MANAGEMENT | Facility: OTHER | Age: 83
End: 2023-08-24

## 2023-09-14 NOTE — CARE PLAN
Problem: Safety  Goal: Will remain free from falls    Intervention: Implement fall precautions   02/10/19 2201   OTHER   Environmental Precautions Treaded Slipper Socks on Patient;Personal Belongings, Wastebasket, Call Bell etc. in Easy Reach;Transferred to Stronger Side;Report Given to Other Health Care Providers Regarding Fall Risk;Bed in Low Position;Communication Sign for Patients & Families   Chair/Bed Strip Alarm Yes - Alarm On         Problem: Discharge Barriers/Planning  Goal: Patient's continuum of care needs will be met    Intervention: Collaborate with Transitional Care Team and Interdisciplinary Team to meet discharge needs  Possible discharge to facility when medically clear.         Bi-Rhombic Flap Text: The defect edges were debeveled with a #15 scalpel blade.  Given the location of the defect and the proximity to free margins a bi-rhombic flap was deemed most appropriate.  Using a sterile surgical marker, an appropriate rhombic flap was drawn incorporating the defect. The area thus outlined was incised deep to adipose tissue with a #15 scalpel blade.  The skin margins were undermined to an appropriate distance in all directions utilizing iris scissors.

## (undated) DEVICE — GLOVE BIOGEL PI ORTHO SZ 8.5 PF LF (40/BX)

## (undated) DEVICE — BLADE SAGITTAL SAW DUAL CUT 75.0 X 25.0MM (1/EA)

## (undated) DEVICE — NEEDLE SAFETY 18 GA X 1 1/2 IN (100EA/BX)

## (undated) DEVICE — NEPTUNE 4 PORT MANIFOLD - (20/PK)

## (undated) DEVICE — SODIUM CHL IRRIGATION 0.9% 1000ML (12EA/CA)

## (undated) DEVICE — MASK ANESTHESIA ADULT  - (100/CA)

## (undated) DEVICE — HEAD HOLDER JUNIOR/ADULT

## (undated) DEVICE — HANDPIECE 10FT INTPLS SCT PLS IRRIGATION HAND CONTROL SET (6/PK)

## (undated) DEVICE — SYRINGE SAFETY 10 ML 18 GA X 1 1/2 BLUNT LL (100/BX 4BX/CA)

## (undated) DEVICE — SUTURE GENERAL

## (undated) DEVICE — ELECTRODE 850 FOAM ADHESIVE - HYDROGEL RADIOTRNSPRNT (50/PK)

## (undated) DEVICE — SODIUM CHL. IRRIGATION 0.9% 3000ML (4EA/CA 65CA/PF)

## (undated) DEVICE — CANISTER SUCTION 3000ML MECHANICAL FILTER AUTO SHUTOFF MEDI-VAC NONSTERILE LF DISP  (40EA/CA)

## (undated) DEVICE — SUTURE 5 TI-CRON HOS-14 - (36/BX)

## (undated) DEVICE — DRESSING POST OP BORDER 4 X 10 (5EA/BX)

## (undated) DEVICE — CONNECTOR Y TBG CRTY 5 IN 1 STERILE (50EA/CA)

## (undated) DEVICE — KIT ANESTHESIA W/CIRCUIT & 3/LT BAG W/FILTER (20EA/CA)

## (undated) DEVICE — CHLORAPREP 26 ML APPLICATOR - ORANGE TINT(25/CA)

## (undated) DEVICE — TIP INTPLS HFLO ML ORFC BTRY - (12/CS)  FOR SURGILAV

## (undated) DEVICE — SET LEADWIRE 5 LEAD BEDSIDE DISPOSABLE ECG (1SET OF 5/EA)

## (undated) DEVICE — TUBING CLEARLINK DUO-VENT - C-FLO (48EA/CA)

## (undated) DEVICE — PACK TOTAL HIP - (1/CA)

## (undated) DEVICE — SUTURE 2-0 VICRYL PLUS CT-1 - 8 X 18 INCH(12/BX)

## (undated) DEVICE — GLOVE BIOGEL SZ 8 SURGICAL PF LTX - (50PR/BX 4BX/CA)

## (undated) DEVICE — SUTURE 1 VICRYL PLUS CTX - 8 X 18 INCH (12/BX)

## (undated) DEVICE — KIT ROOM DECONTAMINATION

## (undated) DEVICE — GLOVE BIOGEL PI INDICATOR SZ 8.0 SURGICAL PF LF -(50/BX 4BX/CA)

## (undated) DEVICE — LACTATED RINGERS INJ 1000 ML - (14EA/CA 60CA/PF)

## (undated) DEVICE — KIT EVACUATER 3 SPRING PVC LF 1/8 DRAIN SIZE (10EA/CA)"

## (undated) DEVICE — GLOVE SZ 7.5 BIOGEL PI MICRO - PF LF (50PR/BX)

## (undated) DEVICE — BLANKET WARMING UPPER BODY - (10/CA)

## (undated) DEVICE — ELECTRODE DUAL RETURN W/ CORD - (50/PK)

## (undated) DEVICE — TUBE E-T HI-LO CUFF 7.0MM (10EA/PK)

## (undated) DEVICE — GLOVE BIOGEL ECLIPSE PF LATEX SIZE 8.5 (50PR/BX)

## (undated) DEVICE — SENSOR SPO2 NEO LNCS ADHESIVE (20/BX) SEE USER NOTES

## (undated) DEVICE — PROTECTOR ULNA NERVE - (36PR/CA)

## (undated) DEVICE — SET EXTENSION WITH 2 PORTS (48EA/CA) ***PART #2C8610 IS A SUBSTITUTE*****

## (undated) DEVICE — LENS/HOOD FOR SPACESUIT - (32/PK) PEEL AWAY FACE

## (undated) DEVICE — SYRINGE SAFETY 3 ML 18 GA X 1 1/2 BLUNT LL (100/BX 8BX/CA)

## (undated) DEVICE — GLOVE BIOGEL PI ORTHO SZ 7.5 PF LF (40PR/BX)

## (undated) DEVICE — SUCTION INSTRUMENT YANKAUER BULBOUS TIP W/O VENT (50EA/CA)

## (undated) DEVICE — GLOVE BIOGEL INDICATOR SZ 8 SURGICAL PF LTX - (50/BX 4BX/CA)